# Patient Record
Sex: MALE | Race: WHITE | NOT HISPANIC OR LATINO | Employment: FULL TIME | URBAN - NONMETROPOLITAN AREA
[De-identification: names, ages, dates, MRNs, and addresses within clinical notes are randomized per-mention and may not be internally consistent; named-entity substitution may affect disease eponyms.]

---

## 2017-02-21 ENCOUNTER — HOSPITAL ENCOUNTER (OUTPATIENT)
Dept: LAB | Facility: MEDICAL CENTER | Age: 57
End: 2017-02-21
Attending: FAMILY MEDICINE
Payer: COMMERCIAL

## 2017-02-21 LAB
ALBUMIN SERPL BCP-MCNC: 4.2 G/DL (ref 3.2–4.9)
ALBUMIN/GLOB SERPL: 1.2 G/DL
ALP SERPL-CCNC: 44 U/L (ref 30–99)
ALT SERPL-CCNC: 20 U/L (ref 2–50)
ANION GAP SERPL CALC-SCNC: 5 MMOL/L (ref 0–11.9)
AST SERPL-CCNC: 19 U/L (ref 12–45)
BILIRUB SERPL-MCNC: 0.8 MG/DL (ref 0.1–1.5)
BUN SERPL-MCNC: 18 MG/DL (ref 8–22)
CALCIUM SERPL-MCNC: 9.7 MG/DL (ref 8.5–10.5)
CHLORIDE SERPL-SCNC: 100 MMOL/L (ref 96–112)
CHOLEST SERPL-MCNC: 169 MG/DL (ref 100–199)
CO2 SERPL-SCNC: 29 MMOL/L (ref 20–33)
CREAT SERPL-MCNC: 1.23 MG/DL (ref 0.5–1.4)
ERYTHROCYTE [DISTWIDTH] IN BLOOD BY AUTOMATED COUNT: 44.5 FL (ref 35.9–50)
GLOBULIN SER CALC-MCNC: 3.4 G/DL (ref 1.9–3.5)
GLUCOSE SERPL-MCNC: 102 MG/DL (ref 65–99)
HCT VFR BLD AUTO: 54.3 % (ref 42–52)
HDLC SERPL-MCNC: 41 MG/DL
HGB BLD-MCNC: 17.6 G/DL (ref 14–18)
LDLC SERPL CALC-MCNC: 111 MG/DL
MCH RBC QN AUTO: 27.6 PG (ref 27–33)
MCHC RBC AUTO-ENTMCNC: 32.4 G/DL (ref 33.7–35.3)
MCV RBC AUTO: 85.1 FL (ref 81.4–97.8)
PLATELET # BLD AUTO: 201 K/UL (ref 164–446)
PMV BLD AUTO: 12.2 FL (ref 9–12.9)
POTASSIUM SERPL-SCNC: 3.8 MMOL/L (ref 3.6–5.5)
PROT SERPL-MCNC: 7.6 G/DL (ref 6–8.2)
PSA SERPL DL<=0.01 NG/ML-MCNC: 1.3 NG/ML (ref 0–4)
RBC # BLD AUTO: 6.38 M/UL (ref 4.7–6.1)
SODIUM SERPL-SCNC: 134 MMOL/L (ref 135–145)
TESTOST SERPL-MCNC: 825 NG/DL (ref 175–781)
TRIGL SERPL-MCNC: 87 MG/DL (ref 0–149)
WBC # BLD AUTO: 8.1 K/UL (ref 4.8–10.8)

## 2017-02-21 PROCEDURE — 84153 ASSAY OF PSA TOTAL: CPT

## 2017-02-21 PROCEDURE — 83036 HEMOGLOBIN GLYCOSYLATED A1C: CPT

## 2017-02-21 PROCEDURE — 36415 COLL VENOUS BLD VENIPUNCTURE: CPT

## 2017-02-21 PROCEDURE — 80053 COMPREHEN METABOLIC PANEL: CPT

## 2017-02-21 PROCEDURE — 84403 ASSAY OF TOTAL TESTOSTERONE: CPT

## 2017-02-21 PROCEDURE — 85027 COMPLETE CBC AUTOMATED: CPT

## 2017-02-21 PROCEDURE — 80061 LIPID PANEL: CPT

## 2017-02-22 LAB
EST. AVERAGE GLUCOSE BLD GHB EST-MCNC: 120 MG/DL
HBA1C MFR BLD: 5.8 % (ref 0–5.6)

## 2017-09-18 ENCOUNTER — APPOINTMENT (OUTPATIENT)
Dept: RADIOLOGY | Facility: IMAGING CENTER | Age: 57
End: 2017-09-18
Attending: CHIROPRACTOR
Payer: COMMERCIAL

## 2017-09-18 ENCOUNTER — NON-PROVIDER VISIT (OUTPATIENT)
Dept: URGENT CARE | Facility: PHYSICIAN GROUP | Age: 57
End: 2017-09-18
Payer: COMMERCIAL

## 2017-09-18 DIAGNOSIS — M54.5 LOW BACK PAIN, UNSPECIFIED BACK PAIN LATERALITY, UNSPECIFIED CHRONICITY, WITH SCIATICA PRESENCE UNSPECIFIED: ICD-10-CM

## 2017-09-18 PROCEDURE — 72100 X-RAY EXAM L-S SPINE 2/3 VWS: CPT | Mod: TC | Performed by: PHYSICIAN ASSISTANT

## 2017-09-18 PROCEDURE — 72070 X-RAY EXAM THORAC SPINE 2VWS: CPT | Mod: TC | Performed by: PHYSICIAN ASSISTANT

## 2017-10-04 ENCOUNTER — OFFICE VISIT (OUTPATIENT)
Dept: URGENT CARE | Facility: PHYSICIAN GROUP | Age: 57
End: 2017-10-04
Payer: COMMERCIAL

## 2017-10-04 VITALS
RESPIRATION RATE: 18 BRPM | HEART RATE: 84 BPM | HEIGHT: 76 IN | OXYGEN SATURATION: 95 % | BODY MASS INDEX: 34.46 KG/M2 | TEMPERATURE: 97.2 F | SYSTOLIC BLOOD PRESSURE: 122 MMHG | WEIGHT: 283 LBS | DIASTOLIC BLOOD PRESSURE: 84 MMHG

## 2017-10-04 DIAGNOSIS — E66.9 OBESITY (BMI 35.0-39.9 WITHOUT COMORBIDITY): ICD-10-CM

## 2017-10-04 PROCEDURE — 99203 OFFICE O/P NEW LOW 30 MIN: CPT | Performed by: FAMILY MEDICINE

## 2017-10-04 RX ORDER — LISINOPRIL AND HYDROCHLOROTHIAZIDE 20; 12.5 MG/1; MG/1
1 TABLET ORAL DAILY
Status: ON HOLD | COMMUNITY
Start: 2017-07-09 | End: 2018-11-12

## 2017-10-04 RX ORDER — TESTOSTERONE CYPIONATE 200 MG/ML
200 INJECTION, SOLUTION INTRAMUSCULAR
COMMUNITY
Start: 2017-08-11 | End: 2018-12-26

## 2017-10-04 ASSESSMENT — ENCOUNTER SYMPTOMS
MYALGIAS: 0
NAUSEA: 0
WHEEZING: 0
DIARRHEA: 0
FEVER: 0
VOMITING: 0
ABDOMINAL PAIN: 0
SPUTUM PRODUCTION: 1
CHILLS: 0
SORE THROAT: 1
COUGH: 1

## 2017-10-04 NOTE — PROGRESS NOTES
"Subjective:      Sumeet Buenrostro is a 57 y.o. male who presents with Pharyngitis            Subjective:     Sumeet Buenrostro is a 57 y.o. male who presents for evaluation of a sore throat. Associated symptoms include sinus and nasal congestion, sore throat, nasal blockage, post nasal drip, chills, productive cough, cough described as dry and productive and clear and yellow nasal discharge. Onset of symptoms was 1 days ago, gradually worsening since that time.  He is drinking plenty of fluids.. He has had recent close exposure to someone with proven streptococcal pharyngitis.            Review of Systems   Constitutional: Positive for malaise/fatigue. Negative for chills and fever.   HENT: Positive for congestion and sore throat. Negative for ear discharge.    Respiratory: Positive for cough and sputum production. Negative for wheezing.    Gastrointestinal: Negative for abdominal pain, diarrhea, nausea and vomiting.   Genitourinary: Negative for dysuria.   Musculoskeletal: Negative for myalgias.   Skin: Negative for itching and rash.          PMH:  has no past medical history on file.  MEDS:   Current Outpatient Prescriptions:   •  lisinopril-hydrochlorothiazide (PRINZIDE, ZESTORETIC) 20-12.5 MG per tablet, , Disp: , Rfl:   •  testosterone cypionate (DEPO-TESTOSTERONE) 200 MG/ML Solution injection, , Disp: , Rfl:   •  TESTIM 1 % TD GEL, Apply 5 g to skin as directed every day., Disp: , Rfl:   ALLERGIES: No Known Allergies  SURGHX:   Past Surgical History:   Procedure Laterality Date   • OTHER ORTHOPEDIC SURGERY  '75    norbert knee     SOCHX:  reports that he has never smoked. He has never used smokeless tobacco. He reports that he does not drink alcohol or use drugs.  FH: Family history was reviewed, no pertinent findings to report       Objective:     /84   Pulse 84   Temp 36.2 °C (97.2 °F)   Resp 18   Ht 1.93 m (6' 4\")   Wt (!) 128.4 kg (283 lb)   SpO2 95%   BMI 34.45 kg/m²      Physical Exam "   Constitutional: He is oriented to person, place, and time. He appears well-developed.   HENT:   Head: Normocephalic.   Right Ear: External ear normal.   Left Ear: External ear normal.   Eyes: EOM are normal. Pupils are equal, round, and reactive to light. Right eye exhibits no discharge. Left eye exhibits no discharge.   Cardiovascular: Normal rate and regular rhythm.    Pulmonary/Chest: Effort normal and breath sounds normal.   Abdominal: Soft. Bowel sounds are normal.   Lymphadenopathy:     He has no cervical adenopathy.   Neurological: He is alert and oriented to person, place, and time.   Skin: Skin is warm and dry.               Assessment/Plan:     Laboratory   RAPIDSTREP: negative      Assessment:    Viral pharyngitis.     Plan:    Use of OTC analgesics recommended as well as salt water gargles, Use of decongestant recommended, Pt advised of the risk of peritonsillar abscess formation, Pt advised that he will be infectious for 24hrs after starting ABX    Obesity  - recommended healthy diet with portion control , aerobic exercise 5x week  - recommended nutritionist referral however pt deffered at the time  - obesity counseling done today   - pt stated snoring and non refreshing sleep. Pt was recommended to discuss sleep related breathing disorder with the PCP since MARGIE is an independent risk factor for stroke and heart disease.

## 2017-10-04 NOTE — LETTER
October 4, 2017         Patient: Sumeet Buenrostro   YOB: 1960   Date of Visit: 10/4/2017           To Whom it May Concern:    Sumeet Buenrostro was seen in my clinic on 10/4/2017. He may return to work on 10/6/17..    If you have any questions or concerns, please don't hesitate to call.        Sincerely,           Annmarie Beckett M.D.  Electronically Signed

## 2017-10-04 NOTE — PATIENT INSTRUCTIONS
Pharyngitis  Pharyngitis is redness, pain, and swelling (inflammation) of your pharynx.   CAUSES   Pharyngitis is usually caused by infection. Most of the time, these infections are from viruses (viral) and are part of a cold. However, sometimes pharyngitis is caused by bacteria (bacterial). Pharyngitis can also be caused by allergies. Viral pharyngitis may be spread from person to person by coughing, sneezing, and personal items or utensils (cups, forks, spoons, toothbrushes). Bacterial pharyngitis may be spread from person to person by more intimate contact, such as kissing.   SIGNS AND SYMPTOMS   Symptoms of pharyngitis include:    · Sore throat.    · Tiredness (fatigue).    · Low-grade fever.    · Headache.  · Joint pain and muscle aches.  · Skin rashes.  · Swollen lymph nodes.  · Plaque-like film on throat or tonsils (often seen with bacterial pharyngitis).  DIAGNOSIS   Your health care provider will ask you questions about your illness and your symptoms. Your medical history, along with a physical exam, is often all that is needed to diagnose pharyngitis. Sometimes, a rapid strep test is done. Other lab tests may also be done, depending on the suspected cause.   TREATMENT   Viral pharyngitis will usually get better in 3-4 days without the use of medicine. Bacterial pharyngitis is treated with medicines that kill germs (antibiotics).   HOME CARE INSTRUCTIONS   · Drink enough water and fluids to keep your urine clear or pale yellow.    · Only take over-the-counter or prescription medicines as directed by your health care provider:    ¨ If you are prescribed antibiotics, make sure you finish them even if you start to feel better.    ¨ Do not take aspirin.    · Get lots of rest.    · Gargle with 8 oz of salt water (½ tsp of salt per 1 qt of water) as often as every 1-2 hours to soothe your throat.    · Throat lozenges (if you are not at risk for choking) or sprays may be used to soothe your throat.  SEEK MEDICAL  CARE IF:   · You have large, tender lumps in your neck.  · You have a rash.  · You cough up green, yellow-brown, or bloody spit.  SEEK IMMEDIATE MEDICAL CARE IF:   · Your neck becomes stiff.  · You drool or are unable to swallow liquids.  · You vomit or are unable to keep medicines or liquids down.  · You have severe pain that does not go away with the use of recommended medicines.  · You have trouble breathing (not caused by a stuffy nose).  MAKE SURE YOU:   · Understand these instructions.  · Will watch your condition.  · Will get help right away if you are not doing well or get worse.     This information is not intended to replace advice given to you by your health care provider. Make sure you discuss any questions you have with your health care provider.     Document Released: 12/18/2006 Document Revised: 10/08/2014 Document Reviewed: 08/25/2014  [employer] Interactive Patient Education ©2016 [employer]  Obesity  Obesity is defined as having too much total body fat and a body mass index (BMI) of 30 or more. BMI is an estimate of body fat and is calculated from your height and weight. BMI is typically calculated by your health care provider during regular wellness visits. Obesity happens when you consume more calories than you can burn by exercising or performing daily physical tasks. Prolonged obesity can cause major illnesses or emergencies, such as:  · Stroke.  · Heart disease.  · Diabetes.  · Cancer.  · Arthritis.  · High blood pressure (hypertension).  · High cholesterol.  · Sleep apnea.  · Erectile dysfunction.  · Infertility problems.  CAUSES   · Regularly eating unhealthy foods.  · Physical inactivity.  · Certain disorders, such as an underactive thyroid (hypothyroidism), Cushing's syndrome, and polycystic ovarian syndrome.  · Certain medicines, such as steroids, some depression medicines, and antipsychotics.  · Genetics.  · Lack of sleep.  DIAGNOSIS  A health care provider can diagnose obesity after  calculating your BMI. Obesity will be diagnosed if your BMI is 30 or higher.  There are other methods of measuring obesity levels. Some other methods include measuring your skinfold thickness, your waist circumference, and comparing your hip circumference to your waist circumference.  TREATMENT   A healthy treatment program includes some or all of the following:  · Long-term dietary changes.  · Exercise and physical activity.  · Behavioral and lifestyle changes.  · Medicine only under the supervision of your health care provider. Medicines may help, but only if they are used with diet and exercise programs.  If your BMI is 40 or higher, your health care provider may recommend specialized surgery or programs to help with weight loss.  An unhealthy treatment program includes:  · Fasting.  · Fad diets.  · Supplements and drugs.  These choices do not succeed in long-term weight control.  HOME CARE INSTRUCTIONS  · Exercise and perform physical activity as directed by your health care provider. To increase physical activity, try the following:  ¨ Use stairs instead of elevators.  ¨ Park farther away from store entrances.  ¨ Garden, bike, or walk instead of watching television or using the computer.  · Eat healthy, low-calorie foods and drinks on a regular basis. Eat more fruits and vegetables. Use low-calorie cookbooks or take healthy cooking classes.  · Limit fast food, sweets, and processed snack foods.  · Eat smaller portions.  · Keep a daily journal of everything you eat. There are many free websites to help you with this. It may be helpful to measure your foods so you can determine if you are eating the correct portion sizes.  · Avoid drinking alcohol. Drink more water and drinks without calories.  · Take vitamins and supplements only as recommended by your health care provider.  · Weight-loss support groups, registered dietitians, counselors, and stress reduction education can also be very helpful.  SEEK IMMEDIATE  MEDICAL CARE IF:  · You have chest pain or tightness.  · You have trouble breathing or feel short of breath.  · You have weakness or leg numbness.  · You feel confused or have trouble talking.  · You have sudden changes in your vision.     This information is not intended to replace advice given to you by your health care provider. Make sure you discuss any questions you have with your health care provider.     Document Released: 01/25/2006 Document Revised: 01/08/2016 Document Reviewed: 01/23/2013  [employer] Interactive Patient Education ©2016 [employer]

## 2018-03-16 ENCOUNTER — HOSPITAL ENCOUNTER (OUTPATIENT)
Dept: LAB | Facility: MEDICAL CENTER | Age: 58
End: 2018-03-16
Attending: FAMILY MEDICINE
Payer: COMMERCIAL

## 2018-03-16 LAB
ALBUMIN SERPL BCP-MCNC: 4.6 G/DL (ref 3.2–4.9)
ALBUMIN/GLOB SERPL: 1.4 G/DL
ALP SERPL-CCNC: 45 U/L (ref 30–99)
ALT SERPL-CCNC: 14 U/L (ref 2–50)
ANION GAP SERPL CALC-SCNC: 4 MMOL/L (ref 0–11.9)
AST SERPL-CCNC: 16 U/L (ref 12–45)
BASOPHILS # BLD AUTO: 0.6 % (ref 0–1.8)
BASOPHILS # BLD: 0.04 K/UL (ref 0–0.12)
BILIRUB SERPL-MCNC: 0.7 MG/DL (ref 0.1–1.5)
BUN SERPL-MCNC: 19 MG/DL (ref 8–22)
CALCIUM SERPL-MCNC: 10.3 MG/DL (ref 8.5–10.5)
CHLORIDE SERPL-SCNC: 101 MMOL/L (ref 96–112)
CHOLEST SERPL-MCNC: 181 MG/DL (ref 100–199)
CO2 SERPL-SCNC: 31 MMOL/L (ref 20–33)
CREAT SERPL-MCNC: 1.34 MG/DL (ref 0.5–1.4)
CREAT UR-MCNC: 179 MG/DL
EOSINOPHIL # BLD AUTO: 0.04 K/UL (ref 0–0.51)
EOSINOPHIL NFR BLD: 0.6 % (ref 0–6.9)
ERYTHROCYTE [DISTWIDTH] IN BLOOD BY AUTOMATED COUNT: 43.5 FL (ref 35.9–50)
EST. AVERAGE GLUCOSE BLD GHB EST-MCNC: 137 MG/DL
GLOBULIN SER CALC-MCNC: 3.3 G/DL (ref 1.9–3.5)
GLUCOSE SERPL-MCNC: 92 MG/DL (ref 65–99)
HBA1C MFR BLD: 6.4 % (ref 0–5.6)
HCT VFR BLD AUTO: 55 % (ref 42–52)
HDLC SERPL-MCNC: 44 MG/DL
HGB BLD-MCNC: 18.3 G/DL (ref 14–18)
IMM GRANULOCYTES # BLD AUTO: 0.02 K/UL (ref 0–0.11)
IMM GRANULOCYTES NFR BLD AUTO: 0.3 % (ref 0–0.9)
LDLC SERPL CALC-MCNC: 120 MG/DL
LYMPHOCYTES # BLD AUTO: 1.77 K/UL (ref 1–4.8)
LYMPHOCYTES NFR BLD: 25.3 % (ref 22–41)
MCH RBC QN AUTO: 28.4 PG (ref 27–33)
MCHC RBC AUTO-ENTMCNC: 33.3 G/DL (ref 33.7–35.3)
MCV RBC AUTO: 85.4 FL (ref 81.4–97.8)
MICROALBUMIN UR-MCNC: 1.5 MG/DL
MICROALBUMIN/CREAT UR: 8 MG/G (ref 0–30)
MONOCYTES # BLD AUTO: 0.62 K/UL (ref 0–0.85)
MONOCYTES NFR BLD AUTO: 8.9 % (ref 0–13.4)
NEUTROPHILS # BLD AUTO: 4.5 K/UL (ref 1.82–7.42)
NEUTROPHILS NFR BLD: 64.3 % (ref 44–72)
NRBC # BLD AUTO: 0 K/UL
NRBC BLD-RTO: 0 /100 WBC
PLATELET # BLD AUTO: 211 K/UL (ref 164–446)
PMV BLD AUTO: 12.4 FL (ref 9–12.9)
POTASSIUM SERPL-SCNC: 4.5 MMOL/L (ref 3.6–5.5)
PROT SERPL-MCNC: 7.9 G/DL (ref 6–8.2)
PSA SERPL-MCNC: 1.68 NG/ML (ref 0–4)
RBC # BLD AUTO: 6.44 M/UL (ref 4.7–6.1)
SODIUM SERPL-SCNC: 136 MMOL/L (ref 135–145)
TESTOST SERPL-MCNC: 267 NG/DL (ref 175–781)
TRIGL SERPL-MCNC: 87 MG/DL (ref 0–149)
WBC # BLD AUTO: 7 K/UL (ref 4.8–10.8)

## 2018-03-16 PROCEDURE — 82570 ASSAY OF URINE CREATININE: CPT

## 2018-03-16 PROCEDURE — 83036 HEMOGLOBIN GLYCOSYLATED A1C: CPT

## 2018-03-16 PROCEDURE — 84403 ASSAY OF TOTAL TESTOSTERONE: CPT

## 2018-03-16 PROCEDURE — 36415 COLL VENOUS BLD VENIPUNCTURE: CPT

## 2018-03-16 PROCEDURE — 82043 UR ALBUMIN QUANTITATIVE: CPT

## 2018-03-16 PROCEDURE — 80053 COMPREHEN METABOLIC PANEL: CPT

## 2018-03-16 PROCEDURE — 85025 COMPLETE CBC W/AUTO DIFF WBC: CPT

## 2018-03-16 PROCEDURE — 80061 LIPID PANEL: CPT

## 2018-03-16 PROCEDURE — 84153 ASSAY OF PSA TOTAL: CPT

## 2018-10-12 DIAGNOSIS — Z01.812 PRE-OPERATIVE LABORATORY EXAMINATION: ICD-10-CM

## 2018-10-12 DIAGNOSIS — Z01.810 PRE-OPERATIVE CARDIOVASCULAR EXAMINATION: ICD-10-CM

## 2018-10-12 LAB
ANION GAP SERPL CALC-SCNC: 6 MMOL/L (ref 0–11.9)
APTT PPP: 28.2 SEC (ref 24.7–36)
BUN SERPL-MCNC: 23 MG/DL (ref 8–22)
CALCIUM SERPL-MCNC: 9.3 MG/DL (ref 8.5–10.5)
CHLORIDE SERPL-SCNC: 99 MMOL/L (ref 96–112)
CO2 SERPL-SCNC: 31 MMOL/L (ref 20–33)
CREAT SERPL-MCNC: 1.3 MG/DL (ref 0.5–1.4)
ERYTHROCYTE [DISTWIDTH] IN BLOOD BY AUTOMATED COUNT: 45.9 FL (ref 35.9–50)
EST. AVERAGE GLUCOSE BLD GHB EST-MCNC: 146 MG/DL
GLUCOSE SERPL-MCNC: 95 MG/DL (ref 65–99)
HBA1C MFR BLD: 6.7 % (ref 0–5.6)
HCT VFR BLD AUTO: 54.3 % (ref 42–52)
HGB BLD-MCNC: 17.3 G/DL (ref 14–18)
INR PPP: 1.02 (ref 0.87–1.13)
MCH RBC QN AUTO: 27.3 PG (ref 27–33)
MCHC RBC AUTO-ENTMCNC: 31.9 G/DL (ref 33.7–35.3)
MCV RBC AUTO: 85.6 FL (ref 81.4–97.8)
PLATELET # BLD AUTO: 212 K/UL (ref 164–446)
PMV BLD AUTO: 12.6 FL (ref 9–12.9)
POTASSIUM SERPL-SCNC: 3.9 MMOL/L (ref 3.6–5.5)
PROTHROMBIN TIME: 13.5 SEC (ref 12–14.6)
RBC # BLD AUTO: 6.34 M/UL (ref 4.7–6.1)
SODIUM SERPL-SCNC: 136 MMOL/L (ref 135–145)
WBC # BLD AUTO: 8.4 K/UL (ref 4.8–10.8)

## 2018-10-12 PROCEDURE — 85610 PROTHROMBIN TIME: CPT

## 2018-10-12 PROCEDURE — 36415 COLL VENOUS BLD VENIPUNCTURE: CPT

## 2018-10-12 PROCEDURE — 93005 ELECTROCARDIOGRAM TRACING: CPT

## 2018-10-12 PROCEDURE — 87640 STAPH A DNA AMP PROBE: CPT

## 2018-10-12 PROCEDURE — 83036 HEMOGLOBIN GLYCOSYLATED A1C: CPT

## 2018-10-12 PROCEDURE — 85730 THROMBOPLASTIN TIME PARTIAL: CPT

## 2018-10-12 PROCEDURE — 85027 COMPLETE CBC AUTOMATED: CPT

## 2018-10-12 PROCEDURE — 80048 BASIC METABOLIC PNL TOTAL CA: CPT

## 2018-10-12 PROCEDURE — 93010 ELECTROCARDIOGRAM REPORT: CPT | Performed by: INTERNAL MEDICINE

## 2018-10-12 PROCEDURE — 87641 MR-STAPH DNA AMP PROBE: CPT

## 2018-10-12 RX ORDER — MULTIVIT-MIN/IRON/FOLIC ACID/K 18-600-40
1 CAPSULE ORAL DAILY
COMMUNITY

## 2018-10-12 RX ORDER — ASPIRIN 81 MG/1
81 TABLET, CHEWABLE ORAL DAILY
Status: ON HOLD | COMMUNITY
End: 2018-10-26

## 2018-10-12 NOTE — OR NURSING
"Preparing for your Procedure information\" sheet given to patient with verbal and written instructions. Patient instructed to continue prescribed medications through the day before surgery, instructed to take the following medications the day of surgery per anesthesia protocol  None. Pt instructed to call PMD re: stopped baby ASA. Pt agrees to do so. MB  "

## 2018-10-12 NOTE — OR NURSING
"TOTAL JOINT REPLACEMENT SURGERY   PreAdmit Appointment  Pre-Operative Education Note       1) Did you take a Total Joint Replacement Pre-Operative Education class?  Where?  Answer: [place] [facility]    2) If you did not take a class, did you receive pre-op education in the form of a book or through an online class?    Answer: n/a    3) Have you had the same joint replacement procedure within the last 3 years?   Answer:no    For patients who answered \"No\" to the above questions:    4) Was patient given information on [facility]'s Pre-Op Education class through the Pre-Op Education Class flyer or the Alternative Pre-op Education flyer?   Answer:     5) Was a [facility] Total Joint Replacement Patient Guide binder handed out?   Answer:    6) Did the patient refuse preoperative education?  Answer:  "

## 2018-10-12 NOTE — OR NURSING
DISCHARGE PLANNING NOTE - TOTAL JOINT    Procedure: Procedure(s):  HIP ARTHROPLASTY TOTAL W/INTRAOPERATIVE BLOCK FOR POSTOPERATIVE PAIN RELIEF  Procedure Date: 9/24/2018  Insurance:    Equipment currently available at home? None yet  Steps into the home? 2  Steps within the home? 0  Toilet height? standard  Type of shower? tub  Who will be with you during your recovery? Wife   Is Outpatient Physical Therapy set up after surgery? Yes per  Office   Did you take the Total Joint Class and [facility] [place] [facility]    Plan: Pt to go to [facility] to find toilet riser and tub stool.

## 2018-10-13 LAB
EKG IMPRESSION: NORMAL
SCCMEC + MECA PNL NOSE NAA+PROBE: NEGATIVE
SCCMEC + MECA PNL NOSE NAA+PROBE: POSITIVE

## 2018-10-25 ENCOUNTER — HOSPITAL ENCOUNTER (INPATIENT)
Facility: MEDICAL CENTER | Age: 58
LOS: 1 days | DRG: 470 | End: 2018-10-26
Attending: ORTHOPAEDIC SURGERY | Admitting: ORTHOPAEDIC SURGERY
Payer: COMMERCIAL

## 2018-10-25 ENCOUNTER — APPOINTMENT (OUTPATIENT)
Dept: RADIOLOGY | Facility: MEDICAL CENTER | Age: 58
DRG: 470 | End: 2018-10-25
Attending: PHYSICIAN ASSISTANT
Payer: COMMERCIAL

## 2018-10-25 DIAGNOSIS — M17.11 PRIMARY OSTEOARTHRITIS OF RIGHT KNEE: ICD-10-CM

## 2018-10-25 PROCEDURE — A9270 NON-COVERED ITEM OR SERVICE: HCPCS | Performed by: PHYSICIAN ASSISTANT

## 2018-10-25 PROCEDURE — 3E0T3BZ INTRODUCTION OF ANESTHETIC AGENT INTO PERIPHERAL NERVES AND PLEXI, PERCUTANEOUS APPROACH: ICD-10-PCS | Performed by: ANESTHESIOLOGY

## 2018-10-25 PROCEDURE — 3E0U3BZ INTRODUCTION OF ANESTHETIC AGENT INTO JOINTS, PERCUTANEOUS APPROACH: ICD-10-PCS | Performed by: ORTHOPAEDIC SURGERY

## 2018-10-25 PROCEDURE — L8699 PROSTHETIC IMPLANT NOS: HCPCS | Performed by: ORTHOPAEDIC SURGERY

## 2018-10-25 PROCEDURE — 502579 HCHG PACK, TOTAL KNEE: Performed by: ORTHOPAEDIC SURGERY

## 2018-10-25 PROCEDURE — 700112 HCHG RX REV CODE 229: Performed by: PHYSICIAN ASSISTANT

## 2018-10-25 PROCEDURE — 160041 HCHG SURGERY MINUTES - EA ADDL 1 MIN LEVEL 4: Performed by: ORTHOPAEDIC SURGERY

## 2018-10-25 PROCEDURE — 500865 HCHG NEEDLE, SPINAL 20G/22G: Performed by: ORTHOPAEDIC SURGERY

## 2018-10-25 PROCEDURE — G8980 MOBILITY D/C STATUS: HCPCS | Mod: CJ

## 2018-10-25 PROCEDURE — 97162 PT EVAL MOD COMPLEX 30 MIN: CPT

## 2018-10-25 PROCEDURE — 160035 HCHG PACU - 1ST 60 MINS PHASE I: Performed by: ORTHOPAEDIC SURGERY

## 2018-10-25 PROCEDURE — 160048 HCHG OR STATISTICAL LEVEL 1-5: Performed by: ORTHOPAEDIC SURGERY

## 2018-10-25 PROCEDURE — 0SRC069 REPLACEMENT OF RIGHT KNEE JOINT WITH OXIDIZED ZIRCONIUM ON POLYETHYLENE SYNTHETIC SUBSTITUTE, CEMENTED, OPEN APPROACH: ICD-10-PCS | Performed by: ORTHOPAEDIC SURGERY

## 2018-10-25 PROCEDURE — 160002 HCHG RECOVERY MINUTES (STAT): Performed by: ORTHOPAEDIC SURGERY

## 2018-10-25 PROCEDURE — 73560 X-RAY EXAM OF KNEE 1 OR 2: CPT | Mod: RT

## 2018-10-25 PROCEDURE — 700111 HCHG RX REV CODE 636 W/ 250 OVERRIDE (IP): Performed by: PHYSICIAN ASSISTANT

## 2018-10-25 PROCEDURE — 160029 HCHG SURGERY MINUTES - 1ST 30 MINS LEVEL 4: Performed by: ORTHOPAEDIC SURGERY

## 2018-10-25 PROCEDURE — G8989 SELF CARE D/C STATUS: HCPCS | Mod: CJ

## 2018-10-25 PROCEDURE — G8979 MOBILITY GOAL STATUS: HCPCS | Mod: CJ

## 2018-10-25 PROCEDURE — 501838 HCHG SUTURE GENERAL: Performed by: ORTHOPAEDIC SURGERY

## 2018-10-25 PROCEDURE — G8988 SELF CARE GOAL STATUS: HCPCS | Mod: CJ

## 2018-10-25 PROCEDURE — 3E0U33Z INTRODUCTION OF ANTI-INFLAMMATORY INTO JOINTS, PERCUTANEOUS APPROACH: ICD-10-PCS | Performed by: ORTHOPAEDIC SURGERY

## 2018-10-25 PROCEDURE — G8978 MOBILITY CURRENT STATUS: HCPCS | Mod: CJ

## 2018-10-25 PROCEDURE — 502000 HCHG MISC OR IMPLANTS RC 0278: Performed by: ORTHOPAEDIC SURGERY

## 2018-10-25 PROCEDURE — 700102 HCHG RX REV CODE 250 W/ 637 OVERRIDE(OP): Performed by: PHYSICIAN ASSISTANT

## 2018-10-25 PROCEDURE — G8987 SELF CARE CURRENT STATUS: HCPCS | Mod: CJ

## 2018-10-25 PROCEDURE — 700105 HCHG RX REV CODE 258: Performed by: PHYSICIAN ASSISTANT

## 2018-10-25 PROCEDURE — 700111 HCHG RX REV CODE 636 W/ 250 OVERRIDE (IP)

## 2018-10-25 PROCEDURE — 94760 N-INVAS EAR/PLS OXIMETRY 1: CPT

## 2018-10-25 PROCEDURE — 160022 HCHG BLOCK: Performed by: ORTHOPAEDIC SURGERY

## 2018-10-25 PROCEDURE — 97165 OT EVAL LOW COMPLEX 30 MIN: CPT

## 2018-10-25 PROCEDURE — 160009 HCHG ANES TIME/MIN: Performed by: ORTHOPAEDIC SURGERY

## 2018-10-25 PROCEDURE — 700101 HCHG RX REV CODE 250

## 2018-10-25 PROCEDURE — 770001 HCHG ROOM/CARE - MED/SURG/GYN PRIV*

## 2018-10-25 DEVICE — BONE CEMENT SIMPLEX ANTIBIO - (10/PK): Type: IMPLANTABLE DEVICE | Site: KNEE | Status: FUNCTIONAL

## 2018-10-25 DEVICE — IMPLANTABLE DEVICE: Type: IMPLANTABLE DEVICE | Site: KNEE | Status: FUNCTIONAL

## 2018-10-25 DEVICE — IMPLANT LGN PS HIGH FLEX XLPE SZ 5-6 9MM (1EA): Type: IMPLANTABLE DEVICE | Site: KNEE | Status: FUNCTIONAL

## 2018-10-25 DEVICE — IMPLANT LEGION PS OXIN FEM SZ7 RT: Type: IMPLANTABLE DEVICE | Site: KNEE | Status: FUNCTIONAL

## 2018-10-25 DEVICE — IMPLANT GNS II CMT TIB SIZE 6 RIGHT: Type: IMPLANTABLE DEVICE | Site: KNEE | Status: FUNCTIONAL

## 2018-10-25 RX ORDER — OXYCODONE HYDROCHLORIDE 5 MG/1
5 TABLET ORAL
Status: DISCONTINUED | OUTPATIENT
Start: 2018-10-25 | End: 2018-10-26 | Stop reason: HOSPADM

## 2018-10-25 RX ORDER — OXYCODONE HYDROCHLORIDE 10 MG/1
10 TABLET ORAL
Status: DISCONTINUED | OUTPATIENT
Start: 2018-10-25 | End: 2018-10-26 | Stop reason: HOSPADM

## 2018-10-25 RX ORDER — ACETAMINOPHEN 500 MG
1000 TABLET ORAL EVERY 6 HOURS
Status: DISCONTINUED | OUTPATIENT
Start: 2018-10-25 | End: 2018-10-26 | Stop reason: HOSPADM

## 2018-10-25 RX ORDER — DIPHENHYDRAMINE HYDROCHLORIDE 50 MG/ML
25 INJECTION INTRAMUSCULAR; INTRAVENOUS EVERY 6 HOURS PRN
Status: DISCONTINUED | OUTPATIENT
Start: 2018-10-25 | End: 2018-10-26 | Stop reason: HOSPADM

## 2018-10-25 RX ORDER — SODIUM CHLORIDE, SODIUM LACTATE, POTASSIUM CHLORIDE, CALCIUM CHLORIDE 600; 310; 30; 20 MG/100ML; MG/100ML; MG/100ML; MG/100ML
1000 INJECTION, SOLUTION INTRAVENOUS
Status: COMPLETED | OUTPATIENT
Start: 2018-10-25 | End: 2018-10-25

## 2018-10-25 RX ORDER — BUPIVACAINE HYDROCHLORIDE 5 MG/ML
INJECTION, SOLUTION EPIDURAL; INTRACAUDAL
Status: DISCONTINUED | OUTPATIENT
Start: 2018-10-25 | End: 2018-10-25 | Stop reason: HOSPADM

## 2018-10-25 RX ORDER — OXYCODONE HYDROCHLORIDE 5 MG/1
5 TABLET ORAL
Status: DISCONTINUED | OUTPATIENT
Start: 2018-10-25 | End: 2018-10-25 | Stop reason: HOSPADM

## 2018-10-25 RX ORDER — BISACODYL 10 MG
10 SUPPOSITORY, RECTAL RECTAL
Status: DISCONTINUED | OUTPATIENT
Start: 2018-10-25 | End: 2018-10-26 | Stop reason: HOSPADM

## 2018-10-25 RX ORDER — AMOXICILLIN 250 MG
1 CAPSULE ORAL
Status: DISCONTINUED | OUTPATIENT
Start: 2018-10-25 | End: 2018-10-26 | Stop reason: HOSPADM

## 2018-10-25 RX ORDER — LABETALOL HYDROCHLORIDE 5 MG/ML
5 INJECTION, SOLUTION INTRAVENOUS
Status: DISCONTINUED | OUTPATIENT
Start: 2018-10-25 | End: 2018-10-25 | Stop reason: HOSPADM

## 2018-10-25 RX ORDER — POLYETHYLENE GLYCOL 3350 17 G/17G
1 POWDER, FOR SOLUTION ORAL 2 TIMES DAILY PRN
Status: DISCONTINUED | OUTPATIENT
Start: 2018-10-25 | End: 2018-10-26 | Stop reason: HOSPADM

## 2018-10-25 RX ORDER — SODIUM CHLORIDE, SODIUM LACTATE, POTASSIUM CHLORIDE, CALCIUM CHLORIDE 600; 310; 30; 20 MG/100ML; MG/100ML; MG/100ML; MG/100ML
INJECTION, SOLUTION INTRAVENOUS CONTINUOUS
Status: DISCONTINUED | OUTPATIENT
Start: 2018-10-25 | End: 2018-10-25

## 2018-10-25 RX ORDER — IPRATROPIUM BROMIDE AND ALBUTEROL SULFATE 2.5; .5 MG/3ML; MG/3ML
3 SOLUTION RESPIRATORY (INHALATION)
Status: DISCONTINUED | OUTPATIENT
Start: 2018-10-25 | End: 2018-10-25 | Stop reason: HOSPADM

## 2018-10-25 RX ORDER — AMOXICILLIN 250 MG
1 CAPSULE ORAL NIGHTLY
Status: DISCONTINUED | OUTPATIENT
Start: 2018-10-25 | End: 2018-10-26 | Stop reason: HOSPADM

## 2018-10-25 RX ORDER — ROPIVACAINE HYDROCHLORIDE 5 MG/ML
INJECTION, SOLUTION EPIDURAL; INFILTRATION; PERINEURAL
Status: DISCONTINUED | OUTPATIENT
Start: 2018-10-25 | End: 2018-10-25 | Stop reason: HOSPADM

## 2018-10-25 RX ORDER — METHYLPREDNISOLONE ACETATE 40 MG/ML
INJECTION, SUSPENSION INTRA-ARTICULAR; INTRALESIONAL; INTRAMUSCULAR; SOFT TISSUE
Status: DISCONTINUED | OUTPATIENT
Start: 2018-10-25 | End: 2018-10-25 | Stop reason: HOSPADM

## 2018-10-25 RX ORDER — ONDANSETRON 2 MG/ML
4 INJECTION INTRAMUSCULAR; INTRAVENOUS
Status: DISCONTINUED | OUTPATIENT
Start: 2018-10-25 | End: 2018-10-25 | Stop reason: HOSPADM

## 2018-10-25 RX ORDER — SODIUM CHLORIDE, SODIUM LACTATE, POTASSIUM CHLORIDE, CALCIUM CHLORIDE 600; 310; 30; 20 MG/100ML; MG/100ML; MG/100ML; MG/100ML
INJECTION, SOLUTION INTRAVENOUS CONTINUOUS
Status: ACTIVE | OUTPATIENT
Start: 2018-10-25 | End: 2018-10-25

## 2018-10-25 RX ORDER — DOCUSATE SODIUM 100 MG/1
100 CAPSULE, LIQUID FILLED ORAL 2 TIMES DAILY
Status: DISCONTINUED | OUTPATIENT
Start: 2018-10-25 | End: 2018-10-26 | Stop reason: HOSPADM

## 2018-10-25 RX ORDER — ONDANSETRON 2 MG/ML
4 INJECTION INTRAMUSCULAR; INTRAVENOUS EVERY 4 HOURS PRN
Status: DISCONTINUED | OUTPATIENT
Start: 2018-10-25 | End: 2018-10-26 | Stop reason: HOSPADM

## 2018-10-25 RX ORDER — EPINEPHRINE 1 MG/ML
INJECTION INTRAMUSCULAR; INTRAVENOUS; SUBCUTANEOUS
Status: DISCONTINUED | OUTPATIENT
Start: 2018-10-25 | End: 2018-10-25 | Stop reason: HOSPADM

## 2018-10-25 RX ORDER — SCOLOPAMINE TRANSDERMAL SYSTEM 1 MG/1
1 PATCH, EXTENDED RELEASE TRANSDERMAL
Status: DISCONTINUED | OUTPATIENT
Start: 2018-10-25 | End: 2018-10-26 | Stop reason: HOSPADM

## 2018-10-25 RX ORDER — HYDRALAZINE HYDROCHLORIDE 20 MG/ML
5 INJECTION INTRAMUSCULAR; INTRAVENOUS
Status: DISCONTINUED | OUTPATIENT
Start: 2018-10-25 | End: 2018-10-25 | Stop reason: HOSPADM

## 2018-10-25 RX ORDER — MIDAZOLAM HYDROCHLORIDE 1 MG/ML
1 INJECTION INTRAMUSCULAR; INTRAVENOUS
Status: DISCONTINUED | OUTPATIENT
Start: 2018-10-25 | End: 2018-10-25 | Stop reason: HOSPADM

## 2018-10-25 RX ORDER — MEPERIDINE HYDROCHLORIDE 25 MG/ML
12.5 INJECTION INTRAMUSCULAR; INTRAVENOUS; SUBCUTANEOUS
Status: DISCONTINUED | OUTPATIENT
Start: 2018-10-25 | End: 2018-10-25 | Stop reason: HOSPADM

## 2018-10-25 RX ORDER — HYDROMORPHONE HYDROCHLORIDE 2 MG/ML
0.5 INJECTION, SOLUTION INTRAMUSCULAR; INTRAVENOUS; SUBCUTANEOUS
Status: DISCONTINUED | OUTPATIENT
Start: 2018-10-25 | End: 2018-10-26 | Stop reason: HOSPADM

## 2018-10-25 RX ORDER — OXYCODONE HYDROCHLORIDE 10 MG/1
10 TABLET ORAL
Status: DISCONTINUED | OUTPATIENT
Start: 2018-10-25 | End: 2018-10-25 | Stop reason: HOSPADM

## 2018-10-25 RX ORDER — DIPHENHYDRAMINE HYDROCHLORIDE 50 MG/ML
12.5 INJECTION INTRAMUSCULAR; INTRAVENOUS
Status: DISCONTINUED | OUTPATIENT
Start: 2018-10-25 | End: 2018-10-25 | Stop reason: HOSPADM

## 2018-10-25 RX ORDER — ENEMA 19; 7 G/133ML; G/133ML
1 ENEMA RECTAL
Status: DISCONTINUED | OUTPATIENT
Start: 2018-10-25 | End: 2018-10-26 | Stop reason: HOSPADM

## 2018-10-25 RX ORDER — HALOPERIDOL 5 MG/ML
1 INJECTION INTRAMUSCULAR
Status: DISCONTINUED | OUTPATIENT
Start: 2018-10-25 | End: 2018-10-25 | Stop reason: HOSPADM

## 2018-10-25 RX ORDER — DEXAMETHASONE SODIUM PHOSPHATE 4 MG/ML
4 INJECTION, SOLUTION INTRA-ARTICULAR; INTRALESIONAL; INTRAMUSCULAR; INTRAVENOUS; SOFT TISSUE
Status: DISCONTINUED | OUTPATIENT
Start: 2018-10-25 | End: 2018-10-26 | Stop reason: HOSPADM

## 2018-10-25 RX ORDER — HALOPERIDOL 5 MG/ML
1 INJECTION INTRAMUSCULAR EVERY 6 HOURS PRN
Status: DISCONTINUED | OUTPATIENT
Start: 2018-10-25 | End: 2018-10-26 | Stop reason: HOSPADM

## 2018-10-25 RX ADMIN — SODIUM CHLORIDE, POTASSIUM CHLORIDE, SODIUM LACTATE AND CALCIUM CHLORIDE: 600; 310; 30; 20 INJECTION, SOLUTION INTRAVENOUS at 12:32

## 2018-10-25 RX ADMIN — SODIUM CHLORIDE, SODIUM LACTATE, POTASSIUM CHLORIDE, CALCIUM CHLORIDE 1000 ML: 600; 310; 30; 20 INJECTION, SOLUTION INTRAVENOUS at 05:51

## 2018-10-25 RX ADMIN — DOCUSATE SODIUM 100 MG: 100 CAPSULE, LIQUID FILLED ORAL at 12:32

## 2018-10-25 RX ADMIN — SENNOSIDES AND DOCUSATE SODIUM 1 TABLET: 8.6; 5 TABLET ORAL at 20:16

## 2018-10-25 RX ADMIN — SODIUM CHLORIDE 2 G: 9 INJECTION, SOLUTION INTRAVENOUS at 15:07

## 2018-10-25 RX ADMIN — ACETAMINOPHEN 1000 MG: 500 TABLET, FILM COATED ORAL at 17:25

## 2018-10-25 RX ADMIN — ACETAMINOPHEN 1000 MG: 500 TABLET, FILM COATED ORAL at 12:32

## 2018-10-25 RX ADMIN — DOCUSATE SODIUM 100 MG: 100 CAPSULE, LIQUID FILLED ORAL at 17:25

## 2018-10-25 ASSESSMENT — PATIENT HEALTH QUESTIONNAIRE - PHQ9
SUM OF ALL RESPONSES TO PHQ9 QUESTIONS 1 AND 2: 0
2. FEELING DOWN, DEPRESSED, IRRITABLE, OR HOPELESS: NOT AT ALL
1. LITTLE INTEREST OR PLEASURE IN DOING THINGS: NOT AT ALL

## 2018-10-25 ASSESSMENT — COGNITIVE AND FUNCTIONAL STATUS - GENERAL
PERSONAL GROOMING: A LITTLE
MOBILITY SCORE: 17
SUGGESTED CMS G CODE MODIFIER DAILY ACTIVITY: CK
DRESSING REGULAR LOWER BODY CLOTHING: A LITTLE
TOILETING: A LITTLE
TOILETING: A LITTLE
HELP NEEDED FOR BATHING: A LITTLE
STANDING UP FROM CHAIR USING ARMS: A LITTLE
DAILY ACTIVITIY SCORE: 17
SUGGESTED CMS G CODE MODIFIER DAILY ACTIVITY: CJ
DRESSING REGULAR UPPER BODY CLOTHING: A LITTLE
TURNING FROM BACK TO SIDE WHILE IN FLAT BAD: A LITTLE
HELP NEEDED FOR BATHING: A LITTLE
WALKING IN HOSPITAL ROOM: A LITTLE
EATING MEALS: A LOT
MOVING FROM LYING ON BACK TO SITTING ON SIDE OF FLAT BED: A LITTLE
CLIMB 3 TO 5 STEPS WITH RAILING: A LOT
SUGGESTED CMS G CODE MODIFIER MOBILITY: CK
MOVING TO AND FROM BED TO CHAIR: A LITTLE
DAILY ACTIVITIY SCORE: 22

## 2018-10-25 ASSESSMENT — GAIT ASSESSMENTS
ASSISTIVE DEVICE: FRONT WHEEL WALKER
DEVIATION: STEP TO
GAIT LEVEL OF ASSIST: STAND BY ASSIST
DISTANCE (FEET): 150

## 2018-10-25 ASSESSMENT — COPD QUESTIONNAIRES
DO YOU EVER COUGH UP ANY MUCUS OR PHLEGM?: NO/ONLY WITH OCCASIONAL COLDS OR INFECTIONS
DURING THE PAST 4 WEEKS HOW MUCH DID YOU FEEL SHORT OF BREATH: NONE/LITTLE OF THE TIME
COPD SCREENING SCORE: 2
HAVE YOU SMOKED AT LEAST 100 CIGARETTES IN YOUR ENTIRE LIFE: NO/DON'T KNOW

## 2018-10-25 ASSESSMENT — LIFESTYLE VARIABLES
ALCOHOL_USE: NO
EVER_SMOKED: NEVER

## 2018-10-25 ASSESSMENT — PAIN SCALES - GENERAL
PAINLEVEL_OUTOF10: 0
PAINLEVEL_OUTOF10: 2
PAINLEVEL_OUTOF10: 0
PAINLEVEL_OUTOF10: 2
PAINLEVEL_OUTOF10: 2
PAINLEVEL_OUTOF10: ASSUMED PAIN PRESENT

## 2018-10-25 ASSESSMENT — ACTIVITIES OF DAILY LIVING (ADL): TOILETING: INDEPENDENT

## 2018-10-25 NOTE — PROGRESS NOTES
2 RN skin assessment complete, skin not WDL. See wound flowsheet. Surgical incision R knee, dressing CDI

## 2018-10-25 NOTE — PROGRESS NOTES
Received report from PACU RN, assumed pt care. Pt transferred from PACU to [facility] room 220. Pt A&Ox4, denies pain in leg. Dressing to knee CDI, +CMS, cap refill less than 3 seconds, pt able to move leg w/o difficulty. Polar ice to knee. Pt d/t void post-op by 1545. Pt taught to inform nurse if experiencing pain above comfort goal, SOB, chest pain, ambulating out of bed, or for any further assistance. Fall precautions in place, call light within reach. Will continue with care.

## 2018-10-25 NOTE — THERAPY
"Occupational Therapy Evaluation completed.   Functional Status:  Pt is 57 yo male s/p R TKA. Pt educated on post-surgical dressing technique and adaptive equipment to increase independence with dressing, pt reported wife will be available to assist as necessary. Pt also has safety DME installed in-home (shower chair, elevated toilet). Pt demonstrated functional mobility, UB&LB dressing, sit>stand w/FWW. Pt has no additional questions or concerns regarding safe ADL completion after d/c to home.  Plan of Care: Patient with no further skilled OT needs in the acute care setting at this time  Discharge Recommendations:  Equipment: No Equipment Needed. Post-acute therapy Discharge to home with outpatient f/u per physician's orders.    See \"Rehab Therapy-Acute\" Patient Summary Report for complete documentation.    "

## 2018-10-25 NOTE — THERAPY
"Physical Therapy Evaluation completed.   Bed Mobility:  Supine to Sit: Modified Independent  Transfers: Sit to Stand: Stand by Assist  Gait: Level Of Assist: Stand by Assist with Front-Wheel Walker  X 150 feet     Plan of Care: Patient with no further skilled PT needs in the acute care setting at this time  Discharge Recommendations: Equipment: No Equipment Needed. Post-acute therapy Discharge to home with outpatient or home health for additional skilled therapy services.  58 year old male S/P R TKR Pt lives at home with wife and is normally active,Pt was safe with bed mob,transfers,ambulation and stairs.He understands HEP and has no equipment needs   See \"Rehab Therapy-Acute\" Patient Summary Report for complete documentation.     "

## 2018-10-25 NOTE — OR NURSING
0855: To PACU post right total knee arthroplasty w/ block. Pt is extubated, breathing is spontaneous and unlabored. Strong pulse noted.  0905: Report to LB Stinson RN.

## 2018-10-25 NOTE — OR NURSING
Respirations easy.  Right leg elevated with pillow under heel.  ACE wrap clean,dry,intact under ADILIA,  SCDs also in place.  Palpable pedal pulses, feet are warm with brisk cap refill, toes mobile, nailbeds pink.Post-op X-ray done.  Report to JOSE ROBETRO Aleman.

## 2018-10-26 VITALS
TEMPERATURE: 98.5 F | BODY MASS INDEX: 33.94 KG/M2 | WEIGHT: 273 LBS | SYSTOLIC BLOOD PRESSURE: 115 MMHG | RESPIRATION RATE: 20 BRPM | OXYGEN SATURATION: 95 % | HEART RATE: 65 BPM | HEIGHT: 75 IN | DIASTOLIC BLOOD PRESSURE: 56 MMHG

## 2018-10-26 LAB
HCT VFR BLD AUTO: 43.9 % (ref 42–52)
HGB BLD-MCNC: 14.7 G/DL (ref 14–18)

## 2018-10-26 PROCEDURE — 700102 HCHG RX REV CODE 250 W/ 637 OVERRIDE(OP): Performed by: PHYSICIAN ASSISTANT

## 2018-10-26 PROCEDURE — A9270 NON-COVERED ITEM OR SERVICE: HCPCS | Performed by: PHYSICIAN ASSISTANT

## 2018-10-26 PROCEDURE — 36415 COLL VENOUS BLD VENIPUNCTURE: CPT

## 2018-10-26 PROCEDURE — 700105 HCHG RX REV CODE 258: Performed by: PHYSICIAN ASSISTANT

## 2018-10-26 PROCEDURE — 700111 HCHG RX REV CODE 636 W/ 250 OVERRIDE (IP): Performed by: PHYSICIAN ASSISTANT

## 2018-10-26 PROCEDURE — 85018 HEMOGLOBIN: CPT

## 2018-10-26 PROCEDURE — 85014 HEMATOCRIT: CPT

## 2018-10-26 PROCEDURE — 700112 HCHG RX REV CODE 229: Performed by: PHYSICIAN ASSISTANT

## 2018-10-26 RX ORDER — OXYCODONE HYDROCHLORIDE 5 MG/1
5 TABLET ORAL EVERY 4 HOURS PRN
Qty: 60 TAB | Refills: 0 | Status: SHIPPED | OUTPATIENT
Start: 2018-10-26 | End: 2018-11-05

## 2018-10-26 RX ORDER — ONDANSETRON 4 MG/1
4 TABLET, FILM COATED ORAL EVERY 6 HOURS PRN
Qty: 20 TAB | Refills: 0 | Status: SHIPPED | OUTPATIENT
Start: 2018-10-26 | End: 2018-10-31

## 2018-10-26 RX ORDER — ASPIRIN 325 MG
325 TABLET, DELAYED RELEASE (ENTERIC COATED) ORAL 2 TIMES DAILY
Qty: 60 TAB | Refills: 0 | Status: ON HOLD | OUTPATIENT
Start: 2018-10-26 | End: 2018-11-02

## 2018-10-26 RX ORDER — PSEUDOEPHEDRINE HCL 30 MG
100 TABLET ORAL 2 TIMES DAILY
Qty: 60 CAP | Refills: 0 | Status: ON HOLD | OUTPATIENT
Start: 2018-10-26 | End: 2018-11-10

## 2018-10-26 RX ADMIN — DOCUSATE SODIUM 100 MG: 100 CAPSULE, LIQUID FILLED ORAL at 06:06

## 2018-10-26 RX ADMIN — ASPIRIN 325 MG: 325 TABLET, DELAYED RELEASE ORAL at 09:20

## 2018-10-26 RX ADMIN — ACETAMINOPHEN 1000 MG: 500 TABLET, FILM COATED ORAL at 06:06

## 2018-10-26 RX ADMIN — SODIUM CHLORIDE 2 G: 9 INJECTION, SOLUTION INTRAVENOUS at 00:36

## 2018-10-26 RX ADMIN — ACETAMINOPHEN 1000 MG: 500 TABLET, FILM COATED ORAL at 00:35

## 2018-10-26 ASSESSMENT — PAIN SCALES - GENERAL
PAINLEVEL_OUTOF10: 0

## 2018-10-26 NOTE — CARE PLAN
Problem: Safety  Goal: Will remain free from injury  Outcome: PROGRESSING AS EXPECTED  Call light and personal belongings within reach, bed in low locked position, treaded slipper socks in place, room free of clutter, bed alarm activated. Pt asked to call for assistance prior to getting up and verbalizes/demonstrates understanding of all fall precautions. Hourly rounding in place to ensure pt safety and needs are met.         Problem: Venous Thromboembolism (VTW)/Deep Vein Thrombosis (DVT) Prevention:  Goal: Patient will participate in Venous Thrombosis (VTE)/Deep Vein Thrombosis (DVT)Prevention Measures  Outcome: PROGRESSING AS EXPECTED  SCDs and ADILIA hose in place. Pt has been ambulatory in hallway and has been up to bathroom and back multiple times in the night.    Problem: Pain Management  Goal: Pain level will decrease to patient's comfort goal  Outcome: PROGRESSING AS EXPECTED  Polar ice in place and pt medicated with Toradol and Tylenol. Pt denies any pain and does not want to take any narcotics. Educated pt that the block may wear off quickly and to notify staff immediately if he starts to experience worsening pain.

## 2018-10-26 NOTE — PROGRESS NOTES
"Subjective  Patient is now postoperative day #1 status post right TKA.  Patient did well overnight - no acute events or issues.  Pain is currently well controlled.  Patient denies any current or recent subjective numbness, tingling, weakness, fevers, chills, nausea, vomiting, chest pain, or shortness of breath.        Physical Exam  General: Patient is resting comfortably in bed.  No acute distress.    Lower Extremity: Surgical dressing is clean, dry, and intact.  Surgical incision is clean, dry, and intact with sutures in place and no evidence of erythema, drainage, or bleeding.  Patient clearly fires tibialis anterior, EHL, and gastrocnemius/soleus.  Sensation is intact to light touch throughout superficial peroneal, deep peroneal, and tibial nerve distributions.  Strong and palpable 2+ dorsalis pedis and posterior tibial pulses with capillary refill less than 2 seconds.  No lower leg tenderness or discomfort.    Blood pressure 115/56, pulse 65, temperature 36.9 °C (98.5 °F), resp. rate 20, height 1.905 m (6' 3\"), weight 123.8 kg (273 lb), SpO2 95 %.    Recent Labs      10/26/18   0438   HEMOGLOBIN  14.7   HEMATOCRIT  43.9                   Intake/Output Summary (Last 24 hours) at 10/26/18 0836  Last data filed at 10/26/18 0700   Gross per 24 hour   Intake             3860 ml   Output             2775 ml   Net             1085 ml       Assessment  - Postoperative day #one status post right TKA - doing well    Plan  Antibiotics: none  Hemoglobin: 14.7  Cultures: None pending  DVT Prophylaxis: DVT: Mechanical (SCDs, compressive stockings) and pharmacologic (ASA 325mg BID)  Diet: Advance as tolerated  Activity/PT: WBAT  Drains: none  Disposition: home.     "

## 2018-10-26 NOTE — DISCHARGE INSTRUCTIONS
Discharge instructions per pre-printed handout.   Weight bearing as tolerated.   Ok to shower post op day 3, no tub soaks.   Take aspirin 325 mg twice daily for DVT prophylaxis  Follow up with Dr. Christopher at already scheduled appointment.  Call [phone] with questions.    Discharge Instructions    Discharged to home by car with relative. Discharged via wheelchair, hospital escort: Yes.  Special equipment needed: Not Applicable    Be sure to schedule a follow-up appointment with your primary care doctor or any specialists as instructed.     Discharge Plan:   Influenza Vaccine Indication: Patient Refuses    I understand that a diet low in cholesterol, fat, and sodium is recommended for good health. Unless I have been given specific instructions below for another diet, I accept this instruction as my diet prescription.   Other diet: Regular    Special Instructions: Discharge instructions for the Orthopedic Patient    Follow up with Primary Care Physician within 2 weeks of discharge to home, regarding:  Review of medications and diagnostic testing.  Surveillance for medical complications.  Workup and treatment of osteoporosis, if appropriate.     -Is this a Joint Replacement patient? Yes Total Joint Knee Replacement Discharge Instructions    Pain  - The goal is to slowly wean off the prescription pain medicine.  - Ice can be used for pain control.  20 minutes at a time is recommended, and never directly against your skin or incision.  - Most patients are off the pain pills by 3 weeks; others may require a low level of pain medications for many months.  If your pain continues to be severe, follow up with your physician.  Infection    Knee joint infections; occur in fewer than 2% of patients. The most common causes of infection following total knee replacement surgery are from bacteria that enter the bloodstream during dental procedures, urinary tract infections, or skin infections. These bacteria can lodge around  your knee replacement and cause an infection.  - Keep the incision as clean and dry as possible.  - Always wash your hands before touching your incision.  - Skin infections tend to develop around 7-10 days after surgery; most can be treated with oral antibiotics.  - Dental Care should be delayed for 3 months after surgery, your surgeon recommends taking a dose of antibiotics 1 hour prior to any dental procedure. After 2 years, most surgeons recommend antibiotics only before an extensive procedure.  Ask your surgeon what he recommends.  - Signs and symptoms of infection can include:  low grade fever, redness, pain, swelling and drainage from your incision.  Notify your surgeon immediately if you develop any of these symptoms.  Other instructions  - Bowel habits - constipation is extremely common and is caused by a combination of anesthesia, lack of mobility and pain medicine.  Use stool softeners or laxatives if necessary. It is important not to ignore this problem, as bowel obstructions can be a serious complication after joint replacement surgery.  - Mood/Energy Level - Many patients experience a lack of energy and endurance for up to 2-3 months after surgery.  Some may also feel down and can even become depressed.  This is likely due to the postoperative anemia, change in activity level, lack of sleep, pain medicine and just the emotional reaction to the surgery itself that is a big disruption in a person’s life.  This usually passes.  If symptoms persist, follow up with your primary physician.  - Returning to work - Your surgeon will give you more specific instructions. Depending on the type of activities you perform, it may be 6 to 8 weeks before you return to work.   Generally, if you work a sedentary job requiring little standing or walking, most patients may return within 2-6 weeks.  Manual labor jobs involving walking, lifting and standing may take longer. Your surgeon’s office can provide a release to  part-time or light duty work early on in your recovery and progress you to full duty as able.    - Driving - If your left knee was replaced and you have an automatic transmission, you may be able to begin driving in a week or so, provided you are no longer taking narcotic pain medication. If your right knee was replaced, avoid driving for 6 to 8 weeks. Remember that your reflexes may not be as sharp as before your surgery. Ask your surgeon for specific instructions.   - Avoiding falls - A fall during the first few weeks after surgery can damage your new knee and may result in a need for further surgery.   throw rugs and tack down loose carpeting.  Be aware of floor hazards such as pets, small objects or uneven surfaces.    - [employer] Metal Detectors - The sensitivity of metal detectors varies and it is likely that your prosthesis will cause an alarm.  Inform the  of your artificial joint.  Diet  - Resume your normal diet as tolerated.  - It is important to achieve a healthy nutritional status by eating a well balanced diet on a regular basis.  - Your physician may recommend that you take iron and vitamin supplements.   - Continue to drink plenty of fluids.  Shower/Bathing  - You may shower as soon as you get home from the hospital unless otherwise instructed.  - Keep your incision out of water.  To keep the incision dry when showering, cover it with a plastic bag or plastic wrap.  - Pat incision dry if it gets wet.  Don’t rub.  - Do not submerge in a bath until staples are out and the incision is completely healed. (Approximately 6-8 weeks)  Dressing Change:  Procedure (if recommended by your physician)  - Wash hands.  - Open all new dressing change materials.  - Remove old dressing and discard.  - Inspect incision for redness, increase in clear drainage, yellow/green drainage, odor and surrounding skin hot to touch.  -  ABD (large gauze) pad or “island dressing” by one corner and lay  over the incision.  Be careful not to touch the inside of the dressing that will lay over the incision.  - Secure in place as instructed (Ace wrap or tape).    Swelling/Bruising    - Swelling can last from 3-6 months.  - Elevate your leg higher than your heart while reclining.   The first week you are home you should elevate your leg an equal amount of time, as you are active.    - Anti-inflammatory pills can be taken once you have stopped the blood thinners.  - The swelling is usually worse after you go home since you are upright for longer periods of time.  - Bruising is common and can involve the entire leg including the thigh, calf and even foot. Bruising often does not appear until after you arrive home and it can be quite dramatic- purple, black, and green.  The bruising you can see is not usually concerning and will subside without any treatment.      Blood Clot Prevention  Blood clots in the legs and the less common, but frightening, clots that travel to the lungs are a real focus of our preventative. Most patients are at standard risk for them, but those patients who are at higher risk include people who have had previous clots, a family history of clotting, smoking, diabetes, obesity, advanced age, use of estrogen and a sedentary lifestyle.    - Signs of blood clots in legs - Swelling in thigh, calf or ankle that does not go down with elevation.  Pain, heat and tenderness in calf, back of calf or groin area.  NOTE: blood clots can occur in either leg.  - You have been receiving anticoagulant therapy (blood thinners) in the hospital and you may be instructed to continue at home depending on your risk factors.  - Your risk for developing a clot continues for up to 2-3 months after surgery.  You should avoid prolonged sitting and dehydration during that time (long air trips and car trips).  If you do take a trip during this time, please get up and move around every 1- 1.5 hours.  - If you are prescribed  blood-thinning medication for home, follow instructions as directed. (Handouts provided if applicable).      Activity  Once home, you should continue to stay active. The key is to remember not to overdo it! While you can expect some good days and some bad days, you should notice a gradual improvement and a gradual increase in your endurance over the next 6 to 12 months. Exercise is a critical component of home care, particularly during the first few weeks after surgery.     - Normal activities of daily living You should be able to resume most within 3 to 6 weeks following surgery. Some pain with activity and at night is common for several weeks after surgery  -  Physical Therapy Exercises - Continue to do the exercises prescribed for at least two months after surgery. Riding a stationary bicycle can help maintain muscle tone and keep your knee flexible. Try to achieve the maximum degree of bending and extension possible. (handout provided by Therapist).  - Sexual Activity -. Your surgeon can tell you when it safe to resume sexual activity.    - Sleeping Positions - You can safely sleep on your back, on either side, or on your stomach.   - Other Activities - Walk as much as you like, but remember that walking is no substitute for the exercises your doctor and physical therapist will prescribe. Lower impact activities are preferred.  If you have specific questions, consult your Surgeon.    When to Call the Doctor   Call the physician if:   - Fever over 100.5? F  - Increased pain, drainage, redness, odor or heat around the incision area  - Shaking chills  - Increased knee pain with activity and rest  - Increased pain in calf, tenderness or redness above or below the knee  - Increased swelling of calf, ankle, foot  - Sudden increased shortness of breath, sudden onset of chest pain, localized chest pain with coughing  - Incision opening  Or, if there are any questions or concerns about medications or care.       -Is this  patient being discharged with medication to prevent blood clots?  Yes, Aspirin Aspirin, ASA oral tablets  What is this medicine?  ASPIRIN (AS pir in) is a pain reliever. It is used to treat mild pain and fever. This medicine is also used as directed by a doctor to prevent and to treat heart attacks, to prevent strokes, and to treat arthritis or inflammation.  This medicine may be used for other purposes; ask your health care provider or pharmacist if you have questions.  COMMON BRAND NAME(S): Aspir-Low, Aspir-Bertha, Aspirtab, Manuel Advanced Aspirin, Manuel Aspirin, Manuel Aspirin Extra Strength, Manuel Aspirin Plus, Manuel Extra Strength, Manuel Extra Strength Plus, Manuel Genuine Aspirin, Manuel Womens Aspirin, Bufferin, Bufferin Extra Strength, Bufferin Low Dose  What should I tell my health care provider before I take this medicine?  They need to know if you have any of these conditions:  -anemia  -asthma  -bleeding problems  -child with chickenpox, the flu, or other viral infection  -diabetes  -gout  -if you frequently drink alcohol containing drinks  -kidney disease  -liver disease  -low level of vitamin K  -lupus  -smoke tobacco  -stomach ulcers or other problems  -an unusual or allergic reaction to aspirin, tartrazine dye, other medicines, dyes, or preservatives  -pregnant or trying to get pregnant  -breast-feeding  How should I use this medicine?  Take this medicine by mouth with a glass of water. Follow the directions on the package or prescription label. You can take this medicine with or without food. If it upsets your stomach, take it with food. Do not take your medicine more often than directed.  Talk to your pediatrician regarding the use of this medicine in children. While this drug may be prescribed for children as young as 12 years of age for selected conditions, precautions do apply. Children and teenagers should not use this medicine to treat chicken pox or flu symptoms unless directed by a  doctor.  Patients over 65 years old may have a stronger reaction and need a smaller dose.  Overdosage: If you think you have taken too much of this medicine contact a poison control center or emergency room at once.  NOTE: This medicine is only for you. Do not share this medicine with others.  What if I miss a dose?  If you are taking this medicine on a regular schedule and miss a dose, take it as soon as you can. If it is almost time for your next dose, take only that dose. Do not take double or extra doses.  What may interact with this medicine?  Do not take this medicine with any of the following medications:  -cidofovir  -ketorolac  -probenecid  This medicine may also interact with the following medications:  -alcohol  -alendronate  -bismuth subsalicylate  -flavocoxid  -herbal supplements like feverfew, garlic, chantelle, ginkgo biloba, horse chestnut  -medicines for diabetes or glaucoma like acetazolamide, methazolamide  -medicines for gout  -medicines that treat or prevent blood clots like enoxaparin, heparin, ticlopidine, warfarin  -other aspirin and aspirin-like medicines  -NSAIDs, medicines for pain and inflammation, like ibuprofen or naproxen  -pemetrexed  -sulfinpyrazone  -varicella live vaccine  This list may not describe all possible interactions. Give your health care provider a list of all the medicines, herbs, non-prescription drugs, or dietary supplements you use. Also tell them if you smoke, drink alcohol, or use illegal drugs. Some items may interact with your medicine.  What should I watch for while using this medicine?  If you are treating yourself for pain, tell your doctor or health care professional if the pain lasts more than 10 days, if it gets worse, or if there is a new or different kind of pain. Tell your doctor if you see redness or swelling. Also, check with your doctor if you have a fever that lasts for more than 3 days. Only take this medicine to prevent heart attacks or blood clotting  if prescribed by your doctor or health care professional.  Do not take aspirin or aspirin-like medicines with this medicine. Too much aspirin can be dangerous. Always read the labels carefully.  This medicine can irritate your stomach or cause bleeding problems. Do not smoke cigarettes or drink alcohol while taking this medicine. Do not lie down for 30 minutes after taking this medicine to prevent irritation to your throat.  If you are scheduled for any medical or dental procedure, tell your healthcare provider that you are taking this medicine. You may need to stop taking this medicine before the procedure.  This medicine may be used to treat migraines. If you take migraine medicines for 10 or more days a month, your migraines may get worse. Keep a diary of headache days and medicine use. Contact your healthcare professional if your migraine attacks occur more frequently.  What side effects may I notice from receiving this medicine?  Side effects that you should report to your doctor or health care professional as soon as possible:  -allergic reactions like skin rash, itching or hives, swelling of the face, lips, or tongue  -breathing problems  -changes in hearing, ringing in the ears  -confusion  -general ill feeling or flu-like symptoms  -pain on swallowing  -redness, blistering, peeling or loosening of the skin, including inside the mouth or nose  -signs and symptoms of bleeding such as bloody or black, tarry stools; red or dark-brown urine; spitting up blood or brown material that looks like coffee grounds; red spots on the skin; unusual bruising or bleeding from the eye, gums, or nose  -trouble passing urine or change in the amount of urine  -unusually weak or tired  -yellowing of the eyes or skin  Side effects that usually do not require medical attention (report to your doctor or health care professional if they continue or are bothersome):  -diarrhea or constipation  -headache  -nausea, vomiting  -stomach  gas, heartburn  This list may not describe all possible side effects. Call your doctor for medical advice about side effects. You may report side effects to FDA at 5-879-QNF-7745.  Where should I keep my medicine?  Keep out of the reach of children.  Store at room temperature between 15 and 30 degrees C (59 and 86 degrees F). Protect from heat and moisture. Do not use this medicine if it has a strong vinegar smell. Throw away any unused medicine after the expiration date.  NOTE: This sheet is a summary. It may not cover all possible information. If you have questions about this medicine, talk to your doctor, pharmacist, or health care provider.  © 2018 [employer] (2014-08-19 11:30:31)      · Is patient discharged on Warfarin / Coumadin?   No     Depression / Suicide Risk    As you are discharged from this [facility] facility, it is important to learn how to keep safe from harming yourself.    Recognize the warning signs:  · Abrupt changes in personality, positive or negative- including increase in energy   · Giving away possessions  · Change in eating patterns- significant weight changes-  positive or negative  · Change in sleeping patterns- unable to sleep or sleeping all the time   · Unwillingness or inability to communicate  · Depression  · Unusual sadness, discouragement and loneliness  · Talk of wanting to die  · Neglect of personal appearance   · Rebelliousness- reckless behavior  · Withdrawal from people/activities they love  · Confusion- inability to concentrate     If you or a loved one observes any of these behaviors or has concerns about self-harm, here's what you can do:  · Talk about it- your feelings and reasons for harming yourself  · Remove any means that you might use to hurt yourself (examples: pills, rope, extension cords, firearm)  · Get professional help from the community (Mental Health, Substance Abuse, psychological counseling)  · Do not be alone:Call your Safe Contact- someone  whom you trust who will be there for you.  · Call your local CRISIS HOTLINE 527-1826 or 853-677-4787  · Call your local Children's Mobile Crisis Response Team [place] (752) 075-7288 or www.ezeep  · Call the toll free National Suicide Prevention Hotlines   · National Suicide Prevention Lifeline 397-232-RTTL (1845)  · [employer] 800-SUICIDE (489-9556)

## 2018-10-26 NOTE — PROGRESS NOTES
Report received at change of shift, care of pt assumed. Pt A&Ox4, sitting up in bed with wife at bedside. Pt denies any pain at this time. Surgical dressing is clean, dry, and in tact. Pt has 2+ distal pulses and denies any numbness or tingling. Polar ice machine is in place. Plan of care discussed and pt instructed to call for any needs or concerns, and prior to getting up out of bed. Safety precautions in place, will continue to monitor.

## 2018-11-01 ENCOUNTER — HOSPITAL ENCOUNTER (INPATIENT)
Facility: MEDICAL CENTER | Age: 58
LOS: 2 days | DRG: 175 | End: 2018-11-03
Attending: EMERGENCY MEDICINE | Admitting: HOSPITALIST
Payer: COMMERCIAL

## 2018-11-01 ENCOUNTER — APPOINTMENT (OUTPATIENT)
Dept: CARDIOLOGY | Facility: MEDICAL CENTER | Age: 58
DRG: 175 | End: 2018-11-01
Attending: HOSPITALIST
Payer: COMMERCIAL

## 2018-11-01 ENCOUNTER — APPOINTMENT (OUTPATIENT)
Dept: RADIOLOGY | Facility: MEDICAL CENTER | Age: 58
DRG: 175 | End: 2018-11-01
Attending: EMERGENCY MEDICINE
Payer: COMMERCIAL

## 2018-11-01 DIAGNOSIS — I26.99 OTHER ACUTE PULMONARY EMBOLISM WITHOUT ACUTE COR PULMONALE (HCC): ICD-10-CM

## 2018-11-01 PROBLEM — R79.89 AZOTEMIA: Status: ACTIVE | Noted: 2018-11-01

## 2018-11-01 PROBLEM — Z96.659 TOTAL KNEE REPLACEMENT STATUS: Status: ACTIVE | Noted: 2018-11-01

## 2018-11-01 PROBLEM — I10 HTN (HYPERTENSION): Status: ACTIVE | Noted: 2018-11-01

## 2018-11-01 PROBLEM — D72.829 LEUCOCYTOSIS: Status: ACTIVE | Noted: 2018-11-01

## 2018-11-01 PROBLEM — R55 SYNCOPE: Status: ACTIVE | Noted: 2018-11-01

## 2018-11-01 LAB
ALBUMIN SERPL BCP-MCNC: 4 G/DL (ref 3.2–4.9)
ALBUMIN/GLOB SERPL: 1.1 G/DL
ALP SERPL-CCNC: 50 U/L (ref 30–99)
ALT SERPL-CCNC: 20 U/L (ref 2–50)
ANION GAP SERPL CALC-SCNC: 10 MMOL/L (ref 0–11.9)
APPEARANCE UR: CLEAR
AST SERPL-CCNC: 22 U/L (ref 12–45)
BACTERIA #/AREA URNS HPF: NEGATIVE /HPF
BASOPHILS # BLD AUTO: 0.2 % (ref 0–1.8)
BASOPHILS # BLD: 0.03 K/UL (ref 0–0.12)
BILIRUB SERPL-MCNC: 0.9 MG/DL (ref 0.1–1.5)
BILIRUB UR QL STRIP.AUTO: NEGATIVE
BNP SERPL-MCNC: 2 PG/ML (ref 0–100)
BUN SERPL-MCNC: 29 MG/DL (ref 8–22)
CALCIUM SERPL-MCNC: 9.8 MG/DL (ref 8.5–10.5)
CHLORIDE SERPL-SCNC: 96 MMOL/L (ref 96–112)
CO2 SERPL-SCNC: 27 MMOL/L (ref 20–33)
COLOR UR: YELLOW
CREAT SERPL-MCNC: 1.33 MG/DL (ref 0.5–1.4)
EKG IMPRESSION: NORMAL
EOSINOPHIL # BLD AUTO: 0.01 K/UL (ref 0–0.51)
EOSINOPHIL NFR BLD: 0.1 % (ref 0–6.9)
EPI CELLS #/AREA URNS HPF: NEGATIVE /HPF
ERYTHROCYTE [DISTWIDTH] IN BLOOD BY AUTOMATED COUNT: 44.2 FL (ref 35.9–50)
GLOBULIN SER CALC-MCNC: 3.6 G/DL (ref 1.9–3.5)
GLUCOSE SERPL-MCNC: 115 MG/DL (ref 65–99)
GLUCOSE UR STRIP.AUTO-MCNC: NEGATIVE MG/DL
HCT VFR BLD AUTO: 46.5 % (ref 42–52)
HGB BLD-MCNC: 15.4 G/DL (ref 14–18)
HYALINE CASTS #/AREA URNS LPF: ABNORMAL /LPF
IMM GRANULOCYTES # BLD AUTO: 0.11 K/UL (ref 0–0.11)
IMM GRANULOCYTES NFR BLD AUTO: 0.8 % (ref 0–0.9)
KETONES UR STRIP.AUTO-MCNC: 15 MG/DL
LEUKOCYTE ESTERASE UR QL STRIP.AUTO: NEGATIVE
LYMPHOCYTES # BLD AUTO: 0.88 K/UL (ref 1–4.8)
LYMPHOCYTES NFR BLD: 6.3 % (ref 22–41)
MCH RBC QN AUTO: 28.2 PG (ref 27–33)
MCHC RBC AUTO-ENTMCNC: 33.1 G/DL (ref 33.7–35.3)
MCV RBC AUTO: 85 FL (ref 81.4–97.8)
MICRO URNS: ABNORMAL
MONOCYTES # BLD AUTO: 0.91 K/UL (ref 0–0.85)
MONOCYTES NFR BLD AUTO: 6.5 % (ref 0–13.4)
MUCOUS THREADS #/AREA URNS HPF: ABNORMAL /HPF
NEUTROPHILS # BLD AUTO: 11.96 K/UL (ref 1.82–7.42)
NEUTROPHILS NFR BLD: 86.1 % (ref 44–72)
NITRITE UR QL STRIP.AUTO: NEGATIVE
NRBC # BLD AUTO: 0 K/UL
NRBC BLD-RTO: 0 /100 WBC
PH UR STRIP.AUTO: 5.5 [PH]
PLATELET # BLD AUTO: 275 K/UL (ref 164–446)
PMV BLD AUTO: 10.9 FL (ref 9–12.9)
POTASSIUM SERPL-SCNC: 5.6 MMOL/L (ref 3.6–5.5)
PROT SERPL-MCNC: 7.6 G/DL (ref 6–8.2)
PROT UR QL STRIP: 30 MG/DL
RBC # BLD AUTO: 5.47 M/UL (ref 4.7–6.1)
RBC UR QL AUTO: NEGATIVE
SODIUM SERPL-SCNC: 133 MMOL/L (ref 135–145)
SP GR UR STRIP.AUTO: 1.02
TROPONIN I SERPL-MCNC: <0.01 NG/ML (ref 0–0.04)
UROBILINOGEN UR STRIP.AUTO-MCNC: 1 MG/DL
WBC # BLD AUTO: 13.9 K/UL (ref 4.8–10.8)
WBC #/AREA URNS HPF: ABNORMAL /HPF

## 2018-11-01 PROCEDURE — 36415 COLL VENOUS BLD VENIPUNCTURE: CPT

## 2018-11-01 PROCEDURE — 770020 HCHG ROOM/CARE - TELE (206)

## 2018-11-01 PROCEDURE — 84484 ASSAY OF TROPONIN QUANT: CPT

## 2018-11-01 PROCEDURE — 700111 HCHG RX REV CODE 636 W/ 250 OVERRIDE (IP): Performed by: EMERGENCY MEDICINE

## 2018-11-01 PROCEDURE — 81001 URINALYSIS AUTO W/SCOPE: CPT

## 2018-11-01 PROCEDURE — 99285 EMERGENCY DEPT VISIT HI MDM: CPT

## 2018-11-01 PROCEDURE — 700112 HCHG RX REV CODE 229: Performed by: HOSPITALIST

## 2018-11-01 PROCEDURE — 99223 1ST HOSP IP/OBS HIGH 75: CPT | Mod: AI | Performed by: HOSPITALIST

## 2018-11-01 PROCEDURE — 93306 TTE W/DOPPLER COMPLETE: CPT

## 2018-11-01 PROCEDURE — 700105 HCHG RX REV CODE 258: Performed by: EMERGENCY MEDICINE

## 2018-11-01 PROCEDURE — 700105 HCHG RX REV CODE 258: Performed by: HOSPITALIST

## 2018-11-01 PROCEDURE — 85025 COMPLETE CBC W/AUTO DIFF WBC: CPT

## 2018-11-01 PROCEDURE — A9270 NON-COVERED ITEM OR SERVICE: HCPCS | Performed by: HOSPITALIST

## 2018-11-01 PROCEDURE — 700117 HCHG RX CONTRAST REV CODE 255: Performed by: EMERGENCY MEDICINE

## 2018-11-01 PROCEDURE — 71275 CT ANGIOGRAPHY CHEST: CPT

## 2018-11-01 PROCEDURE — 80053 COMPREHEN METABOLIC PANEL: CPT

## 2018-11-01 PROCEDURE — 93005 ELECTROCARDIOGRAM TRACING: CPT | Performed by: EMERGENCY MEDICINE

## 2018-11-01 PROCEDURE — 96372 THER/PROPH/DIAG INJ SC/IM: CPT

## 2018-11-01 PROCEDURE — 93005 ELECTROCARDIOGRAM TRACING: CPT

## 2018-11-01 PROCEDURE — 83880 ASSAY OF NATRIURETIC PEPTIDE: CPT

## 2018-11-01 PROCEDURE — 93308 TTE F-UP OR LMTD: CPT | Mod: 26 | Performed by: INTERNAL MEDICINE

## 2018-11-01 RX ORDER — POLYETHYLENE GLYCOL 3350 17 G/17G
1 POWDER, FOR SOLUTION ORAL
Status: DISCONTINUED | OUTPATIENT
Start: 2018-11-01 | End: 2018-11-03 | Stop reason: HOSPADM

## 2018-11-01 RX ORDER — PROMETHAZINE HYDROCHLORIDE 25 MG/1
12.5-25 SUPPOSITORY RECTAL EVERY 4 HOURS PRN
Status: DISCONTINUED | OUTPATIENT
Start: 2018-11-01 | End: 2018-11-03 | Stop reason: HOSPADM

## 2018-11-01 RX ORDER — SODIUM CHLORIDE 9 MG/ML
1000 INJECTION, SOLUTION INTRAVENOUS ONCE
Status: COMPLETED | OUTPATIENT
Start: 2018-11-01 | End: 2018-11-01

## 2018-11-01 RX ORDER — DOCUSATE SODIUM 100 MG/1
100 CAPSULE, LIQUID FILLED ORAL 2 TIMES DAILY
Status: DISCONTINUED | OUTPATIENT
Start: 2018-11-01 | End: 2018-11-03 | Stop reason: HOSPADM

## 2018-11-01 RX ORDER — BISACODYL 10 MG
10 SUPPOSITORY, RECTAL RECTAL
Status: DISCONTINUED | OUTPATIENT
Start: 2018-11-01 | End: 2018-11-03 | Stop reason: HOSPADM

## 2018-11-01 RX ORDER — AMOXICILLIN 250 MG
2 CAPSULE ORAL 2 TIMES DAILY
Status: DISCONTINUED | OUTPATIENT
Start: 2018-11-01 | End: 2018-11-03 | Stop reason: HOSPADM

## 2018-11-01 RX ORDER — ONDANSETRON 4 MG/1
4 TABLET, ORALLY DISINTEGRATING ORAL EVERY 4 HOURS PRN
Status: DISCONTINUED | OUTPATIENT
Start: 2018-11-01 | End: 2018-11-03 | Stop reason: HOSPADM

## 2018-11-01 RX ORDER — PROMETHAZINE HYDROCHLORIDE 25 MG/1
12.5-25 TABLET ORAL EVERY 4 HOURS PRN
Status: DISCONTINUED | OUTPATIENT
Start: 2018-11-01 | End: 2018-11-03 | Stop reason: HOSPADM

## 2018-11-01 RX ORDER — OXYCODONE HYDROCHLORIDE 5 MG/1
5 TABLET ORAL EVERY 4 HOURS PRN
Status: DISCONTINUED | OUTPATIENT
Start: 2018-11-01 | End: 2018-11-03 | Stop reason: HOSPADM

## 2018-11-01 RX ORDER — ONDANSETRON 2 MG/ML
4 INJECTION INTRAMUSCULAR; INTRAVENOUS EVERY 4 HOURS PRN
Status: DISCONTINUED | OUTPATIENT
Start: 2018-11-01 | End: 2018-11-03 | Stop reason: HOSPADM

## 2018-11-01 RX ORDER — SODIUM CHLORIDE 9 MG/ML
1000 INJECTION, SOLUTION INTRAVENOUS CONTINUOUS
Status: DISCONTINUED | OUTPATIENT
Start: 2018-11-01 | End: 2018-11-03 | Stop reason: HOSPADM

## 2018-11-01 RX ORDER — ACETAMINOPHEN 325 MG/1
650 TABLET ORAL EVERY 6 HOURS PRN
Status: DISCONTINUED | OUTPATIENT
Start: 2018-11-01 | End: 2018-11-03 | Stop reason: HOSPADM

## 2018-11-01 RX ADMIN — SODIUM CHLORIDE 1000 ML: 9 INJECTION, SOLUTION INTRAVENOUS at 20:41

## 2018-11-01 RX ADMIN — DOCUSATE SODIUM 100 MG: 100 CAPSULE, LIQUID FILLED ORAL at 17:15

## 2018-11-01 RX ADMIN — SODIUM CHLORIDE 1000 ML: 9 INJECTION, SOLUTION INTRAVENOUS at 11:30

## 2018-11-01 RX ADMIN — SODIUM CHLORIDE 1000 ML: 9 INJECTION, SOLUTION INTRAVENOUS at 17:16

## 2018-11-01 RX ADMIN — ENOXAPARIN SODIUM 120 MG: 150 INJECTION SUBCUTANEOUS at 14:35

## 2018-11-01 RX ADMIN — IOHEXOL 75 ML: 350 INJECTION, SOLUTION INTRAVENOUS at 13:37

## 2018-11-01 ASSESSMENT — COGNITIVE AND FUNCTIONAL STATUS - GENERAL
SUGGESTED CMS G CODE MODIFIER MOBILITY: CK
SUGGESTED CMS G CODE MODIFIER DAILY ACTIVITY: CI
DRESSING REGULAR LOWER BODY CLOTHING: A LITTLE
MOVING FROM LYING ON BACK TO SITTING ON SIDE OF FLAT BED: A LOT
DAILY ACTIVITIY SCORE: 23
WALKING IN HOSPITAL ROOM: A LITTLE
MOVING TO AND FROM BED TO CHAIR: A LOT
CLIMB 3 TO 5 STEPS WITH RAILING: A LOT
MOBILITY SCORE: 17

## 2018-11-01 ASSESSMENT — ENCOUNTER SYMPTOMS
SHORTNESS OF BREATH: 0
VOMITING: 0
NAUSEA: 1
HEADACHES: 0

## 2018-11-01 ASSESSMENT — PATIENT HEALTH QUESTIONNAIRE - PHQ9
2. FEELING DOWN, DEPRESSED, IRRITABLE, OR HOPELESS: NOT AT ALL
SUM OF ALL RESPONSES TO PHQ9 QUESTIONS 1 AND 2: 0
1. LITTLE INTEREST OR PLEASURE IN DOING THINGS: NOT AT ALL

## 2018-11-01 ASSESSMENT — PAIN SCALES - GENERAL
PAINLEVEL_OUTOF10: 0
PAINLEVEL_OUTOF10: 3
PAINLEVEL_OUTOF10: 6
PAINLEVEL_OUTOF10: 0

## 2018-11-01 NOTE — ED TRIAGE NOTES
Pt to triage with   Chief Complaint   Patient presents with   • Syncope     two times today, pt family reports possibly convulsing, lips turned blue, and than he passed when pt woke up pt was confused when it happened the first time pt looked at family and was saying what - whats wrong.   • N/V     x2 days, poor appetite,   • Diplopia     started today, no HA reported   • Other     right knee replacement last thursday.     Pt A/O x4 in triage.  Neuro intact, no N/T or weakness.  No c/o chest pain.  Pt noted pain in right knee, took asa and aleve this morning.  Pt Informed regarding triage process and verbalized understanding to inform triage tech or RN for any changes in condition. Placed in lobby.

## 2018-11-01 NOTE — ED NOTES
Med Rec completed per patient  Allergies reviewed  No ORAL antibiotics in last 30 days    Patient stated he had 3 blue OTC pills this morning and doesn't know what it was (anti-inflammatory)

## 2018-11-01 NOTE — ED PROVIDER NOTES
ED Provider Note    Scribed for Brayden Euceda M.D. by Ranulfo Petersen. 11/1/2018, 10:48 AM.    Primary care provider: Candie Cano M.D.  Means of arrival: Walk In  History obtained from: Patient  History limited by: None     CHIEF COMPLAINT  Syncope     HPI  Sumeet Buenrostro is a 58 y.o. male with a PMHx of hypertension and hyperlipidemia who presents to the Emergency Department for syncope.   The patient experienced two syncopal episodes within 5 minute period this morning at 7:30 AM. The pateint stood up after having a bowel movement and fell forwards. The patient's wife was able to catch the patient, but states shortly after she stood the patient up he experienced a second syncopal episode falling backwards. The patient did not experience any trauma secondary to his syncope.   The patient describes feeling lightheaded and nauseas prior to his syncopal episodes this morning. He does not remember passing out or the events of his syncopal episode. The patient denies any history of syncope prior to today.   The patient complains of intermittent nausea over the last three days. He reports a decreased appetite in the last three days secondary to his nausea.     The patient underwent total right knee arthroplasty last week on 10/25/18, and complains of constant bilateral testicular tenderness since his surgery.   The patient denies any headache, leg swelling, shortness of breath, chest pain,       REVIEW OF SYSTEMS  Review of Systems   Respiratory: Negative for shortness of breath.    Cardiovascular: Negative for chest pain and leg swelling.   Gastrointestinal: Positive for nausea. Negative for vomiting.   Genitourinary:        Positive for bilateral testicular pain    Neurological: Negative for headaches.        Positive for syncope    All other systems reviewed and are negative.    PAST MEDICAL HISTORY   has a past medical history of High cholesterol and Hypertension.    SURGICAL HISTORY   has a past surgical  "history that includes other orthopedic surgery ('75); knee arthroplasty total (Right, 10/25/2018); and joint injection diagnostic (Left, 10/25/2018).    SOCIAL HISTORY  Social History   Substance Use Topics   • Smoking status: Never Smoker   • Smokeless tobacco: Current User     Types: Snuff, Chew   • Alcohol use No      History   Drug Use No     FAMILY HISTORY  History reviewed. No pertinent family history.    CURRENT MEDICATIONS  Home Medications     Reviewed by Brett Inman (Pharmacy Tech) on 11/01/18 at 1223  Med List Status: Complete   Medication Last Dose Status   aspirin  MG Tablet Delayed Response 11/1/2018 Active   Cholecalciferol (VITAMIN D) 2000 units Cap 10/31/2018 Active   docusate sodium 100 MG Cap 3 DAYS Active   lisinopril-hydrochlorothiazide (PRINZIDE, ZESTORETIC) 20-12.5 MG per tablet 11/1/2018 Active   oxyCODONE immediate-release (ROXICODONE) 5 MG Tab 4 DAYS Active   testosterone cypionate (DEPO-TESTOSTERONE) 200 MG/ML Solution injection 10/30/2018 Active              ALLERGIES  No Known Allergies    PHYSICAL EXAM  VITAL SIGNS: /71   Pulse 87   Temp 36.6 °C (97.8 °F)   Resp 17   Ht 1.905 m (6' 3\")   Wt (!) 125.2 kg (276 lb)   SpO2 96%   BMI 34.50 kg/m²     Constitutional:  No acute distress  HENT: Slightly dry mucous membranes  Eyes: No conjunctivitis or icterus  Neck: trachea is midline, no palpable thyroid  Lymphatic: No cervical lymphadenopathy  Cardiovascular: Regular rate and rhythm, no murmurs  Thorax & Lungs: Normal breath sounds, no rhonchi  Abdomen: Hypoactive bowel sounds. Soft, Non-tender  Skin:. no rash  Back: Non-tender, no CVA tenderness  Extremities:  Right leg has 1+ pretibial edema  Vascular: symmetric radial pulse  Neurologic: Normal gross motor    LABS  Labs Reviewed   CBC WITH DIFFERENTIAL - Abnormal; Notable for the following:        Result Value    WBC 13.9 (*)     MCHC 33.1 (*)     Neutrophils-Polys 86.10 (*)     Lymphocytes 6.30 (*)     " Neutrophils (Absolute) 11.96 (*)     Lymphs (Absolute) 0.88 (*)     Monos (Absolute) 0.91 (*)     All other components within normal limits   URINALYSIS,CULTURE IF INDICATED - Abnormal; Notable for the following:     Ketones 15 (*)     Protein 30 (*)     All other components within normal limits   COMP METABOLIC PANEL - Abnormal; Notable for the following:     Sodium 133 (*)     Potassium 5.6 (*)     Glucose 115 (*)     Bun 29 (*)     Globulin 3.6 (*)     All other components within normal limits   ESTIMATED GFR - Abnormal; Notable for the following:     GFR If Non  55 (*)     All other components within normal limits   URINE MICROSCOPIC (W/UA) - Abnormal; Notable for the following:     WBC 0-2 (*)     Hyaline Cast 3-5 (*)     All other components within normal limits   BTYPE NATRIURETIC PEPTIDE   TROPONIN     All labs reviewed by me.    EKG Interpretation  Interpreted by me  Normal Sinus Rhythm. Rate 71  Normal QTc  Normal QRS  Normal Axis  Normal EKG with nonspecific T wave changes.     RADIOLOGY  CT-CTA CHEST PULMONARY ARTERY W/ RECONS   Final Result      1.  Multiple bilateral segmental and subsegmental pulmonary emboli as well as some right lobar emboli seen. No evidence of right heart strain.      2.  Fatty change of the liver.      This was discussed with SHONDA LYNCH at 1:48 PM on 11/1/2018.      US-EXTREMITY VENOUS LOWER BILAT    (Results Pending)   EC-ECHOCARDIOGRAM COMPLETE W/O CONT    (Results Pending)     The radiologist's interpretation of all radiological studies have been reviewed by me.    COURSE & MEDICAL DECISION MAKING  Pertinent Labs & Imaging studies reviewed. (See chart for details)    10:48 AM - Patient seen and examined at bedside.. Ordered CTA Chest, CMP, GFR, troponin, CBC, BNP, urinalysis, EKG to evaluate his symptoms. The differential diagnoses include but are not limited to: rule out PE, pneumonia.    The patient was resuscitated with IV fluids for renal protection  secondary to CT with contrast.     1:51 PM Spoke with radiologist regarding imaging results showing: Bilateral PE's, no right heart strain. Patient will be treated with Lovenox 120 mg IV.     2:33 PM Spoke with Dr. Herrera, Hospitalist, about the patient's condition.   Renal function is protected, there was a good response to resuscitation with IV fluids.         Disposition:  Patient will be admitted to Telemetry under the care of Dr. Herrera (Hospitalist) in guarded condition.     FINAL IMPRESSION  1. Other acute pulmonary embolism without acute cor pulmonale (HCC)          Ranulfo LAFLEUR (Scribe), am scribing for, and in the presence of, Brayden Euceda M.D..    Electronically signed by: Ranulfo Petersen (Scribe), 11/1/2018    Brayden LAFLEUR M.D. personally performed the services described in this documentation, as scribed by Ranulfo Petersen in my presence, and it is both accurate and complete.    The note accurately reflects work and decisions made by me.  Brayden Euceda  11/1/2018  4:46 PM

## 2018-11-01 NOTE — PROGRESS NOTES
Patient transported from ED to Tele 7 on a gurney while being monitored on a zoll. Patient connected to telemetry monitor upon arrival. Patient transferred over to hospital bed. Bed locked and in lowest position. Call light within reach. Vital signs taken. No additional needs at this time.

## 2018-11-02 ENCOUNTER — APPOINTMENT (OUTPATIENT)
Dept: RADIOLOGY | Facility: MEDICAL CENTER | Age: 58
DRG: 175 | End: 2018-11-02
Attending: HOSPITALIST
Payer: COMMERCIAL

## 2018-11-02 PROBLEM — J96.01 ACUTE RESPIRATORY FAILURE WITH HYPOXIA (HCC): Status: ACTIVE | Noted: 2018-11-02

## 2018-11-02 LAB
ANION GAP SERPL CALC-SCNC: 10 MMOL/L (ref 0–11.9)
APTT PPP: 30.7 SEC (ref 24.7–36)
BUN SERPL-MCNC: 25 MG/DL (ref 8–22)
CALCIUM SERPL-MCNC: 9.3 MG/DL (ref 8.5–10.5)
CHLORIDE SERPL-SCNC: 99 MMOL/L (ref 96–112)
CO2 SERPL-SCNC: 24 MMOL/L (ref 20–33)
CREAT SERPL-MCNC: 1.04 MG/DL (ref 0.5–1.4)
ERYTHROCYTE [DISTWIDTH] IN BLOOD BY AUTOMATED COUNT: 43.8 FL (ref 35.9–50)
GLUCOSE SERPL-MCNC: 105 MG/DL (ref 65–99)
HCT VFR BLD AUTO: 44.9 % (ref 42–52)
HGB BLD-MCNC: 14.4 G/DL (ref 14–18)
INR PPP: 1.04 (ref 0.87–1.13)
LV EJECT FRACT  99904: 60
LV EJECT FRACT MOD 2C 99903: 44.2
LV EJECT FRACT MOD 4C 99902: 60.49
LV EJECT FRACT MOD BP 99901: 54.03
MCH RBC QN AUTO: 27.2 PG (ref 27–33)
MCHC RBC AUTO-ENTMCNC: 32.1 G/DL (ref 33.7–35.3)
MCV RBC AUTO: 84.9 FL (ref 81.4–97.8)
PLATELET # BLD AUTO: 288 K/UL (ref 164–446)
PMV BLD AUTO: 10.8 FL (ref 9–12.9)
POTASSIUM SERPL-SCNC: 4.1 MMOL/L (ref 3.6–5.5)
PROTHROMBIN TIME: 13.7 SEC (ref 12–14.6)
RBC # BLD AUTO: 5.29 M/UL (ref 4.7–6.1)
SODIUM SERPL-SCNC: 133 MMOL/L (ref 135–145)
WBC # BLD AUTO: 11 K/UL (ref 4.8–10.8)

## 2018-11-02 PROCEDURE — A9270 NON-COVERED ITEM OR SERVICE: HCPCS | Performed by: FAMILY MEDICINE

## 2018-11-02 PROCEDURE — 97162 PT EVAL MOD COMPLEX 30 MIN: CPT

## 2018-11-02 PROCEDURE — 700111 HCHG RX REV CODE 636 W/ 250 OVERRIDE (IP): Performed by: HOSPITALIST

## 2018-11-02 PROCEDURE — A9270 NON-COVERED ITEM OR SERVICE: HCPCS | Performed by: HOSPITALIST

## 2018-11-02 PROCEDURE — 85027 COMPLETE CBC AUTOMATED: CPT

## 2018-11-02 PROCEDURE — 85610 PROTHROMBIN TIME: CPT

## 2018-11-02 PROCEDURE — 99232 SBSQ HOSP IP/OBS MODERATE 35: CPT | Performed by: INTERNAL MEDICINE

## 2018-11-02 PROCEDURE — 36415 COLL VENOUS BLD VENIPUNCTURE: CPT

## 2018-11-02 PROCEDURE — G8979 MOBILITY GOAL STATUS: HCPCS | Mod: CI

## 2018-11-02 PROCEDURE — 700102 HCHG RX REV CODE 250 W/ 637 OVERRIDE(OP): Performed by: HOSPITALIST

## 2018-11-02 PROCEDURE — A9270 NON-COVERED ITEM OR SERVICE: HCPCS | Performed by: INTERNAL MEDICINE

## 2018-11-02 PROCEDURE — 85730 THROMBOPLASTIN TIME PARTIAL: CPT

## 2018-11-02 PROCEDURE — 700112 HCHG RX REV CODE 229: Performed by: HOSPITALIST

## 2018-11-02 PROCEDURE — 700105 HCHG RX REV CODE 258: Performed by: HOSPITALIST

## 2018-11-02 PROCEDURE — 700102 HCHG RX REV CODE 250 W/ 637 OVERRIDE(OP): Performed by: FAMILY MEDICINE

## 2018-11-02 PROCEDURE — 770020 HCHG ROOM/CARE - TELE (206)

## 2018-11-02 PROCEDURE — G8978 MOBILITY CURRENT STATUS: HCPCS | Mod: CK

## 2018-11-02 PROCEDURE — 80048 BASIC METABOLIC PNL TOTAL CA: CPT

## 2018-11-02 PROCEDURE — 700102 HCHG RX REV CODE 250 W/ 637 OVERRIDE(OP): Performed by: INTERNAL MEDICINE

## 2018-11-02 RX ORDER — LISINOPRIL 20 MG/1
20 TABLET ORAL
Status: DISCONTINUED | OUTPATIENT
Start: 2018-11-02 | End: 2018-11-03 | Stop reason: HOSPADM

## 2018-11-02 RX ORDER — OXYCODONE HYDROCHLORIDE 10 MG/1
10 TABLET ORAL ONCE
Status: COMPLETED | OUTPATIENT
Start: 2018-11-02 | End: 2018-11-02

## 2018-11-02 RX ORDER — OXYCODONE HYDROCHLORIDE 5 MG/1
5 TABLET ORAL ONCE
Status: DISCONTINUED | OUTPATIENT
Start: 2018-11-02 | End: 2018-11-02

## 2018-11-02 RX ORDER — OXYCODONE HYDROCHLORIDE 5 MG/1
5 TABLET ORAL ONCE
Status: COMPLETED | OUTPATIENT
Start: 2018-11-02 | End: 2018-11-02

## 2018-11-02 RX ADMIN — OXYCODONE HYDROCHLORIDE 5 MG: 5 TABLET ORAL at 23:59

## 2018-11-02 RX ADMIN — OXYCODONE HYDROCHLORIDE 5 MG: 5 TABLET ORAL at 19:43

## 2018-11-02 RX ADMIN — RIVAROXABAN 15 MG: 15 TABLET, FILM COATED ORAL at 17:23

## 2018-11-02 RX ADMIN — SODIUM CHLORIDE 1000 ML: 9 INJECTION, SOLUTION INTRAVENOUS at 05:57

## 2018-11-02 RX ADMIN — ENOXAPARIN SODIUM 120 MG: 150 INJECTION SUBCUTANEOUS at 05:36

## 2018-11-02 RX ADMIN — SODIUM CHLORIDE 1000 ML: 9 INJECTION, SOLUTION INTRAVENOUS at 19:43

## 2018-11-02 RX ADMIN — LISINOPRIL 20 MG: 20 TABLET ORAL at 12:24

## 2018-11-02 RX ADMIN — OXYCODONE HYDROCHLORIDE 10 MG: 10 TABLET ORAL at 17:23

## 2018-11-02 RX ADMIN — OXYCODONE HYDROCHLORIDE 5 MG: 5 TABLET ORAL at 23:16

## 2018-11-02 RX ADMIN — DOCUSATE SODIUM 100 MG: 100 CAPSULE, LIQUID FILLED ORAL at 17:23

## 2018-11-02 RX ADMIN — OXYCODONE HYDROCHLORIDE 5 MG: 5 TABLET ORAL at 15:06

## 2018-11-02 RX ADMIN — VITAMIN D, TAB 1000IU (100/BT) 2000 UNITS: 25 TAB at 05:36

## 2018-11-02 ASSESSMENT — PAIN SCALES - GENERAL
PAINLEVEL_OUTOF10: 10
PAINLEVEL_OUTOF10: 5
PAINLEVEL_OUTOF10: 8
PAINLEVEL_OUTOF10: 0
PAINLEVEL_OUTOF10: 0
PAINLEVEL_OUTOF10: 3
PAINLEVEL_OUTOF10: 7

## 2018-11-02 ASSESSMENT — ENCOUNTER SYMPTOMS
PSYCHIATRIC NEGATIVE: 1
CARDIOVASCULAR NEGATIVE: 1
MUSCULOSKELETAL NEGATIVE: 1
NEUROLOGICAL NEGATIVE: 1
CONSTITUTIONAL NEGATIVE: 1
RESPIRATORY NEGATIVE: 1
EYES NEGATIVE: 1
GASTROINTESTINAL NEGATIVE: 1

## 2018-11-02 ASSESSMENT — COGNITIVE AND FUNCTIONAL STATUS - GENERAL
MOBILITY SCORE: 17
SUGGESTED CMS G CODE MODIFIER MOBILITY: CK
MOVING TO AND FROM BED TO CHAIR: UNABLE
MOVING FROM LYING ON BACK TO SITTING ON SIDE OF FLAT BED: UNABLE
CLIMB 3 TO 5 STEPS WITH RAILING: A LITTLE

## 2018-11-02 ASSESSMENT — GAIT ASSESSMENTS
GAIT LEVEL OF ASSIST: STAND BY ASSIST
DEVIATION: STEP TO
ASSISTIVE DEVICE: FRONT WHEEL WALKER
DISTANCE (FEET): 200

## 2018-11-02 NOTE — DIETARY
"Nutrition services: Day 1 of admit.  Sumeet Buenrostro is a 58 y.o. male with admitting DX of pulmonary emboli.  Pt noted with poor PO intake and wt loss on nutrition admit screen.  Pt appears nourished.  RD visited pt at bedside to discuss appetite, PO intake, and wt history.  Pt stated to RD that he didn't feel well and was tired.  RD asked if he would prefer her to come back later - pt nodded.  RD to follow-up as appropriate.    Assessment:  Height: 190.5 cm (6' 3\")  Weight: (!) 125.2 kg (276 lb)  Body mass index is 34.5 kg/m².  - Obese Class I.  Diet/Intake: Regular.  Per chart, pt consumed 50-75% x 1 meal documented.    Evaluation:   1. Pt noted with syncope, ARF with hypoxia, HTN, and azotemia.  2. Per H&P, pt presented with decreased appetite and nausea for the past 2 days.  3. Per chart review, pt weighed 128.4 kg on 10/4/2017 office visit and wt upon current admit is 125.2 kg via stand up scale.  This is a 2.5% wt loss over the past year, which is not severe however notable.    4. Labs: Sodium: 133, Glucose: 105, BUN: 25.  Other labs and meds reviewed at this time.  5. Last BM: PTA.    Recommendations/Plan:  1. Encourage intake of meals.  2. Document intake of all meals as % taken in ADL's to provide interdisciplinary communication across all shifts.   3. Monitor weight.  4. Nutrition rep will continue to see patient for ongoing meal and snack preferences.     RD following.        "

## 2018-11-02 NOTE — PROGRESS NOTES
Pt sitting up in bed throughout morning. Family at bedside for most of morning. No signs of distress or complaints of pain throughout morning. Call light and belongings within reach. Bed locked and in lowest position. Will continue to monitor.

## 2018-11-02 NOTE — ASSESSMENT & PLAN NOTE
- bilateral as noted on CTA chest   - LE dopplers pending; ECHO noted  - was started on Lovenox and switching to Xarelto  - cont O2 supplementation and wean as tolerated

## 2018-11-02 NOTE — PROGRESS NOTES
Bedside report received with RN. Assumed care of patient. Pt sitting up in bed, no signs of distress and no complaints of pain. Call light and belongings within reach. Bed locked and in lowest position.

## 2018-11-02 NOTE — THERAPY
"Physical Therapy Evaluation completed.   Bed Mobility:  Supine to Sit: Minimal Assist (flat HOB; )  Transfers: Sit to Stand: Stand by Assist (with FWW, does not bear weight through R LE)  Gait: Level Of Assist: Stand by Assist with Front-Wheel Walker     200ft   Plan of Care: Will benefit from Physical Therapy 3 times per week  Discharge Recommendations: Equipment: No Equipment Needed.     Pt presents with impaired activity tolerance and limited knee range s/p right TKA. Pt is most limited by range, not achieving full knee extension, ~ 10 deg lacking of neutral, flexion to 50 deg with painful end feel; pt reporting he thought he was not allowed to perform knee flexion and has not been performing exercises; reinstructed, will follow while in house anticipate pt will be able to return home when medically appropriate to do so, needs to follow up ASAP with outpt PT to progress ROM to reduce antalgic gait.     See \"Rehab Therapy-Acute\" Patient Summary Report for complete documentation.     "

## 2018-11-02 NOTE — PROGRESS NOTES
"Subjective  Patient underwent right TKA on 10/25.  Patient admitted for postoperative bilateral pulmonary embolism.  Patient currently denies any change in strength or sensation throughout right lower extremity.      Physical Exam  General: Patient is resting comfortably in bed.  No acute distress.    Lower Extremity: Sterile silver dressing is clean, dry, and intact.  Patient clearly fires tibialis anterior, EHL, and gastrocnemius/soleus.  Sensation is intact to light touch throughout superficial peroneal, deep peroneal, and tibial nerve distributions.  Strong and palpable 2+ dorsalis pedis and posterior tibial pulses with capillary refill less than 2 seconds.  No lower leg tenderness or discomfort.    Blood pressure 132/83, pulse 69, temperature 36.7 °C (98 °F), resp. rate 18, height 1.905 m (6' 3\"), weight (!) 125.2 kg (276 lb), SpO2 96 %.    Recent Labs      11/01/18   1140  11/02/18   0343   WBC  13.9*  11.0*   RBC  5.47  5.29   HEMOGLOBIN  15.4  14.4   HEMATOCRIT  46.5  44.9   MCV  85.0  84.9   MCH  28.2  27.2   MCHC  33.1*  32.1*   RDW  44.2  43.8   PLATELETCT  275  288   MPV  10.9  10.8     Recent Labs      11/01/18   1140  11/02/18   0343   SODIUM  133*  133*   POTASSIUM  5.6*  4.1   CHLORIDE  96  99   CO2  27  24   GLUCOSE  115*  105*   BUN  29*  25*   CREATININE  1.33  1.04   CALCIUM  9.8  9.3     Recent Labs      11/01/18   1140   TROPONINI  <0.01   BNPBTYPENAT  2     Recent Labs      11/02/18   0343   APTT  30.7   INR  1.04       Intake/Output Summary (Last 24 hours) at 11/02/18 0645  Last data filed at 11/02/18 0557   Gross per 24 hour   Intake              960 ml   Output              725 ml   Net              235 ml       Assessment  - Status post right TKA 10/25/2018  - Postoperative bilateral pulmonary emoblism    Plan  - Pharmacologic full dose Lovenox as per Internal Medicine team recommendations  - Appreciate Internal Medicine assistance  - Hemoglobin: 14.4, hemodynamically stable  - " Activity/PT: WBAT RLE, ROM as tolerated - PT to evaluate patient during hospital stay  - Disposition: Patient doing well at this time.  Continue PT in hospital - consult placed.  Outpatient clinic follow-up with Dr. Christopher next week.

## 2018-11-02 NOTE — H&P
DATE OF ADMISSION:  11/01/2018    IDENTIFICATION:  A 58-year-old male.    PRIMARY CARE PHYSICIAN:  Dr. Cano.    CHIEF COMPLAINT:  Syncope.    HISTORY OF PRESENT ILLNESS:  This is a 58-year-old male who had a total knee   arthroplasty on the right on 10/25 by Dr. Christopher.  The patient was sent   home and had some difficulty with ambulations postoperatively secondary to   pain and has fear of falling over his dogs at home, so the patient was fairly   sedentary.  The patient presents now with decreased appetite, nausea, not   feeling well for the last day or 2.  This morning at 07:30, he was trying to   get to the toilet and syncopized.  He did not fall.  His wife was able to   assist him to the ground.  After he was trying to stand up, again he almost   syncopized.  Again, he was sweaty, had dizziness and nausea, and was brought   to the emergency room and was found to have a pulmonary embolism bilaterally.    The patient was started on anticoagulation.  He was discharged on b.i.d. full   dose aspirin after his knee operation.  The patient denies any additional   difficulties.  He had no clotting before.  He suffers from hypertension,   dyslipidemia, and is working as a metal .  The patient will be   admitted for further evaluation and treatment.    ALLERGIES:  None.    PRESCRIPTION MEDICATIONS:  1.  Aspirin 325 mg p.o. b.i.d.  2.  Vitamin D 2000 units daily.  3.  Docusate sodium 100 mg b.i.d.  4.  Prinzide 20/12.5 p.o. daily.  5.  Oxycodone 5 mg every 4 hours p.r.n.  6.  Depo-Testosterone every 10 days injected.    PAST MEDICAL HISTORY:  1.  Hypertension.  2.  Dyslipidemia.  3.  Dietary obesity.    PAST SURGICAL HISTORY:  1.  Status post right total knee arthroplasty on 10/25.  2.  Bilateral prior knee arthroscopy, otherwise negative.    SOCIAL HISTORY:  The patient is a nonsmoker and nondrinker.  He denies drug   use.  He is .  He works as a metal .    FAMILY HISTORY:   Reported negative for clotting conditions, heart disease, or   strokes.    REVIEW OF SYSTEMS:  CONSTITUTIONAL:  He denies any fevers or chills.  HEENT:  No headache, no visual changes.  CARDIOVASCULAR:  No chest pain, palpitation, PND or orthopnea.  LUNGS:  Without cough or sputum production.  Denies pain with deep   inspiration.  GASTROINTESTINAL:  He has some mild nausea.  No diarrhea, no constipation.  GENITOURINARY:  No dysuria or hematuria.  MUSCULOSKELETAL:  Without back pain.  He does have right-sided joint pain from   his knee operation, otherwise negative.  NEUROLOGIC:  Without focal weakness, numbness, or tingling.  SKIN:  Without complaints.  GENITOURINARY:  He does have some mild discomfort apparently in his testicles.    PHYSICAL EXAMINATION:  VITAL SIGNS:  The patient is found to have a temperature of 36.6, pulse 75,   respirations 16, and blood pressure of 112/75.  The patient is saturating 97%   on room air.  GENERAL:  This is a pleasant  male, in no acute distress, no acute   respiratory distress.  Well developed, well nourished.  HEENT:  Normocephalic and atraumatic.  EOMI.  PERRLA. Mucous membranes are   moist.  Nasopharynx is otherwise clear.  NECK:  Stiff range of motion.  No lymphadenopathy or thyromegaly.  CHEST:  Normal bony structures.  LUNGS:  Clear to auscultation anteriorly.  HEART:  Regular rate.  S1 and S2 are normal.  No S3, S4.  ABDOMEN:  Protuberant.  There is no tenderness, no distention.  GENITOURINARY:  Normal external male genitalia.  RECTAL:  Exam is deferred.  MUSCULOSKELETAL:  No clubbing or cyanosis.  There is right lower extremity   edema, dependent that needs in a dressing.  There is no drainage.  There is no   redness.  There is some mild calf tenderness noted.  NEUROLOGIC:  The patient is alert and oriented x4.  Cranial nerves II-XII are   grossly intact.    LABORATORY DATA AND IMAGING:  White cell count is 13.9, neutrophils 86,   otherwise normal CBC.  Chemistry  is abnormal for a sodium of 133, potassium of   5.6, glucose of 150, and BUN of 29.  His troponin is negative.  His BNP is   low.  Urinalysis is negative.  CT of the chest shows bilateral pulmonary   emboli, segmental and subsegmental.  There is no evidence of right heart   strain, fatty change of the liver is noted.  EKG shows a sinus rhythm, rate of   71 without acute ST-T changes.    ASSESSMENT AND PLAN:  1.  Acute pulmonary emboli bilaterally postoperatively 7 days after a knee   surgery.  The patient, at this time, will be placed on Lovenox full dose.  He   will have a lower extremity deep venous thrombosis study as well as an   echocardiogram and then decision to be made at which oral anticoagulation the   patient can be discharged.  The patient is currently surprisingly asymptomatic   except for his syncope.  He will be slightly hydrated.  Correct his   electrolytes and further evaluate it in the morning with followup laboratory   data.  2.  Syncope secondary to his pulmonary embolism, likely with reduced blood   return and reduce preload.  The patient again will be slightly hydrated.  He   will be monitored and started on full anticoagulation.  3.  Total knee arthroplasty, Dr. Christopher was notified of the patient's   admission.  4.  Question of lower extremity deep venous thrombosis ultrasound is pending.  5.  Mild leukocytosis, likely reactive.  6.  Mild hyponatremia and elevated potassium will be followed after his   rehydration.  7.  Dyslipidemia.  Continue his medication regimen.  8.  History of hypertension.  We will hold his blood pressure medication at   this time and reevaluate after the patient is overall improved.    OVERALL PLAN:  The patient is admitted with bilateral pulmonary emboli leading   to a syncopal event thus following a right total knee arthroplasty.  The   patient will be placed on full dose anticoagulation, will be monitored   clinically on telemetry.  Follow up echocardiogram and  lower extremity DVT   study is currently pending.  The patient will require 2+ overnight stay,   certainly has significant risk with his current extensive pulmonary emboli is   noted.  For full further details, please refer to the computer system and   paper chart.    Time spent on this admission is 55 minutes.       ____________________________________     MD SHAAN STONE / RUY    DD:  11/01/2018 20:23:45  DT:  11/01/2018 23:26:50    D#:  [id number]  Job#:  [id number]

## 2018-11-02 NOTE — PROGRESS NOTES
2 RN skin check completed:    No open wounds. Skin intact. Incision from right knee surgery from 1 week prior per patient report. Incision is covered by dressing that is CDI.

## 2018-11-02 NOTE — PROGRESS NOTES
Upon chart review, pt noted to have potassium level of 5.6 on admission. No mention of it in current notes. Page out to Dr. Herrera to make aware.    2057- Call back from Dr. Herrera, thinks potassium is dehydration related, continue IVF and encourage PO. Re check in morning.

## 2018-11-02 NOTE — ASSESSMENT & PLAN NOTE
- improved with gentle IVFs and holding BP meds  - restarted lisinopril and continuing IVFs; monitoring Cr

## 2018-11-02 NOTE — PROGRESS NOTES
Hospital Medicine Daily Progress Note    Date of Service  11/2/2018    Chief Complaint  58 y.o. male admitted 11/1/2018 with acute respiratory failure with hypoxia and syncope    Hospital Course    59yo M with PMHx of HTN and recent R TKA was discharged on full dose ASA BID was admitted for acute respiratory failure with hypoxia and syncope due to bilateral pulmonary emboli noted on CTA-chest on admission.  Patient was started on O2 supplementation and full dose Lovenox.  ECHO was obtained and unremarkable.  LE dopplers are currently pending.  Patient was switched to Xarelto.  Renal function was slightly compromised on admission but improved with gentle IVFs.  Patient was weaned off O2 to room air.        Interval Problem Update  Pt denies any SOB or chest pain.    Consultants/Specialty  Orthopedics    Code Status  FULL    Disposition  Home when medically stable    Review of Systems  Review of Systems   Constitutional: Negative.    HENT: Negative.    Eyes: Negative.    Respiratory: Negative.    Cardiovascular: Negative.    Gastrointestinal: Negative.    Musculoskeletal: Negative.    Skin: Negative.    Neurological: Negative.    Psychiatric/Behavioral: Negative.         Physical Exam  Temp:  [36.7 °C (98 °F)-37.2 °C (99 °F)] 37.1 °C (98.8 °F)  Pulse:  [69-79] 69  Resp:  [16-18] 16  BP: (102-132)/(67-89) 130/89    Physical Exam   Constitutional: He is oriented to person, place, and time. He appears well-nourished. No distress.   HENT:   Head: Normocephalic and atraumatic.   Mouth/Throat: No oropharyngeal exudate.   Eyes: Pupils are equal, round, and reactive to light. Conjunctivae and EOM are normal. No scleral icterus.   Neck: Normal range of motion. Neck supple.   Cardiovascular: Exam reveals no gallop and no friction rub.    No murmur heard.  Pulmonary/Chest: Effort normal and breath sounds normal. No respiratory distress. He exhibits no tenderness.   Abdominal: Soft. Bowel sounds are normal. He exhibits no  distension. There is no tenderness.   Musculoskeletal: He exhibits no edema, tenderness or deformity.   Limited ROM in right knee, bandaged   Neurological: He is alert and oriented to person, place, and time. He has normal reflexes. No cranial nerve deficit.   Skin: Skin is warm and dry.   Psychiatric: He has a normal mood and affect. His behavior is normal.   Nursing note and vitals reviewed.      Fluids    Intake/Output Summary (Last 24 hours) at 11/02/18 1032  Last data filed at 11/02/18 0800   Gross per 24 hour   Intake              960 ml   Output             1100 ml   Net             -140 ml       Laboratory  Recent Labs      11/01/18   1140  11/02/18   0343   WBC  13.9*  11.0*   RBC  5.47  5.29   HEMOGLOBIN  15.4  14.4   HEMATOCRIT  46.5  44.9   MCV  85.0  84.9   MCH  28.2  27.2   MCHC  33.1*  32.1*   RDW  44.2  43.8   PLATELETCT  275  288   MPV  10.9  10.8     Recent Labs      11/01/18   1140  11/02/18   0343   SODIUM  133*  133*   POTASSIUM  5.6*  4.1   CHLORIDE  96  99   CO2  27  24   GLUCOSE  115*  105*   BUN  29*  25*   CREATININE  1.33  1.04   CALCIUM  9.8  9.3     Recent Labs      11/02/18   0343   APTT  30.7   INR  1.04     Recent Labs      11/01/18   1140   BNPBTYPENAT  2           Imaging  EC-ECHOCARDIOGRAM COMPLETE W/O CONT   Final Result      CT-CTA CHEST PULMONARY ARTERY W/ RECONS   Final Result      1.  Multiple bilateral segmental and subsegmental pulmonary emboli as well as some right lobar emboli seen. No evidence of right heart strain.      2.  Fatty change of the liver.      This was discussed with SHONDA LYNCH at 1:48 PM on 11/1/2018.      US-EXTREMITY VENOUS LOWER BILAT    (Results Pending)        Assessment/Plan  * Pulmonary embolism (HCC)- (present on admission)   Assessment & Plan    - bilateral as noted on CTA chest   - LE dopplers pending; ECHO noted  - was started on Lovenox and switching to Xarelto  - cont O2 supplementation and wean as tolerated        Syncope- (present on  admission)   Assessment & Plan    - most likely due to acute PEs  - cont monitoring on TELE  - cont AC as noted above        Acute respiratory failure with hypoxia (HCC)- (present on admission)   Assessment & Plan    - due to bilateral PEs  - cont O2 supplementation and wean as tolerated  - cont AC as noted above        Total knee replacement status- (present on admission)   Assessment & Plan    - Ortho recs appreciated  - cont mgmt per recs        Azotemia- (present on admission)   Assessment & Plan    - improved with gentle IVFs and holding BP meds  - restarted lisinopril and continuing IVFs; monitoring Cr        Leucocytosis- (present on admission)   Assessment & Plan    - most likely reactive  - improving; monitoring        HTN (hypertension)- (present on admission)   Assessment & Plan    - stable; will restart lisinopril and cont holding HCTZ  - cont monitoring             VTE prophylaxis: Eliquis

## 2018-11-02 NOTE — DISCHARGE SUMMARY
Discharge Summary    CHIEF COMPLAINT ON ADMISSION  Chief Complaint   Patient presents with   • Syncope     two times today, pt family reports possibly convulsing, lips turned blue, and than he passed when pt woke up pt was confused when it happened the first time pt looked at family and was saying what - whats wrong.   • N/V     x2 days, poor appetite,   • Diplopia     started today, no HA reported   • Other     right knee replacement last thursday.       Reason for Admission  Syncope     Admission Date  11/1/2018    CODE STATUS  Full Code    HPI & HOSPITAL COURSE  57yo M with PMHx of HTN and recent R TKA was discharged on full dose ASA BID was admitted for acute respiratory failure with hypoxia and syncope due to bilateral pulmonary emboli noted on CTA-chest on admission.  Patient was started on O2 supplementation and full dose Lovenox.  ECHO was obtained and unremarkable.  LE dopplers revealed RLE DVT in calf.  Patient was switched to Xarelto.  Renal function was slightly compromised on admission but improved with gentle IVFs.  At this time, patient is medically stable to be discharged on Xarelto.       Therefore, he is discharged in fair and stable condition to home with close outpatient follow-up.    The patient met 2-midnight criteria for an inpatient stay at the time of discharge.    Discharge Date  11/3/2018    FOLLOW UP ITEMS POST DISCHARGE  F/U with PCP in 2 weeks    DISCHARGE DIAGNOSES  Principal Problem:    Pulmonary embolism (HCC) POA: Yes  Active Problems:    Syncope POA: Yes    Total knee replacement status POA: Yes    Acute respiratory failure with hypoxia (HCC) POA: Yes    HTN (hypertension) POA: Yes    Leucocytosis POA: Yes    Azotemia POA: Yes  Resolved Problems:    * No resolved hospital problems. *      FOLLOW UP  No future appointments.  No follow-up provider specified.    MEDICATIONS ON DISCHARGE     Medication List      START taking these medications      Instructions   * rivaroxaban 15 MG  Tabs tablet  Commonly known as:  XARELTO   Take 1 Tab by mouth 2 Times a Day for 21 days.  Dose:  15 mg     * rivaroxaban 20 MG Tabs tablet  Start taking on:  11/23/2018  Commonly known as:  XARELTO   Take 1 Tab by mouth with dinner for 30 days.  Dose:  20 mg        * This list has 2 medication(s) that are the same as other medications prescribed for you. Read the directions carefully, and ask your doctor or other care provider to review them with you.            CONTINUE taking these medications      Instructions   docusate sodium 100 MG Caps   Take 100 mg by mouth 2 Times a Day.  Dose:  100 mg     lisinopril-hydrochlorothiazide 20-12.5 MG per tablet  Commonly known as:  PRINZIDE, ZESTORETIC   Take 1 Tab by mouth every day.  Dose:  1 Tab     oxyCODONE immediate-release 5 MG Tabs  Commonly known as:  ROXICODONE   Take 1 Tab by mouth every four hours as needed (pain) for up to 10 days.  Dose:  5 mg     testosterone cypionate 200 MG/ML Soln injection  Commonly known as:  DEPO-TESTOSTERONE   200 mg by Intramuscular route every 10 days.  Dose:  200 mg     Vitamin D 2000 units Caps   Take 1 Cap by mouth every day.  Dose:  1 Cap        STOP taking these medications    Aspirin 325 MG Tbec            Allergies  No Known Allergies    DIET  Orders Placed This Encounter   Procedures   • Diet Order Regular     Standing Status:   Standing     Number of Occurrences:   1     Order Specific Question:   Diet:     Answer:   Regular [1]       ACTIVITY  As tolerated.  Weight bearing as tolerated    CONSULTATIONS  Orthopedics    PROCEDURES  NONE    LABORATORY  Lab Results   Component Value Date    SODIUM 133 (L) 11/02/2018    POTASSIUM 4.1 11/02/2018    CHLORIDE 99 11/02/2018    CO2 24 11/02/2018    GLUCOSE 105 (H) 11/02/2018    BUN 25 (H) 11/02/2018    CREATININE 1.04 11/02/2018        Lab Results   Component Value Date    WBC 11.0 (H) 11/02/2018    HEMOGLOBIN 14.4 11/02/2018    HEMATOCRIT 44.9 11/02/2018    PLATELETCT 288 11/02/2018         Total time of the discharge process exceeds 41 minutes.

## 2018-11-02 NOTE — CARE PLAN
Problem: Safety  Goal: Will remain free from falls    Intervention: Implement fall precautions   11/02/18 1219   OTHER   Environmental Precautions Treaded Slipper Socks on Patient;Personal Belongings, Wastebasket, Call Bell etc. in Easy Reach;Transferred to Stronger Side;Communication Sign for Patients & Families;Bed in Low Position;Report Given to Other Health Care Providers Regarding Fall Risk;Mobility Assessed & Appropriate Sign Placed         Problem: Pain Management  Goal: Pain level will decrease to patient's comfort goal    Intervention: Follow pain managment plan developed in collaboration with patient and Interdisciplinary Team  RN will assess patient's pain level and implement pain strategies according to MAR. Rn will also educate patient regarding both nonpharmacologic and pharmacologic pain strategies.

## 2018-11-03 ENCOUNTER — APPOINTMENT (OUTPATIENT)
Dept: RADIOLOGY | Facility: MEDICAL CENTER | Age: 58
DRG: 175 | End: 2018-11-03
Attending: HOSPITALIST
Payer: COMMERCIAL

## 2018-11-03 VITALS
BODY MASS INDEX: 32.89 KG/M2 | SYSTOLIC BLOOD PRESSURE: 134 MMHG | TEMPERATURE: 98.9 F | OXYGEN SATURATION: 96 % | RESPIRATION RATE: 18 BRPM | HEART RATE: 75 BPM | DIASTOLIC BLOOD PRESSURE: 93 MMHG | WEIGHT: 264.55 LBS | HEIGHT: 75 IN

## 2018-11-03 PROCEDURE — 99239 HOSP IP/OBS DSCHRG MGMT >30: CPT | Performed by: INTERNAL MEDICINE

## 2018-11-03 PROCEDURE — 93970 EXTREMITY STUDY: CPT

## 2018-11-03 PROCEDURE — A9270 NON-COVERED ITEM OR SERVICE: HCPCS | Performed by: INTERNAL MEDICINE

## 2018-11-03 PROCEDURE — 700112 HCHG RX REV CODE 229: Performed by: HOSPITALIST

## 2018-11-03 PROCEDURE — A9270 NON-COVERED ITEM OR SERVICE: HCPCS | Performed by: HOSPITALIST

## 2018-11-03 PROCEDURE — 700102 HCHG RX REV CODE 250 W/ 637 OVERRIDE(OP): Performed by: INTERNAL MEDICINE

## 2018-11-03 PROCEDURE — 700102 HCHG RX REV CODE 250 W/ 637 OVERRIDE(OP): Performed by: HOSPITALIST

## 2018-11-03 RX ADMIN — VITAMIN D, TAB 1000IU (100/BT) 2000 UNITS: 25 TAB at 05:54

## 2018-11-03 RX ADMIN — RIVAROXABAN 15 MG: 15 TABLET, FILM COATED ORAL at 07:57

## 2018-11-03 RX ADMIN — OXYCODONE HYDROCHLORIDE 5 MG: 5 TABLET ORAL at 05:54

## 2018-11-03 RX ADMIN — DOCUSATE SODIUM 100 MG: 100 CAPSULE, LIQUID FILLED ORAL at 05:54

## 2018-11-03 RX ADMIN — ACETAMINOPHEN 650 MG: 325 TABLET, FILM COATED ORAL at 08:03

## 2018-11-03 RX ADMIN — LISINOPRIL 20 MG: 20 TABLET ORAL at 05:54

## 2018-11-03 RX ADMIN — OXYCODONE HYDROCHLORIDE 5 MG: 5 TABLET ORAL at 10:03

## 2018-11-03 ASSESSMENT — PAIN SCALES - GENERAL
PAINLEVEL_OUTOF10: 4
PAINLEVEL_OUTOF10: 3
PAINLEVEL_OUTOF10: 0
PAINLEVEL_OUTOF10: 3

## 2018-11-03 NOTE — DISCHARGE PLANNING
Followed pt this morning for discharge prescription needs. Per notes and pt, pt unable to pay $100 copay for Xarelto. A trial/discount card was given to pt's wife yesterday, however the prescription had not been filled and picked up. Contacted pt's pharmacy this morning who confirmed that they had the prescription available with the copay but could not verify if the discount card could be used. Unable to activate discount card today as it is only available M-F. Care coordination assisted with this issue and was able to obtain a 21-day supply of Xarelto for pt to take home through the healthcare pharmacy. Medication and discount card given to pt. Education provided on the 21-day supply, dosage, and the importance of following up with PCP to obtain prescription for continued therapy. Questions answered as needed. Pt and wife verbalized understanding. Bedside RN updated. Pt to discharge home today.

## 2018-11-03 NOTE — PROGRESS NOTES
Bedside shift report received. Assumed care of patient at this time. Patient sitting up in bed resting comfortably. Call light and personal items within reach. No further requests at this time.

## 2018-11-03 NOTE — DISCHARGE PLANNING
Obtained xarelto from [facility] pharmacy. Meds given to bedside RN to give to pt.

## 2018-11-03 NOTE — PROGRESS NOTES
Dr. Marie called at clarify discharge orders pending US lower extremities. MD gave clarification regarding awaiting procedure until am and discharging patient. RN called US to ensure patient is scheduled first thing in am. Family ensured patient wont D/C until results are in.

## 2018-11-03 NOTE — DISCHARGE PLANNING
Xarelto discount programs are not available on weekends (M-F only.) Unable to determine if pt qualifies for discount/free trial today. Dr. Marie has escripted RX to [facility] pharm. [facility] [employer] will pay for 21 day supply of xarelto at $7.89. Will need to f/u with pcp to get RX for continued Xarelto and apply for discount programs.

## 2018-11-03 NOTE — RESPIRATORY CARE
COPD EDUCATION by COPD CLINICAL EDUCATOR  11/3/2018 at 6:13 AM by Zaida Sandoval     Patient reviewed by COPD education team. Patient does not qualify for COPD program.

## 2018-11-03 NOTE — DISCHARGE PLANNING
Call from floor nurse stating pt's co-pay is $100 and he can't afford it. Xarelto 30 day free trial card tubed to T7 with instructions for pt to contact pharmacy to see if Rx needs prior auth. Also, instructed follow up visit with PCP.

## 2018-11-03 NOTE — DISCHARGE INSTRUCTIONS
Pulmonary Embolism  A pulmonary embolism (PE) is a sudden blockage or decrease of blood flow in one lung or both lungs. Most blockages come from a blood clot that travels from the legs or the pelvis to the lungs. PE is a dangerous and potentially life-threatening condition if it is not treated right away.  What are the causes?  A pulmonary embolism occurs most commonly when a blood clot travels from one of your veins to your lungs. Rarely, PE is caused by air, fat, amniotic fluid, or part of a tumor traveling through your veins to your lungs.  What increases the risk?  A PE is more likely to develop in:  · People who smoke.  · People who are older, especially over 60 years of age.  · People who are overweight (obese).  · People who sit or lie still for a long time, such as during long-distance travel (over 4 hours), bed rest, hospitalization, or during recovery from certain medical conditions like a stroke.  · People who do not engage in much physical activity (sedentary lifestyle).  · People who have chronic breathing disorders.  · People who have a personal or family history of blood clots or blood clotting disease.  · People who have peripheral vascular disease (PVD), diabetes, or some types of cancer.  · People who have heart disease, especially if the person had a recent heart attack or has congestive heart failure.  · People who have neurological diseases that affect the legs (leg paresis).  · People who have had a traumatic injury, such as breaking a hip or leg.  · People who have recently had major or lengthy surgery, especially on the hip, knee, or abdomen.  · People who have had a central line placed inside a large vein.  · People who take medicines that contain the hormone estrogen. These include birth control pills and hormone replacement therapy.  · Pregnancy or during childbirth or the postpartum period.  What are the signs or symptoms?  The symptoms of a PE usually start suddenly and  include:  · Shortness of breath while active or at rest.  · Coughing or coughing up blood or blood-tinged mucus.  · Chest pain that is often worse with deep breaths.  · Rapid or irregular heartbeat.  · Feeling light-headed or dizzy.  · Fainting.  · Feeling anxious.  · Sweating.  There may also be pain and swelling in a leg if that is where the blood clot started.  These symptoms may represent a serious problem that is an emergency. Do not wait to see if the symptoms will go away. Get medical help right away. Call your local emergency services (911 in the [place] Do not drive yourself to the hospital.   How is this diagnosed?  Your health care provider will take a medical history and perform a physical exam. You may also have other tests, including:  · Blood tests to assess the clotting properties of your blood, assess oxygen levels in your blood, and find blood clots.  · Imaging tests, such as CT, ultrasound, MRI, X-ray, and other tests to see if you have clots anywhere in your body.  · An electrocardiogram (ECG) to look for heart strain from blood clots in the lungs.  How is this treated?  The main goals of PE treatment are:  · To stop a blood clot from growing larger.  · To stop new blood clots from forming.  The type of treatment that you receive depends on many factors, such as the cause of your PE, your risk for bleeding or developing more clots, and other medical conditions that you have. Sometimes, a combination of treatments is necessary.  This condition may be treated with:  · Medicines, including newer oral blood thinners (anticoagulants), warfarin, low molecular weight heparins, thrombolytics, or heparins.  · Wearing compression stockings or using different types of devices.  · Surgery (rare) to remove the blood clot or to place a filter in your abdomen to stop the blood clot from traveling to your lungs.  Treatments for a PE are often divided into immediate treatment, long-term treatment (up to 3 months  after PE), and extended treatment (more than 3 months after PE). Your treatment may continue for several months. This is called maintenance therapy, and it is used to prevent the forming of new blood clots. You can work with your health care provider to choose the treatment program that is best for you.  What are anticoagulants?   Anticoagulants are medicines that treat PEs. They can stop current blood clots from growing and stop new clots from forming. They cannot dissolve existing clots. Your body dissolves clots by itself over time. Anticoagulants are given by mouth, by injection, or through an IV tube.  What are thrombolytics?   Thrombolytics are clot-dissolving medicines that are used to dissolve a PE. They carry a high risk of bleeding, so they tend to be used only in severe cases or if you have very low blood pressure.  Follow these instructions at home:  If you are taking a newer oral anticoagulant:  · Take the medicine every single day at the same time each day.  · Understand what foods and drugs interact with this medicine.  · Understand that there are no regular blood tests required when using this medicine.  · Understand the side effects of this medicine, including excessive bruising or bleeding. Ask your health care provider or pharmacist about other possible side effects.  If you are taking warfarin:  · Understand how to take warfarin and know which foods can affect how warfarin works in your body.  · Understand that it is dangerous to take too much or too little warfarin. Too much warfarin increases the risk of bleeding. Too little warfarin continues to allow the risk for blood clots.  · Follow your PT and INR blood testing schedule. The PT and INR results allow your health care provider to adjust your dose of warfarin. It is very important that you have your PT and INR tested as often as told by your health care provider.  · Avoid major changes in your diet, or tell your health care provider before  you change your diet. Arrange a visit with a registered dietitian to answer your questions. Many foods, especially foods that are high in vitamin K, can interfere with warfarin and affect the PT and INR results. Eat a consistent amount of foods that are high in vitamin K, such as:  ¨ Spinach, kale, broccoli, cabbage, [place] greens, turnip greens, [place] sprouts, peas, cauliflower, seaweed, and parsley.  ¨ Beef liver and pork liver.  ¨ Green tea.  ¨ Soybean oil.  · Tell your health care provider about any and all medicines, vitamins, and supplements that you take, including aspirin and other over-the-counter anti-inflammatory medicines. Be especially cautious with aspirin and anti-inflammatory medicines. Do not take those before you ask your health care provider if it is safe to do so. This is important because many medicines can interfere with warfarin and affect the PT and INR results.  · Do not start or stop taking any over-the-counter or prescription medicine unless your health care provider or pharmacist tells you to do so.  If you take warfarin, you will also need to do these things:  · Hold pressure over cuts for longer than usual.  · Tell your dentist and other health care providers that you are taking warfarin before you have any procedures in which bleeding may occur.  · Avoid alcohol or drink very small amounts. Tell your health care provider if you change your alcohol intake.  · Do not use tobacco products, including cigarettes, chewing tobacco, and e-cigarettes. If you need help quitting, ask your health care provider.  · Avoid contact sports.  General instructions  · Take over-the-counter and prescription medicines only as told by your health care provider. Anticoagulant medicines can have side effects, including easy bruising and difficulty stopping bleeding. If you are prescribed an anticoagulant, you will also need to do these things:  ¨ Hold pressure over cuts for longer than usual.  ¨ Tell your  dentist and other health care providers that you are taking anticoagulants before you have any procedures in which bleeding may occur.  ¨ Avoid contact sports.  · Wear a medical alert bracelet or carry a medical alert card that says you have had a PE.  · Ask your health care provider how soon you can go back to your normal activities. Stay active to prevent new blood clots from forming.  · Make sure to exercise while traveling or when you have been sitting or standing for a long period of time. It is very important to exercise. Exercise your legs by walking or by tightening and relaxing your leg muscles often. Take frequent walks.  · Wear compression stockings as told by your health care provider to help prevent more blood clots from forming.  · Do not use tobacco products, including cigarettes, chewing tobacco, and e-cigarettes. If you need help quitting, ask your health care provider.  · Keep all follow-up appointments with your health care provider. This is important.  How is this prevented?  Take these actions to decrease your risk of developing another PE:  · Exercise regularly. For at least 30 minutes every day, engage in:  ¨ Activity that involves moving your arms and legs.  ¨ Activity that encourages good blood flow through your body by increasing your heart rate.  · Exercise your arms and legs every hour during long-distance travel (over 4 hours). Drink plenty of water and avoid drinking alcohol while traveling.  · Avoid sitting or lying in bed for long periods of time without moving your legs.  · Maintain a weight that is appropriate for your height. Ask your health care provider what weight is healthy for you.  · If you are a woman who is over 35 years of age, avoid unnecessary use of medicines that contain estrogen. These include birth control pills.  · Do not smoke, especially if you take estrogen medicines. If you need help quitting, ask your health care provider.  · If you are at very high risk for  PE, wear compression stockings.  · If you recently had a PE, have regularly scheduled ultrasound testing on your legs to check for new blood clots.  If you are hospitalized, prevention measures may include:  · Early walking after surgery, as soon as your health care provider says that it is safe.  · Receiving anticoagulants to prevent blood clots. If you cannot take anticoagulants, other options may be available, such as wearing compression stockings or using different types of devices.  Get help right away if:  · You have new or increased pain, swelling, or redness in an arm or leg.  · You have numbness or tingling in an arm or leg.  · You have shortness of breath while active or at rest.  · You have chest pain.  · You have a rapid or irregular heartbeat.  · You feel light-headed or dizzy.  · You cough up blood.  · You notice blood in your vomit, bowel movement, or urine.  · You have a fever.  These symptoms may represent a serious problem that is an emergency. Do not wait to see if the symptoms will go away. Get medical help right away. Call your local emergency services (911 in the [place] Do not drive yourself to the hospital.   This information is not intended to replace advice given to you by your health care provider. Make sure you discuss any questions you have with your health care provider.  Document Released: 12/15/2001 Document Revised: 05/25/2017 Document Reviewed: 04/13/2016  [employer] Patient Education © 2017 [employer]  Discharge Instructions    Discharged to home by car with relative. Discharged via wheelchair, hospital escort: Yes.  Special equipment needed: Not Applicable    Be sure to schedule a follow-up appointment with your primary care doctor or any specialists as instructed.     Discharge Plan:   Diet Plan: Discussed  Activity Level: Discussed  Confirmed Symptoms Management: Discussed  Medication Reconciliation Updated: Yes  Influenza Vaccine Indication: Patient Refuses    I  understand that a diet low in cholesterol, fat, and sodium is recommended for good health. Unless I have been given specific instructions below for another diet, I accept this instruction as my diet prescription.   Other diet: Regular    Special Instructions: None    · Is patient discharged on Warfarin / Coumadin?   No     Depression / Suicide Risk    As you are discharged from this [facility] facility, it is important to learn how to keep safe from harming yourself.    Recognize the warning signs:  · Abrupt changes in personality, positive or negative- including increase in energy   · Giving away possessions  · Change in eating patterns- significant weight changes-  positive or negative  · Change in sleeping patterns- unable to sleep or sleeping all the time   · Unwillingness or inability to communicate  · Depression  · Unusual sadness, discouragement and loneliness  · Talk of wanting to die  · Neglect of personal appearance   · Rebelliousness- reckless behavior  · Withdrawal from people/activities they love  · Confusion- inability to concentrate     If you or a loved one observes any of these behaviors or has concerns about self-harm, here's what you can do:  · Talk about it- your feelings and reasons for harming yourself  · Remove any means that you might use to hurt yourself (examples: pills, rope, extension cords, firearm)  · Get professional help from the community (Mental Health, Substance Abuse, psychological counseling)  · Do not be alone:Call your Safe Contact- someone whom you trust who will be there for you.  · Call your local CRISIS HOTLINE 478-4296 or 425-303-1657  · Call your local Children's Mobile Crisis Response Team [place] (185) 412-7805 or www.[place]  · Call the toll free National Suicide Prevention Hotlines   · National Suicide Prevention Lifeline 489-357-BERY (7352)  · National Hope Line Network 800-SUICIDE (558-5737)

## 2018-11-03 NOTE — DISCHARGE PLANNING
Anticipated Discharge Disposition: Home    Action: Called [facility] pharmacy to verify Xarelto coverage. Per pharmacy, rx went through insurance for $100 co pay. Notified [employer] who will notify pt and make sure pt is agreeable to cost.     Barriers to Discharge: None    Plan: D/c home

## 2018-11-03 NOTE — PROGRESS NOTES
Patient and wife provided with discharge instructions as well as instructions for follow up care. Patient encouraged to seek help with any return of symptoms. Patient instructed to obtain follow up appointment with PCP for xarelto management.

## 2018-11-03 NOTE — PROGRESS NOTES
"Patient states his knee is \"locked up\" and in pain and if he tries to move or reposition himself \"he is going to scream\". Called night hospitalist who orders additional one time dose of oxy IR 5mg now.   "

## 2018-11-09 ENCOUNTER — APPOINTMENT (OUTPATIENT)
Dept: RADIOLOGY | Facility: MEDICAL CENTER | Age: 58
DRG: 312 | End: 2018-11-09
Attending: EMERGENCY MEDICINE
Payer: COMMERCIAL

## 2018-11-09 ENCOUNTER — HOSPITAL ENCOUNTER (INPATIENT)
Facility: MEDICAL CENTER | Age: 58
LOS: 1 days | DRG: 312 | End: 2018-11-12
Attending: EMERGENCY MEDICINE | Admitting: INTERNAL MEDICINE
Payer: COMMERCIAL

## 2018-11-09 DIAGNOSIS — R55 SYNCOPE, UNSPECIFIED SYNCOPE TYPE: ICD-10-CM

## 2018-11-09 DIAGNOSIS — R55 CONVULSIVE SYNCOPE: ICD-10-CM

## 2018-11-09 DIAGNOSIS — N17.9 AKI (ACUTE KIDNEY INJURY) (HCC): ICD-10-CM

## 2018-11-09 DIAGNOSIS — R55 SYNCOPE AND COLLAPSE: ICD-10-CM

## 2018-11-09 DIAGNOSIS — R09.02 HYPOXIA: ICD-10-CM

## 2018-11-09 LAB
ALBUMIN SERPL BCP-MCNC: 3.9 G/DL (ref 3.2–4.9)
ALBUMIN/GLOB SERPL: 1.1 G/DL
ALP SERPL-CCNC: 71 U/L (ref 30–99)
ALT SERPL-CCNC: 27 U/L (ref 2–50)
ANION GAP SERPL CALC-SCNC: 10 MMOL/L (ref 0–11.9)
AST SERPL-CCNC: 22 U/L (ref 12–45)
BASOPHILS # BLD AUTO: 0.3 % (ref 0–1.8)
BASOPHILS # BLD: 0.05 K/UL (ref 0–0.12)
BILIRUB SERPL-MCNC: 0.7 MG/DL (ref 0.1–1.5)
BUN SERPL-MCNC: 29 MG/DL (ref 8–22)
CALCIUM SERPL-MCNC: 9.8 MG/DL (ref 8.5–10.5)
CHLORIDE SERPL-SCNC: 94 MMOL/L (ref 96–112)
CO2 SERPL-SCNC: 27 MMOL/L (ref 20–33)
CREAT SERPL-MCNC: 1.49 MG/DL (ref 0.5–1.4)
EKG IMPRESSION: NORMAL
EOSINOPHIL # BLD AUTO: 0.02 K/UL (ref 0–0.51)
EOSINOPHIL NFR BLD: 0.1 % (ref 0–6.9)
ERYTHROCYTE [DISTWIDTH] IN BLOOD BY AUTOMATED COUNT: 42.5 FL (ref 35.9–50)
GLOBULIN SER CALC-MCNC: 3.6 G/DL (ref 1.9–3.5)
GLUCOSE SERPL-MCNC: 119 MG/DL (ref 65–99)
HCT VFR BLD AUTO: 45.5 % (ref 42–52)
HGB BLD-MCNC: 15.1 G/DL (ref 14–18)
IMM GRANULOCYTES # BLD AUTO: 0.19 K/UL (ref 0–0.11)
IMM GRANULOCYTES NFR BLD AUTO: 1.2 % (ref 0–0.9)
LYMPHOCYTES # BLD AUTO: 0.86 K/UL (ref 1–4.8)
LYMPHOCYTES NFR BLD: 5.2 % (ref 22–41)
MCH RBC QN AUTO: 28.2 PG (ref 27–33)
MCHC RBC AUTO-ENTMCNC: 33.2 G/DL (ref 33.7–35.3)
MCV RBC AUTO: 84.9 FL (ref 81.4–97.8)
MONOCYTES # BLD AUTO: 0.78 K/UL (ref 0–0.85)
MONOCYTES NFR BLD AUTO: 4.7 % (ref 0–13.4)
NEUTROPHILS # BLD AUTO: 14.53 K/UL (ref 1.82–7.42)
NEUTROPHILS NFR BLD: 88.5 % (ref 44–72)
NRBC # BLD AUTO: 0 K/UL
NRBC BLD-RTO: 0 /100 WBC
PLATELET # BLD AUTO: 524 K/UL (ref 164–446)
PMV BLD AUTO: 10.3 FL (ref 9–12.9)
POTASSIUM SERPL-SCNC: 4.6 MMOL/L (ref 3.6–5.5)
PROT SERPL-MCNC: 7.5 G/DL (ref 6–8.2)
RBC # BLD AUTO: 5.36 M/UL (ref 4.7–6.1)
SODIUM SERPL-SCNC: 131 MMOL/L (ref 135–145)
TROPONIN I SERPL-MCNC: <0.01 NG/ML (ref 0–0.04)
WBC # BLD AUTO: 16.4 K/UL (ref 4.8–10.8)

## 2018-11-09 PROCEDURE — 83880 ASSAY OF NATRIURETIC PEPTIDE: CPT

## 2018-11-09 PROCEDURE — 80053 COMPREHEN METABOLIC PANEL: CPT

## 2018-11-09 PROCEDURE — 85025 COMPLETE CBC W/AUTO DIFF WBC: CPT

## 2018-11-09 PROCEDURE — 84484 ASSAY OF TROPONIN QUANT: CPT

## 2018-11-09 PROCEDURE — 99285 EMERGENCY DEPT VISIT HI MDM: CPT

## 2018-11-09 PROCEDURE — 700105 HCHG RX REV CODE 258: Performed by: EMERGENCY MEDICINE

## 2018-11-09 PROCEDURE — 93005 ELECTROCARDIOGRAM TRACING: CPT

## 2018-11-09 PROCEDURE — 70450 CT HEAD/BRAIN W/O DYE: CPT

## 2018-11-09 PROCEDURE — 93005 ELECTROCARDIOGRAM TRACING: CPT | Performed by: EMERGENCY MEDICINE

## 2018-11-09 PROCEDURE — 304561 HCHG STAT O2

## 2018-11-09 RX ORDER — SODIUM CHLORIDE 9 MG/ML
1000 INJECTION, SOLUTION INTRAVENOUS ONCE
Status: COMPLETED | OUTPATIENT
Start: 2018-11-09 | End: 2018-11-09

## 2018-11-09 RX ADMIN — SODIUM CHLORIDE 1000 ML: 9 INJECTION, SOLUTION INTRAVENOUS at 22:35

## 2018-11-09 ASSESSMENT — PAIN SCALES - GENERAL
PAINLEVEL_OUTOF10: 0
PAINLEVEL_OUTOF10: 0

## 2018-11-10 ENCOUNTER — APPOINTMENT (OUTPATIENT)
Dept: CARDIOLOGY | Facility: MEDICAL CENTER | Age: 58
DRG: 312 | End: 2018-11-10
Attending: STUDENT IN AN ORGANIZED HEALTH CARE EDUCATION/TRAINING PROGRAM
Payer: COMMERCIAL

## 2018-11-10 ENCOUNTER — PATIENT OUTREACH (OUTPATIENT)
Dept: HEALTH INFORMATION MANAGEMENT | Facility: OTHER | Age: 58
End: 2018-11-10

## 2018-11-10 ENCOUNTER — APPOINTMENT (OUTPATIENT)
Dept: RADIOLOGY | Facility: MEDICAL CENTER | Age: 58
DRG: 312 | End: 2018-11-10
Attending: STUDENT IN AN ORGANIZED HEALTH CARE EDUCATION/TRAINING PROGRAM
Payer: COMMERCIAL

## 2018-11-10 PROBLEM — E11.9 DIABETES (HCC): Status: ACTIVE | Noted: 2018-11-10

## 2018-11-10 LAB
APPEARANCE UR: CLEAR
BILIRUB UR QL STRIP.AUTO: NEGATIVE
BNP SERPL-MCNC: <2 PG/ML (ref 0–100)
COLOR UR: YELLOW
GLUCOSE UR STRIP.AUTO-MCNC: NEGATIVE MG/DL
KETONES UR STRIP.AUTO-MCNC: 15 MG/DL
LEUKOCYTE ESTERASE UR QL STRIP.AUTO: NEGATIVE
LV EJECT FRACT  99904: 65
LV EJECT FRACT MOD 2C 99903: 59.71
LV EJECT FRACT MOD 4C 99902: 68.26
LV EJECT FRACT MOD BP 99901: 64.48
MAGNESIUM SERPL-MCNC: 2 MG/DL (ref 1.5–2.5)
MICRO URNS: ABNORMAL
NITRITE UR QL STRIP.AUTO: NEGATIVE
PH UR STRIP.AUTO: 5 [PH]
PROT UR QL STRIP: NEGATIVE MG/DL
RBC UR QL AUTO: NEGATIVE
SP GR UR STRIP.AUTO: 1.02
TSH SERPL DL<=0.005 MIU/L-ACNC: 0.9 UIU/ML (ref 0.38–5.33)
UROBILINOGEN UR STRIP.AUTO-MCNC: 1 MG/DL

## 2018-11-10 PROCEDURE — 83735 ASSAY OF MAGNESIUM: CPT

## 2018-11-10 PROCEDURE — 700102 HCHG RX REV CODE 250 W/ 637 OVERRIDE(OP): Performed by: PHYSICAL MEDICINE & REHABILITATION

## 2018-11-10 PROCEDURE — 84443 ASSAY THYROID STIM HORMONE: CPT

## 2018-11-10 PROCEDURE — 700102 HCHG RX REV CODE 250 W/ 637 OVERRIDE(OP): Performed by: STUDENT IN AN ORGANIZED HEALTH CARE EDUCATION/TRAINING PROGRAM

## 2018-11-10 PROCEDURE — A9270 NON-COVERED ITEM OR SERVICE: HCPCS | Performed by: STUDENT IN AN ORGANIZED HEALTH CARE EDUCATION/TRAINING PROGRAM

## 2018-11-10 PROCEDURE — 81003 URINALYSIS AUTO W/O SCOPE: CPT

## 2018-11-10 PROCEDURE — 93306 TTE W/DOPPLER COMPLETE: CPT | Mod: 26 | Performed by: INTERNAL MEDICINE

## 2018-11-10 PROCEDURE — G0378 HOSPITAL OBSERVATION PER HR: HCPCS

## 2018-11-10 PROCEDURE — 700105 HCHG RX REV CODE 258: Performed by: PHYSICAL MEDICINE & REHABILITATION

## 2018-11-10 PROCEDURE — 93306 TTE W/DOPPLER COMPLETE: CPT

## 2018-11-10 PROCEDURE — 70551 MRI BRAIN STEM W/O DYE: CPT

## 2018-11-10 PROCEDURE — A9270 NON-COVERED ITEM OR SERVICE: HCPCS | Performed by: PHYSICAL MEDICINE & REHABILITATION

## 2018-11-10 PROCEDURE — 93880 EXTRACRANIAL BILAT STUDY: CPT

## 2018-11-10 PROCEDURE — 36415 COLL VENOUS BLD VENIPUNCTURE: CPT

## 2018-11-10 PROCEDURE — 99220 PR INITIAL OBSERVATION CARE,LEVL III: CPT | Mod: GC | Performed by: INTERNAL MEDICINE

## 2018-11-10 PROCEDURE — 700117 HCHG RX CONTRAST REV CODE 255: Performed by: STUDENT IN AN ORGANIZED HEALTH CARE EDUCATION/TRAINING PROGRAM

## 2018-11-10 RX ORDER — LISINOPRIL AND HYDROCHLOROTHIAZIDE 20; 12.5 MG/1; MG/1
1 TABLET ORAL DAILY
Status: DISCONTINUED | OUTPATIENT
Start: 2018-11-10 | End: 2018-11-10

## 2018-11-10 RX ORDER — BISACODYL 10 MG
10 SUPPOSITORY, RECTAL RECTAL
Status: DISCONTINUED | OUTPATIENT
Start: 2018-11-10 | End: 2018-11-12 | Stop reason: HOSPADM

## 2018-11-10 RX ORDER — AMOXICILLIN 250 MG
2 CAPSULE ORAL 2 TIMES DAILY
Status: DISCONTINUED | OUTPATIENT
Start: 2018-11-10 | End: 2018-11-12 | Stop reason: HOSPADM

## 2018-11-10 RX ORDER — PROMETHAZINE HYDROCHLORIDE 25 MG/1
12.5-25 TABLET ORAL EVERY 4 HOURS PRN
Status: DISCONTINUED | OUTPATIENT
Start: 2018-11-10 | End: 2018-11-12 | Stop reason: HOSPADM

## 2018-11-10 RX ORDER — PSEUDOEPHEDRINE HCL 30 MG
100 TABLET ORAL 2 TIMES DAILY
Status: DISCONTINUED | OUTPATIENT
Start: 2018-11-10 | End: 2018-11-10

## 2018-11-10 RX ORDER — HYDROCHLOROTHIAZIDE 25 MG/1
12.5 TABLET ORAL
Status: DISCONTINUED | OUTPATIENT
Start: 2018-11-10 | End: 2018-11-12

## 2018-11-10 RX ORDER — ONDANSETRON 2 MG/ML
4 INJECTION INTRAMUSCULAR; INTRAVENOUS EVERY 4 HOURS PRN
Status: DISCONTINUED | OUTPATIENT
Start: 2018-11-10 | End: 2018-11-12 | Stop reason: HOSPADM

## 2018-11-10 RX ORDER — POLYETHYLENE GLYCOL 3350 17 G/17G
1 POWDER, FOR SOLUTION ORAL
Status: DISCONTINUED | OUTPATIENT
Start: 2018-11-10 | End: 2018-11-12 | Stop reason: HOSPADM

## 2018-11-10 RX ORDER — DEXTROSE MONOHYDRATE 25 G/50ML
25 INJECTION, SOLUTION INTRAVENOUS
Status: DISCONTINUED | OUTPATIENT
Start: 2018-11-10 | End: 2018-11-10

## 2018-11-10 RX ORDER — SODIUM CHLORIDE 9 MG/ML
INJECTION, SOLUTION INTRAVENOUS CONTINUOUS
Status: DISCONTINUED | OUTPATIENT
Start: 2018-11-10 | End: 2018-11-12 | Stop reason: HOSPADM

## 2018-11-10 RX ORDER — ACETAMINOPHEN 325 MG/1
650 TABLET ORAL 4 TIMES DAILY
Status: DISCONTINUED | OUTPATIENT
Start: 2018-11-10 | End: 2018-11-12 | Stop reason: HOSPADM

## 2018-11-10 RX ORDER — ACETAMINOPHEN 325 MG/1
650 TABLET ORAL EVERY 6 HOURS PRN
Status: DISCONTINUED | OUTPATIENT
Start: 2018-11-10 | End: 2018-11-10

## 2018-11-10 RX ORDER — OXYCODONE HYDROCHLORIDE 5 MG/1
5 TABLET ORAL EVERY 6 HOURS PRN
Status: DISCONTINUED | OUTPATIENT
Start: 2018-11-10 | End: 2018-11-12 | Stop reason: HOSPADM

## 2018-11-10 RX ORDER — GUAIFENESIN/DEXTROMETHORPHAN 100-10MG/5
10 SYRUP ORAL EVERY 6 HOURS PRN
Status: DISCONTINUED | OUTPATIENT
Start: 2018-11-10 | End: 2018-11-12 | Stop reason: HOSPADM

## 2018-11-10 RX ORDER — DOCUSATE SODIUM 100 MG/1
100 CAPSULE, LIQUID FILLED ORAL
COMMUNITY
End: 2020-08-03

## 2018-11-10 RX ORDER — INSULIN GLARGINE 100 [IU]/ML
0.2 INJECTION, SOLUTION SUBCUTANEOUS EVERY EVENING
Status: DISCONTINUED | OUTPATIENT
Start: 2018-11-10 | End: 2018-11-10

## 2018-11-10 RX ORDER — PROMETHAZINE HYDROCHLORIDE 12.5 MG/1
12.5-25 SUPPOSITORY RECTAL EVERY 4 HOURS PRN
Status: DISCONTINUED | OUTPATIENT
Start: 2018-11-10 | End: 2018-11-12 | Stop reason: HOSPADM

## 2018-11-10 RX ORDER — ENALAPRILAT 1.25 MG/ML
1.25 INJECTION INTRAVENOUS EVERY 6 HOURS PRN
Status: DISCONTINUED | OUTPATIENT
Start: 2018-11-10 | End: 2018-11-10

## 2018-11-10 RX ORDER — LISINOPRIL 20 MG/1
20 TABLET ORAL
Status: DISCONTINUED | OUTPATIENT
Start: 2018-11-10 | End: 2018-11-12

## 2018-11-10 RX ORDER — ACETAMINOPHEN 500 MG
500 TABLET ORAL EVERY 6 HOURS PRN
COMMUNITY
End: 2019-03-01

## 2018-11-10 RX ORDER — OXYCODONE HYDROCHLORIDE 5 MG/1
5 TABLET ORAL EVERY 4 HOURS PRN
COMMUNITY
End: 2019-03-01

## 2018-11-10 RX ORDER — ONDANSETRON 4 MG/1
4 TABLET, ORALLY DISINTEGRATING ORAL EVERY 4 HOURS PRN
Status: DISCONTINUED | OUTPATIENT
Start: 2018-11-10 | End: 2018-11-12 | Stop reason: HOSPADM

## 2018-11-10 RX ADMIN — SODIUM CHLORIDE: 9 INJECTION, SOLUTION INTRAVENOUS at 17:59

## 2018-11-10 RX ADMIN — RIVAROXABAN 15 MG: 15 TABLET, FILM COATED ORAL at 03:28

## 2018-11-10 RX ADMIN — ACETAMINOPHEN 650 MG: 325 TABLET, FILM COATED ORAL at 17:50

## 2018-11-10 RX ADMIN — ACETAMINOPHEN 650 MG: 325 TABLET, FILM COATED ORAL at 08:50

## 2018-11-10 RX ADMIN — RIVAROXABAN 15 MG: 15 TABLET, FILM COATED ORAL at 13:15

## 2018-11-10 RX ADMIN — RIVAROXABAN 15 MG: 15 TABLET, FILM COATED ORAL at 17:52

## 2018-11-10 RX ADMIN — SODIUM CHLORIDE: 9 INJECTION, SOLUTION INTRAVENOUS at 03:11

## 2018-11-10 RX ADMIN — SODIUM CHLORIDE: 9 INJECTION, SOLUTION INTRAVENOUS at 08:48

## 2018-11-10 RX ADMIN — STANDARDIZED SENNA CONCENTRATE AND DOCUSATE SODIUM 2 TABLET: 8.6; 5 TABLET, FILM COATED ORAL at 17:50

## 2018-11-10 RX ADMIN — VITAMIN D, TAB 1000IU (100/BT) 1000 UNITS: 25 TAB at 06:11

## 2018-11-10 RX ADMIN — HUMAN ALBUMIN MICROSPHERES AND PERFLUTREN 3 ML: 10; .22 INJECTION, SOLUTION INTRAVENOUS at 17:48

## 2018-11-10 RX ADMIN — ACETAMINOPHEN 650 MG: 325 TABLET, FILM COATED ORAL at 13:15

## 2018-11-10 RX ADMIN — ACETAMINOPHEN 650 MG: 325 TABLET, FILM COATED ORAL at 20:19

## 2018-11-10 ASSESSMENT — ENCOUNTER SYMPTOMS
CONSTIPATION: 0
DIZZINESS: 0
CLAUDICATION: 0
SPUTUM PRODUCTION: 0
POLYDIPSIA: 0
BRUISES/BLEEDS EASILY: 0
DIAPHORESIS: 0
SEIZURES: 0
NECK PAIN: 0
PALPITATIONS: 0
WEAKNESS: 0
NAUSEA: 0
PND: 0
WEIGHT LOSS: 0
COUGH: 0
HEMOPTYSIS: 0
BLURRED VISION: 0
DEPRESSION: 0
CHILLS: 0
FEVER: 0
DIZZINESS: 1
SORE THROAT: 0
DIARRHEA: 0
SENSORY CHANGE: 0
VOMITING: 0
HEADACHES: 0
SPEECH CHANGE: 0
FOCAL WEAKNESS: 0
NERVOUS/ANXIOUS: 0
BLOOD IN STOOL: 0
MYALGIAS: 0
TREMORS: 0
DOUBLE VISION: 0
FLANK PAIN: 0
HEARTBURN: 0
LOSS OF CONSCIOUSNESS: 1
BACK PAIN: 0
TINGLING: 0
ABDOMINAL PAIN: 0
ORTHOPNEA: 0
FALLS: 0

## 2018-11-10 ASSESSMENT — COPD QUESTIONNAIRES
COPD SCREENING SCORE: 3
HAVE YOU SMOKED AT LEAST 100 CIGARETTES IN YOUR ENTIRE LIFE: NO/DON'T KNOW
DURING THE PAST 4 WEEKS HOW MUCH DID YOU FEEL SHORT OF BREATH: SOME OF THE TIME
DO YOU EVER COUGH UP ANY MUCUS OR PHLEGM?: NO/ONLY WITH OCCASIONAL COLDS OR INFECTIONS

## 2018-11-10 ASSESSMENT — PAIN SCALES - GENERAL
PAINLEVEL_OUTOF10: 0

## 2018-11-10 ASSESSMENT — LIFESTYLE VARIABLES
SUBSTANCE_ABUSE: 0
EVER_SMOKED: NEVER
EVER_SMOKED: NEVER

## 2018-11-10 ASSESSMENT — PATIENT HEALTH QUESTIONNAIRE - PHQ9
2. FEELING DOWN, DEPRESSED, IRRITABLE, OR HOPELESS: NOT AT ALL
1. LITTLE INTEREST OR PLEASURE IN DOING THINGS: NOT AT ALL
SUM OF ALL RESPONSES TO PHQ9 QUESTIONS 1 AND 2: 0

## 2018-11-10 NOTE — ASSESSMENT & PLAN NOTE
Pt with pulmonary emboli on Xarelto has had 5 syncopal episodes in the shower while sitting down and raising his arms up since his knee replacement on 10/25.  -No postictal state, however the patient does state some associated slurring of speech after episodes of syncope.  -Vertebrobasilar insufficiency is usually associated with pre-syncope.  -Previous echo showed EF of 60, however patient has new symptoms of shortness of breath associated with syncope-warrants new echocardiogram.  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  -Flexion-extension x-ray of cervical spine normal      Patient to be discharged with follow up with neuro in 4-6 weeks.  Patient will follow up with PCP with AM cortisol.  Discontinue HCTZ outpatient

## 2018-11-10 NOTE — ED NOTES
"Pt presents with syncopal episode,recently had knee replacement surgery and on 10/31 (per pt) was found to have b/l PE's started on xarelto. Has since had multiple episodes of syncope, using O2 at home as needed \"very rarely and 2l\" today had another syncopal episode, no injury noted or reported, visitor states \"he was out and his oxygen went to 67\" pt states he wakes up and \"eventually all the symptoms go away\" denies CP/SOB, endorses dizziness prior to episode, of note happens when pt was showering only \"its usually a cold shower\". PIV from EMS, labs sent a/o, VSS on 3L O2. Dressing to right knee remains intact, no issues with post op healing per patient.   "

## 2018-11-10 NOTE — ASSESSMENT & PLAN NOTE
Discontinued HCTZ as this may be contributing to patient's syncope.   Patient will be started on Lisinopril 10 mg daily.  Follow up PCP for chronic management of blood pressure.

## 2018-11-10 NOTE — ASSESSMENT & PLAN NOTE
Patient has history of T2DM  A1C 6.7% on 10/12  Pt appears to be unaware of dx.   He drinks gallons of unsweetened tea/day. States that recently he has been taking 2 gallons of tea that is mostly water (only two tea bags) over the past few days.  Diabetes education   Diabetic diet  Continue Accue checks   Continue Hypoglycemia protocol  Patient to follow up with PCP

## 2018-11-10 NOTE — ED TRIAGE NOTES
Sumeet Godoy Porter  58 y.o. male  Chief Complaint   Patient presents with   • Syncope     Per EMS pt was showering got lightheaded and dizzy, +LOC, hx bilateral PE's post l. knee replacement. . Upon EMS arrival sp02 78% on pt's home 02. +nausea. -CP. Pt taking xarelto. AXO X4. GCS 15. -neck pain.         Pt is alert and oriented, speaking in full sentences, follows commands and responds appropriately to questions. NAD. Resp are even and unlabored.  EKG in progress.

## 2018-11-10 NOTE — ED NOTES
Family and patient updated r/t bed search and concerns about home medication, will address with MD Massey. Pt denies needs at this time, respirations even and unlabored, vss, on 2L NC

## 2018-11-10 NOTE — DISCHARGE PLANNING
Care Transition Team Assessment    Met with pt at bedside. According to pt he lives with his wife who assists him as needed. Pt said that he uses walker. Has home O2 2L during day.    No needs identified at this time. Pt confirmed his wife will provide transport at discharge.    Information Source  Orientation : Oriented x 4  Information Given By: Patient    Readmission Evaluation  Is this a readmission?: Yes - unplanned readmission  Why do you think you were readmitted?: syncope  Was an appointment arranged for you prior to discharge?: Yes, attended appointment  Were there new prescriptions you were supposed to fill after you were discharged?:  (No new meds)  Did you understand your discharge instructions?: Yes    Interdisciplinary Discharge Planning  Does Admitting Nurse Feel This Could be a Complex Discharge?: No  Primary Care Physician: Candie Cano  Lives with - Patient's Self Care Capacity: Spouse  Patient or legal guardian wants to designate a caregiver (see row info): No  Support Systems: Spouse / Significant Other  Do You Take your Prescribed Medications Regularly: Yes  Able to Return to Previous ADL's: Yes  Mobility Issues: Yes  Prior Services: None  Patient Expects to be Discharged to:: Home  Assistance Needed: Yes  Durable Medical Equipment: Walker Home O2 provided by [facility]    Discharge Preparedness  What are your discharge supports?: Spouse  Prior Functional Level: Ambulatory, Independent with Activities of Daily Living, Independent with Medication Management, Uses Walker  Difficulity with ADLs: Walking  Difficulity with IADLs: Cooking, Laundry, Shopping    Functional Assesment  Prior Functional Level: Ambulatory, Independent with Activities of Daily Living, Independent with Medication Management, Uses Walker    Finances  Prescription Coverage: Yes    Discharge Risks or Barriers  Discharge risks or barriers?: No    Anticipated Discharge Information  Anticipated discharge disposition: Home  Discharge  Address:  [address]  Discharge Contact Phone Number: pt [phone]

## 2018-11-10 NOTE — ED PROVIDER NOTES
ED Provider Note    Scribed for Dex Massey M.D. by Tonya Geronimo. 11/9/2018, 9:59 PM.    Primary care provider: Candie Cano M.D.  Means of arrival: [place]  History obtained from: Patient  History limited by: none    CHIEF COMPLAINT  Chief Complaint   Patient presents with   • Syncope     Per EMS pt was showering got lightheaded and dizzy, +LOC, hx bilateral PE's post l. knee replacement. . Upon EMS arrival sp02 78% on pt's home 02. +nausea. -CP. Pt taking xarelto. AXO X4. GCS 15. -neck pain.       HPI  Sumeet Buenrostro is a 58 y.o. male who presents to the Emergency Department for evaluation of a syncopal event earlier today. He explains that he was showering and got lightheaded prior to the syncopal event. He had supplemental oxygen in place during this event and his oxygen saturation dropped to 67% prior to him beginning to convulse. He explains that his throat feels as though it is closing prior to the events and he has speech issues after the events. Patient has had five episodes in the past two weeks that have all occurred while he was showering. Patient confirms that he sits down while he showers and has been walking with a walker for assistance. He denies any associated chest pain and confirms nausea. He reports feeling generally weak at this time. Patient had a total left knee replacement on 10/25/18 and has had two pulmonary embolisms since the operation. He was discharged on 11/3/18 after being admitted. Patient is taking Xarelto at this time.     REVIEW OF SYSTEMS  See HPI for further details. All other systems are negative.     PAST MEDICAL HISTORY   has a past medical history of High cholesterol and Hypertension.    SURGICAL HISTORY   has a past surgical history that includes other orthopedic surgery ('75); knee arthroplasty total (Right, 10/25/2018); and joint injection diagnostic (Left, 10/25/2018).    SOCIAL HISTORY  Social History   Substance Use Topics   • Smoking status: Never  Smoker   • Smokeless tobacco: Current User     Types: Snuff, Chew   • Alcohol use No      History   Drug Use No       FAMILY HISTORY  None noted.    CURRENT MEDICATIONS  Reviewed.  See Encounter Summary.     ALLERGIES  No Known Allergies    PHYSICAL EXAM  VITAL SIGNS: /61   Pulse 76   Temp 36.9 °C (98.4 °F)   Resp 19   SpO2 96%   Constitutional: Alert in no apparent distress. Nasal canula in place for supplemental oxygen  HENT: No signs of trauma, Bilateral external ears normal, Nose normal.   Eyes: Pupils are equal and reactive, Conjunctiva normal, Non-icteric.   Neck: Normal range of motion, No tenderness, Supple, No stridor.   Lymphatic: No lymphadenopathy noted.   Cardiovascular: Regular rate and rhythm, no murmurs.   Thorax & Lungs: Normal breath sounds, No respiratory distress, No wheezing, No chest tenderness.   Abdomen: Bowel sounds normal, Soft, No tenderness, No masses, No pulsatile masses. No peritoneal signs.  Skin: Warm, Dry, No erythema, No rash.   Back: No bony tenderness, No CVA tenderness.   Extremities: Intact distal pulses, No edema, No tenderness, No cyanosis. Left knee has bandage in place post operatively   Musculoskeletal: Good range of motion in all major joints. No tenderness to palpation or major deformities noted.   Neurologic: Alert , Normal motor function, Normal sensory function, No focal deficits noted.   Psychiatric: Affect normal, Judgment normal, Mood normal.     DIAGNOSTIC STUDIES / PROCEDURES     LABS  Results for orders placed or performed during the hospital encounter of 11/09/18   CBC w/ Differential   Result Value Ref Range    WBC 16.4 (H) 4.8 - 10.8 K/uL    RBC 5.36 4.70 - 6.10 M/uL    Hemoglobin 15.1 14.0 - 18.0 g/dL    Hematocrit 45.5 42.0 - 52.0 %    MCV 84.9 81.4 - 97.8 fL    MCH 28.2 27.0 - 33.0 pg    MCHC 33.2 (L) 33.7 - 35.3 g/dL    RDW 42.5 35.9 - 50.0 fL    Platelet Count 524 (H) 164 - 446 K/uL    MPV 10.3 9.0 - 12.9 fL    Neutrophils-Polys 88.50 (H) 44.00  - 72.00 %    Lymphocytes 5.20 (L) 22.00 - 41.00 %    Monocytes 4.70 0.00 - 13.40 %    Eosinophils 0.10 0.00 - 6.90 %    Basophils 0.30 0.00 - 1.80 %    Immature Granulocytes 1.20 (H) 0.00 - 0.90 %    Nucleated RBC 0.00 /100 WBC    Neutrophils (Absolute) 14.53 (H) 1.82 - 7.42 K/uL    Lymphs (Absolute) 0.86 (L) 1.00 - 4.80 K/uL    Monos (Absolute) 0.78 0.00 - 0.85 K/uL    Eos (Absolute) 0.02 0.00 - 0.51 K/uL    Baso (Absolute) 0.05 0.00 - 0.12 K/uL    Immature Granulocytes (abs) 0.19 (H) 0.00 - 0.11 K/uL    NRBC (Absolute) 0.00 K/uL   Complete Metabolic Panel (CMP)   Result Value Ref Range    Sodium 131 (L) 135 - 145 mmol/L    Potassium 4.6 3.6 - 5.5 mmol/L    Chloride 94 (L) 96 - 112 mmol/L    Co2 27 20 - 33 mmol/L    Anion Gap 10.0 0.0 - 11.9    Glucose 119 (H) 65 - 99 mg/dL    Bun 29 (H) 8 - 22 mg/dL    Creatinine 1.49 (H) 0.50 - 1.40 mg/dL    Calcium 9.8 8.5 - 10.5 mg/dL    AST(SGOT) 22 12 - 45 U/L    ALT(SGPT) 27 2 - 50 U/L    Alkaline Phosphatase 71 30 - 99 U/L    Total Bilirubin 0.7 0.1 - 1.5 mg/dL    Albumin 3.9 3.2 - 4.9 g/dL    Total Protein 7.5 6.0 - 8.2 g/dL    Globulin 3.6 (H) 1.9 - 3.5 g/dL    A-G Ratio 1.1 g/dL   Troponin STAT   Result Value Ref Range    Troponin I <0.01 0.00 - 0.04 ng/mL   ESTIMATED GFR   Result Value Ref Range    GFR If  59 (A) >60 mL/min/1.73 m 2    GFR If Non  48 (A) >60 mL/min/1.73 m 2   EKG   Result Value Ref Range    Report       [facility] Emergency Dept.    Test Date:  2018  Pt Name:    SALIMA TORRES               Department: ER  MRN:        [id number]                      Room:       [facility]  Gender:     Male                         Technician: ELIZABETH  :        1960                   Requested By:ER TRIAGE PROTOCOL  Order #:    [id number]                    Reading MD:    Measurements  Intervals                                Axis  Rate:       78                           P:          91  CO:         124                           QRS:        64  QRSD:       100                          T:          17  QT:         392  QTc:        447    Interpretive Statements  SINUS RHYTHM  LATERAL INFARCT, OLD  BASELINE WANDER IN LEAD(S) V3  Compared to ECG 2018 09:54:34  Myocardial infarct finding now present     EKG   Result Value Ref Range    Report       [facility] Emergency Dept.    Test Date:  2018  Pt Name:    SALIMA TORRES               Department: ER  MRN:        [id number]                      Room:       [facility]  Gender:     Male                         Technician: [id number]  :        1960                   Requested By:KIERAN WRAY  Order #:    [id number]                    Reading MD:    Measurements  Intervals                                Axis  Rate:       76                           P:          55  MN:         144                          QRS:        45  QRSD:       96                           T:          31  QT:         428  QTc:        482    Interpretive Statements  SINUS RHYTHM  BORDERLINE PROLONGED QT INTERVAL  BASELINE WANDER IN LEAD(S) V3  Compared to ECG 2018 20:30:54  Myocardial infarct finding no longer present         All labs were reviewed by me.    EKG  12 Lead EKG interpreted by me to show:  Time: 20:20 PM  Sinus rhythm  Rate 78  Persistent S1 and inverted T waves in lead 3  Axis: normal  No acute ST-T wave changes    Repeat: 12 Lead EKG interpreted by me to show:  Time 22:33 PM  Sinus rhythm  Rate 76  MN interval 144  QTc interval 482  Persistent nonspecific ST changes  Axis: Normal  No acute ST-T wave changes  No change from prior EKG        RADIOLOGY  CT-HEAD W/O   Final Result         1. No acute intracranial abnormality. No evidence of acute intracranial hemorrhage or mass lesion.                 The radiologist's interpretation of all radiological studies and images have been reviewed by me.    COURSE & MEDICAL DECISION MAKING  Pertinent Labs & Imaging studies  reviewed. (See chart for details)      9:59 PM - Patient seen and examined at bedside. Ordered EKG, CT-head, CBC, CMP, troponin, estimated GFR to evaluate his symptoms. Explained that he will need a CT scan to further evaluate his symptoms. Patient understands and agrees.    10:13 PM Patient reevaluated at bedside. Discussed lab results from his last admission which explain that he may be having an infectious response due to an increased white blood cell count. His kidney function show that he may be dehydrated and that his heart function is normal. Discussed possible causes of his symptoms and plan of care including IV fluids for dehydration, repeat EKG, and further lab work. Patient understands and agrees.     11:46 PM Paged hospitalist.    11:59 PM Spoke to Dr. Fabian (hospitalist) who will not admit the patient and suggests they are admitted to [facility].    12:12 AM Paged [facility].    12:24 AM Spoke to [facility] who agrees to admit the patient.       Decision Making:  This is a 58 y.o. year old male who presents with recurrent syncope.  By history it sounds as if the patient had a convulsive syncope.  Patient has had a positive response to IV fluids.  P.o. fluids have been withheld given his history of syncope and initial concern for possible central causation in the differential however primary concern is that he continues to have complications related to his postoperative pulmonary embolus despite anticoagulation.  It appears that the patient has had multiple syncopal episodes all of which have been related to showering episodes.  It is unclear the exact causation here although peripheral vasodilation with the heat or humidified air in light of his pulmonary embolus may be a few considerations.  His EKG as noted above is not normal however unchanged from prior.  No evidence of acute new strain.  The patient did have an echocardiogram during his most recent admission which  was reviewed by myself and more or less normal.  I do believe the patient may benefit for ongoing further evaluation from cardiology perspective and will also need ongoing cardiac telemetry to evaluate his syncope.  Again underlying all this I think that his pulmonary embolus is the likely etiology of persistent symptoms.    DISPOSITION:  Patient will be admitted to [facility] in guarded condition.    FINAL IMPRESSION  1. Syncope, unspecified syncope type          I, oTnya Geronimo (Scribe), am scribing for, and in the presence of, Dex Massey M.D..    Electronically signed by: Tonya Geronimo (Scribe), 11/9/2018    I, Dex Massey M.D. personally performed the services described in this documentation, as scribed by Tonya Geronimo in my presence, and it is both accurate and complete. C    The note accurately reflects work and decisions made by me.  Dex Massey  11/10/2018  11:14 PM

## 2018-11-10 NOTE — PROGRESS NOTES
Med Rec completed per patient  Allergies reviewed  No ORAL antibiotics in last 30 days    21 day course of 15 mg Xarelto started 11-1-18

## 2018-11-10 NOTE — ASSESSMENT & PLAN NOTE
Patient came to ED and was diagnosed with PE by CT-PE on November 1st. Had recent orthopedic surgery for right knee replacement on 10/25th.   Patient states he has been taking Xarelto 15mg BID.  Continue Xarelto 15 mg BID

## 2018-11-10 NOTE — SENIOR ADMIT NOTE
Senior Admit Note    Patient: Sumeet Buenrostro.  MRN: [id number].                               Chief complaint: syncope    HPI:  In brief, Mr Porter is a 58 y M w/ PMHx pulmonary embolisms on xarelto, exertional hypoxia on 2L O2, HTN, presenting for syncope. Patient has had multiple syncopal events the past few weeks. He was diagnosed with pulmonary emboli and started on anticoagulation. He reports compliance. Patient was in the shower today when he felt lightheaded and warm, then passed out in the shower for a few seconds. He was simply washing his hair, he sits in a shower chair and was supervised by his wife. He did not hit his head or have any trauma. Denies post syncopal confusion, tongue biting, or incontinence. He states he was wearing oxygen when syncopal event occurred.     Objective:  Pertinent vitals/physical findings: VSS  Gen.: lying in bed, NAD.  HEENT: Normocephalic, nontraumatic.  MMM.  No icterus.   CV: RRR, No m/r/g.  No JVD.  No carotid bruits.  Lungs: CTAB.  No crackles, wheezing.    Abd: soft, NT/ND, no rebound, bowel sounds present.   Extremities: No lower extremity edema. Distal pulses intact.  Neuro: Alert and oriented to person, place, time, situation.  CNs intact, strength and sensation intact throughout.  Skin: warm, dry.   Psych: appropriate mood, affect.      Labs:  WBC 16.4, Na 131, BUN/Cr 29/1.49, troponin <0.01, BNP <2     Imaging:  CT head without acute process.    Assessment/plan for main hospital problem:    # Syncope  # Pulmonary embolism  # MOISES  # Leukocytosis  # HTN    Syncope possibly vasovagal? Given presyncopal prodrome and short duration. Syncopal events always occur in the shower, due to heat?  Differential also extends to seizure vs hypoxia vs arrhythmia vs cardiac.  Monitor on telemetry, echocardiogram 11/1/18 unremarkable.  CT head normal.  Continue xarelto for PE, O2 therapy.  Continue home HTN meds.  Unclear etiology of MOISES, pt appears euvolemic. UA ordered.      DVT  prophylaxis: xarelto  Code status: FULL    For complete details, please refer to H&P by intern, Dr. Poole.     Quinten Multani M.D.

## 2018-11-10 NOTE — PROGRESS NOTES
Internal Medicine Interval Note  Note Author: Sean Rivero D.O.     Name Sumeet Buenrostro     1960   Age/Sex 58 y.o. male   MRN [id number]   Code Status Full     After 5PM or if no immediate response to page, please call for cross-coverage  Attending/Team: Dr. Enriquez/Juan See Patient List for primary contact information  Call [phone] to page    1st Call - Day Intern (R1):   Dr. Rivero 2nd Call - Day Sr. Resident (R2/R3):   Dr. Sesay       Reason for interval visit  (Principal Problem)   Syncope      Interval Problem Daily Status Update  (24 hours, problem oriented, brief subjective history, new lab/imaging data pertinent to that problem)     No acute events overnight. The patient reports that when he experienced the syncope, his O2 saturation decreased to 67%. Last syncopal episode per wife at bedside lasted approximately 30 seconds. 4 out of the 5 times the patient was sitting down.    We will order MRI, EEG, and consult Neurology.     Patient missed last night's dose of Xarelto. Will order additional dose of xarelto this morning.    Orthostatic vitals not performed in the ED. They have been reordered.    Review of Systems   Constitutional: Negative for chills and fever.   HENT: Negative for hearing loss and tinnitus.    Eyes: Negative for blurred vision and double vision.   Respiratory: Negative for cough and hemoptysis.    Cardiovascular: Negative for chest pain and palpitations.   Gastrointestinal: Negative for abdominal pain, nausea and vomiting.   Genitourinary: Negative for dysuria and frequency.   Musculoskeletal: Negative for falls, myalgias and neck pain.   Skin: Negative for itching and rash.   Neurological: Negative for dizziness, tremors, sensory change, seizures and headaches.   Endo/Heme/Allergies: Does not bruise/bleed easily.   Psychiatric/Behavioral: Negative for depression and suicidal ideas.       Disposition/Barriers to discharge:   None      Consultants/Specialty  None  PCP: Candie Cano M.D.      Quality Measures  Quality-Core Measures   Reviewed items::  Labs reviewed and Medications reviewed  Salazar catheter::  No Salazar  DVT Prophylaxis is Xarelto at this time        Physical Exam       Vitals:    11/10/18 0642 11/10/18 0644 11/10/18 0852 11/10/18 0924   BP: 104/61 116/65 111/67    Pulse: 78 87 67 68   Resp: 20 16 18 16   Temp:   36.6 °C (97.8 °F)    SpO2: 97% 97% 98% 98%   Weight:       Height:         Body mass index is 34.5 kg/m². Weight: (!) 125.2 kg (276 lb)  Oxygen Therapy:  Pulse Oximetry: 98 %, O2 (LPM): 2, O2 Delivery: Nasal Cannula    Physical Exam   Constitutional: He is oriented to person, place, and time and well-developed, well-nourished, and in no distress. No distress.   HENT:   Head: Normocephalic and atraumatic.   Eyes: Pupils are equal, round, and reactive to light. EOM are normal.   Neck: Neck supple. No thyromegaly present.   Cardiovascular: Normal rate, regular rhythm and normal heart sounds.  Exam reveals no gallop and no friction rub.    No murmur heard.  Pulmonary/Chest: Effort normal and breath sounds normal. No respiratory distress. He has no wheezes.   Abdominal: Soft. Bowel sounds are normal. He exhibits no distension. There is no tenderness.   Musculoskeletal: Normal range of motion. He exhibits no edema or deformity.   Neurological: He is alert and oriented to person, place, and time. No cranial nerve deficit. Coordination normal.   Skin: Skin is warm. No rash noted. He is not diaphoretic. No erythema.   Psychiatric: Mood, memory, affect and judgment normal.   Nursing note and vitals reviewed.            Assessment/Plan     * Syncope- (present on admission)   Assessment & Plan    Pt with pulmonary emboli on Xarelto has had 5 syncopal episodes in the shower while sitting down and raising his arms up since his knee replacement on 10/25.  -No postictal state, however the patient does state some associated slurring of  speech after episodes of syncope.  -Vertebrobasilar insufficiency is usually associated with pre-syncope.  -Previous echo showed EF of 60, however patient has new symptoms of shortness of breath associated with syncope-warrants new echocardiogram.    Plan  -Echocardiogram  -MRI of brain   -EEG  -Neurology Consult     Pulmonary embolism (HCC)- (present on admission)   Assessment & Plan    Patient came to ED and was diagnosed with PE by CT-PE on November 1st. Had recent orthopedic surgery for right knee replacement on 10/25th.   Patient states he has been taking Xarelto 15mg BID.       Diabetes (HCC)- (present on admission)   Assessment & Plan    Patient has history of T2DM  A1C 6.7% on 10/12  Pt appears to be unaware of dx.   He drinks gallons of unsweetened tea/day. States that recently he has been taking 2 gallons of tea that is mostly water (only two tea bags) over the past few days.  -Diabetes education   -Diabetic diet  -Continue Accue checks   -Continue Hypoglycemia protocol     HTN (hypertension)- (present on admission)   Assessment & Plan    Continue lisinopril/HCTZ       * Syncope- (present on admission)   Assessment & Plan    Pt w/ pulmonary emboli on Xarelto has had 5 syncopal episodes in the shower while sitting down and raising his arms up since his knee replacement on 10/25. Episodes appear to be vasovagal in nature; always the same stimulus and associated presyncopal sx. No post-ictal state/incontinence/tongue biting. Per wife, pt turns gray and mucous membranes turn blue.   -Anticoagulation failure?  -Vertebrobasilar or carotid insufficiency?Would expect other accompanying sx for vertobrobasilar. Consider angiogram or carotid US  -Consider MRI of head  -Repeat CT-CTA PE?     Pulmonary embolism (HCC)- (present on admission)   Assessment & Plan    On Xarelto     Diabetes (HCC)- (present on admission)   Assessment & Plan    T2DM  A1C 6.7% on 10/12  Pt appears to be unaware of dx.   He drinks gallons of  unsweetened tea/day  -Diabetes education   -Diabetic diet  -Accue checks   -Hypoglycemia protocol     HTN (hypertension)- (present on admission)   Assessment & Plan    On lisinopril HCTZ w/ holding parameters

## 2018-11-10 NOTE — H&P
..        Internal Medicine Admitting History and Physical    Note Author: Alba Poole M.D.       Name Sumeet Buenrostro     1960   Age/Sex 58 y.o. male   MRN [id number]   Code Status Full     After 5PM or if no immediate response to page, please call for cross-coverage  Attending/Team: Dr. Enriquez/ARRON Martinez See Patient List for primary contact information  Call [phone] to page    1st Call - Day Intern (R1):   Dr. Rivero 2nd Call - Day Sr. Resident (R2/R3):   Dr. Sesay       Chief Complaint:   Recurrent syncopal episodes    HPI:  Sumeet Buenrostro is a 58 y.o. male who presents for his 5th syncopal episode in two weeks. Pt states that on Oct 25th he had his right knee replaced and he has been passing out recurrently since then. Pt states that he is almost always only passing out in the shower. Pt uses a shower chair and says any time he raises his arms above his head (like when he is applying shampoo) he becomes dizzy, lightheaded, feels flushed, his throat starts to close, and then he passes out for 3-8 seconds at a time. Per pt's wife who has witnessed these syncopal episodes, the pt slurs his speech for a few minutes after the attacks but he is not confused and his vitals are normal. Pt appears gray in color, lips turn blue, and eyes are motionless. On  pt was found to have pulmonary emboli and was started on Xarelto. He has been taking it compliantly. Pt uses 2L of O2 at home when he is exerting himself and he has been using it in the shower. Pt denies partaking in any vigorous activity in the shower, peeing, or straining prior to these syncopal episodes. Pt says the shower water is warm and denies that it is too hot or cold. Pt said he noticed his SaO2 was 67% after his syncopal episode in the shower today.     Prior to these syncopal episodes, pt has not passed out since  when he had PNA.     Pt denies hx of strokes or mi's, weakness, numbness, tingling, HA, or vision changes.  Pt denies smoking, drinking, or drug use.     Of interesting note, pt states he drinks 2-7 gallons of tea per day and has been doing this for many years. Each gallon of tea is made with two bags of [employer] black tea. No sugar is added.     In ED, pt on home O2, 2L and satting well. HR 78. RR 18 /61.     WBC elevated, 11>>16K w/ left shift.     Hyponatremic, 133>>131  K+ WNL.   Cl trending down, 99>>94    BUN/Cr 25/1.04 GFR> 60; most current labs now showing MOISES w/ GFR 48.   Trops negative  TSH normal     CT Head: No acute processes.   EKG: HR 72, NSR; QTc 482, ST elevation <2mm. No T wave changes         Review of Systems   Constitutional: Negative for chills, diaphoresis, fever, malaise/fatigue and weight loss.   HENT: Negative for sore throat and tinnitus.    Eyes: Negative for blurred vision and double vision.   Respiratory: Negative for cough, hemoptysis and sputum production.    Cardiovascular: Negative for chest pain, palpitations, orthopnea, claudication, leg swelling and PND.   Gastrointestinal: Negative for abdominal pain, blood in stool, constipation, diarrhea, heartburn, melena, nausea and vomiting.   Genitourinary: Negative for dysuria, flank pain, frequency, hematuria and urgency.   Musculoskeletal: Negative for back pain, myalgias and neck pain.   Skin: Negative for itching and rash.   Neurological: Positive for dizziness and loss of consciousness. Negative for tingling, tremors, sensory change, speech change, focal weakness, seizures, weakness and headaches.   Endo/Heme/Allergies: Negative for environmental allergies and polydipsia. Does not bruise/bleed easily.   Psychiatric/Behavioral: Negative for substance abuse. The patient is not nervous/anxious.              Past Medical History (Chronic medical problem, known complications and current treatment)    ..  Past Medical History:   Diagnosis Date   • High cholesterol    • Hypertension          Past Surgical History:  Past Surgical History:    Procedure Laterality Date   • KNEE ARTHROPLASTY TOTAL Right 10/25/2018    Procedure: KNEE ARTHROPLASTY TOTAL-  PROCEED AS INDICATED;  Surgeon: Jeffry Christopher M.D.;  Location: SURGERY [facility];  Service: Orthopedics   • JOINT INJECTION DIAGNOSTIC Left 10/25/2018    Procedure: JOINT INJECTION DIAGNOSTIC- KNEE CORTISONE INJECTION;  Surgeon: Jeffry Christopher M.D.;  Location: SURGERY [facility];  Service: Orthopedics   • OTHER ORTHOPEDIC SURGERY  '75    norbert knee       Current Outpatient Medications:  Home Medications     Reviewed by Chuy Bruce R.N. (Registered Nurse) on 11/10/18 at 0513  Med List Status: Complete   Medication Last Dose Status   Cholecalciferol (VITAMIN D) 2000 units Cap 11/8/2018 Active   docusate sodium 100 MG Cap 3 DAYS Active   lisinopril-hydrochlorothiazide (PRINZIDE, ZESTORETIC) 20-12.5 MG per tablet 11/9/2018 Active   rivaroxaban (XARELTO) 15 MG Tab tablet 11/9/2018 Active   rivaroxaban (XARELTO) 20 MG Tab tablet  Active   testosterone cypionate (DEPO-TESTOSTERONE) 200 MG/ML Solution injection 10/30/2018 Active                Medication Allergy/Sensitivities:  No Known Allergies      Family History (mandatory)   History reviewed. No pertinent family history.    Social History (mandatory)   Social History     Social History   • Marital status:      Spouse name: N/A   • Number of children: N/A   • Years of education: N/A     Occupational History   • Not on file.     Social History Main Topics   • Smoking status: Never Smoker   • Smokeless tobacco: Current User     Types: Snuff, Chew   • Alcohol use No   • Drug use: No   • Sexual activity: Not on file     Other Topics Concern   • Not on file     Social History Narrative   • No narrative on file     Living situation: Lives with wife  PCP : Candie Cano M.D.    Physical Exam     Vitals:    11/10/18 0044 11/10/18 0100 11/10/18 0127 11/10/18 0443   BP:   113/66 (!) 99/56   Pulse: 84 76 74 74   Resp: 20 19 12  "12   Temp:   37.1 °C (98.7 °F) 36.6 °C (97.8 °F)   SpO2: 98% 100% 97% 98%   Weight:   (!) 125.2 kg (276 lb)    Height:   1.905 m (6' 3\")      Body mass index is 34.5 kg/m².  BP (!) 99/56 Comment: will notify RN  Pulse 74   Temp 36.6 °C (97.8 °F)   Resp 12   Ht 1.905 m (6' 3\")   Wt (!) 125.2 kg (276 lb)   SpO2 98%   BMI 34.50 kg/m²   O2 therapy: Pulse Oximetry: 98 %, O2 (LPM): 2, O2 Delivery: Nasal Cannula    Physical Exam   Constitutional: He is oriented to person, place, and time and well-developed, well-nourished, and in no distress. No distress.   HENT:   Head: Normocephalic and atraumatic.   Eyes: Pupils are equal, round, and reactive to light. Conjunctivae and EOM are normal. No scleral icterus.   Cardiovascular: Normal rate, regular rhythm, normal heart sounds and intact distal pulses.    No murmur heard.  Pulmonary/Chest: Effort normal and breath sounds normal. No respiratory distress. He has no wheezes. He has no rales.   Abdominal: Soft. Bowel sounds are normal. He exhibits no distension. There is no tenderness. There is no rebound and no guarding.   Genitourinary:   Genitourinary Comments: Deferred   Musculoskeletal:   Edema, right leg distal to surgical site; knee wrapped. Dressing CDI   Neurological: He is alert and oriented to person, place, and time. No cranial nerve deficit. Coordination normal. GCS score is 15.   Skin: Skin is warm and dry. No rash noted. He is not diaphoretic. No erythema. No pallor.   Psychiatric: Mood, memory, affect and judgment normal.         Data Review       Old Records Request:   Completed  Current Records review/summary: Completed    Lab Data Review:  Recent Results (from the past 24 hour(s))   EKG    Collection Time: 11/09/18  8:30 PM   Result Value Ref Range    Report       [facility] Emergency Dept.    Test Date:  2018-11-09  Pt Name:    SALIMA TORRES               Department: ER  MRN:        [id number]                      Room:       YE " 61  Gender:     Male                         Technician: [id number]  :        1960                   Requested By:ER TRIAGE PROTOCOL  Order #:    [id number]                    Reading MD:    Measurements  Intervals                                Axis  Rate:       78                           P:          91  ME:         124                          QRS:        64  QRSD:       100                          T:          17  QT:         392  QTc:        447    Interpretive Statements  SINUS RHYTHM  LATERAL INFARCT, OLD  BASELINE WANDER IN LEAD(S) V3  Compared to ECG 2018 09:54:34  Myocardial infarct finding now present     CBC w/ Differential    Collection Time: 18  8:49 PM   Result Value Ref Range    WBC 16.4 (H) 4.8 - 10.8 K/uL    RBC 5.36 4.70 - 6.10 M/uL    Hemoglobin 15.1 14.0 - 18.0 g/dL    Hematocrit 45.5 42.0 - 52.0 %    MCV 84.9 81.4 - 97.8 fL    MCH 28.2 27.0 - 33.0 pg    MCHC 33.2 (L) 33.7 - 35.3 g/dL    RDW 42.5 35.9 - 50.0 fL    Platelet Count 524 (H) 164 - 446 K/uL    MPV 10.3 9.0 - 12.9 fL    Neutrophils-Polys 88.50 (H) 44.00 - 72.00 %    Lymphocytes 5.20 (L) 22.00 - 41.00 %    Monocytes 4.70 0.00 - 13.40 %    Eosinophils 0.10 0.00 - 6.90 %    Basophils 0.30 0.00 - 1.80 %    Immature Granulocytes 1.20 (H) 0.00 - 0.90 %    Nucleated RBC 0.00 /100 WBC    Neutrophils (Absolute) 14.53 (H) 1.82 - 7.42 K/uL    Lymphs (Absolute) 0.86 (L) 1.00 - 4.80 K/uL    Monos (Absolute) 0.78 0.00 - 0.85 K/uL    Eos (Absolute) 0.02 0.00 - 0.51 K/uL    Baso (Absolute) 0.05 0.00 - 0.12 K/uL    Immature Granulocytes (abs) 0.19 (H) 0.00 - 0.11 K/uL    NRBC (Absolute) 0.00 K/uL   Complete Metabolic Panel (CMP)    Collection Time: 18  8:49 PM   Result Value Ref Range    Sodium 131 (L) 135 - 145 mmol/L    Potassium 4.6 3.6 - 5.5 mmol/L    Chloride 94 (L) 96 - 112 mmol/L    Co2 27 20 - 33 mmol/L    Anion Gap 10.0 0.0 - 11.9    Glucose 119 (H) 65 - 99 mg/dL    Bun 29 (H) 8 - 22 mg/dL    Creatinine 1.49 (H) 0.50 -  1.40 mg/dL    Calcium 9.8 8.5 - 10.5 mg/dL    AST(SGOT) 22 12 - 45 U/L    ALT(SGPT) 27 2 - 50 U/L    Alkaline Phosphatase 71 30 - 99 U/L    Total Bilirubin 0.7 0.1 - 1.5 mg/dL    Albumin 3.9 3.2 - 4.9 g/dL    Total Protein 7.5 6.0 - 8.2 g/dL    Globulin 3.6 (H) 1.9 - 3.5 g/dL    A-G Ratio 1.1 g/dL   Troponin STAT    Collection Time: 18  8:49 PM   Result Value Ref Range    Troponin I <0.01 0.00 - 0.04 ng/mL   ESTIMATED GFR    Collection Time: 18  8:49 PM   Result Value Ref Range    GFR If  59 (A) >60 mL/min/1.73 m 2    GFR If Non  48 (A) >60 mL/min/1.73 m 2   BTYPE NATRIURETIC PEPTIDE    Collection Time: 18  8:49 PM   Result Value Ref Range    B Natriuretic Peptide <2 0 - 100 pg/mL   EKG    Collection Time: 18 10:33 PM   Result Value Ref Range    Report       [facility] Emergency Dept.    Test Date:  2018  Pt Name:    SALIMA TORRES               Department: ER  MRN:        [id number]                      Room:       [facility]  Gender:     Male                         Technician: [id number]  :        1960                   Requested By:KIERAN WRAY  Order #:    [id number]                    Reading MD:    Measurements  Intervals                                Axis  Rate:       76                           P:          55  MD:         144                          QRS:        45  QRSD:       96                           T:          31  QT:         428  QTc:        482    Interpretive Statements  SINUS RHYTHM  BORDERLINE PROLONGED QT INTERVAL  BASELINE WANDER IN LEAD(S) V3  Compared to ECG 2018 20:30:54  Myocardial infarct finding no longer present     TSH (Thyroid Stimulating Hormone)    Collection Time: 11/10/18  2:20 AM   Result Value Ref Range    TSH 0.900 0.380 - 5.330 uIU/mL   Magnesium    Collection Time: 11/10/18  2:20 AM   Result Value Ref Range    Magnesium 2.0 1.5 - 2.5 mg/dL       Imaging/Procedures Review:    Independant  Imaging Review: Completed  CT-HEAD W/O   Final Result         1. No acute intracranial abnormality. No evidence of acute intracranial hemorrhage or mass lesion.                        EKG:   EKG Independant Review: Completed  EKG: HR 72, NSR; QTc 482, ST elevation <2mm. No T wave changes     Records reviewed and summarized in current documentation :  Yes  [employer] teaching service handout given to patient:  No         Assessment/Plan     * Syncope- (present on admission)   Assessment & Plan    Pt w/ pulmonary emboli on Xarelto has had 5 syncopal episodes in the shower while sitting down and raising his arms up since his knee replacement on 10/25. Episodes appear to be vasovagal in nature; always the same stimulus and associated presyncopal sx. No post-ictal state/incontinence/tongue biting. Per wife, pt turns gray and mucous membranes turn blue.   -Anticoagulation failure?  -Vertebrobasilar or carotid insufficiency?Would expect other accompanying sx for vertobrobasilar. Consider angiogram or carotid US  -Consider MRI of head  -Repeat CT-CTA PE?     Pulmonary embolism (HCC)- (present on admission)   Assessment & Plan    On Xarelto     Diabetes (HCC)- (present on admission)   Assessment & Plan    T2DM  A1C 6.7% on 10/12  Pt appears to be unaware of dx.   He drinks gallons of unsweetened tea/day  -Diabetes education   -Diabetic diet  -Accue checks   -Hypoglycemia protocol     HTN (hypertension)- (present on admission)   Assessment & Plan    On lisinopril HCTZ w/ holding parameters          Anticipated Hospital stay: Observation admit        Quality Measures  Quality-Core Measures   Reviewed items::  EKG reviewed, Labs reviewed, Medications reviewed and Radiology images reviewed  Salazar catheter::  No Salazar  DVT: Xarelto.  Ulcer Prophylaxis::  No    PCP: Candie Cano M.D.        I saw and examined the patient and discussed the management with the resident. I reviewed the resident's note and agree with the documented  findings and plan of care.    Jaci Enriquez M.D.  [facility]

## 2018-11-10 NOTE — PROGRESS NOTES
Received report from evening RN.  Pt is A/Ox4.  Respirations are even and unlabored.  Pt denies any dizziness at this time.  Monitors applied, VSS.  Will continue to closely monitor. Call light within reach.

## 2018-11-10 NOTE — PROGRESS NOTES
Transported patient on tele from ER to CDU. Patient put into room T209. VS taken and stable. Patient denies pain. No distress noted. Patient on telemetry.

## 2018-11-11 ENCOUNTER — APPOINTMENT (OUTPATIENT)
Dept: RADIOLOGY | Facility: MEDICAL CENTER | Age: 58
DRG: 312 | End: 2018-11-11
Attending: STUDENT IN AN ORGANIZED HEALTH CARE EDUCATION/TRAINING PROGRAM
Payer: COMMERCIAL

## 2018-11-11 ENCOUNTER — APPOINTMENT (OUTPATIENT)
Dept: RADIOLOGY | Facility: MEDICAL CENTER | Age: 58
DRG: 312 | End: 2018-11-11
Attending: PSYCHIATRY & NEUROLOGY
Payer: COMMERCIAL

## 2018-11-11 LAB
ALBUMIN SERPL BCP-MCNC: 3.4 G/DL (ref 3.2–4.9)
ALBUMIN/GLOB SERPL: 1.1 G/DL
ALP SERPL-CCNC: 61 U/L (ref 30–99)
ALT SERPL-CCNC: 19 U/L (ref 2–50)
ANION GAP SERPL CALC-SCNC: 5 MMOL/L (ref 0–11.9)
AST SERPL-CCNC: 14 U/L (ref 12–45)
BASOPHILS # BLD AUTO: 0.4 % (ref 0–1.8)
BASOPHILS # BLD: 0.03 K/UL (ref 0–0.12)
BILIRUB SERPL-MCNC: 0.7 MG/DL (ref 0.1–1.5)
BUN SERPL-MCNC: 22 MG/DL (ref 8–22)
CALCIUM SERPL-MCNC: 8.9 MG/DL (ref 8.5–10.5)
CHLORIDE SERPL-SCNC: 101 MMOL/L (ref 96–112)
CHOLEST SERPL-MCNC: 156 MG/DL (ref 100–199)
CO2 SERPL-SCNC: 27 MMOL/L (ref 20–33)
CREAT SERPL-MCNC: 1.09 MG/DL (ref 0.5–1.4)
EOSINOPHIL # BLD AUTO: 0.14 K/UL (ref 0–0.51)
EOSINOPHIL NFR BLD: 1.6 % (ref 0–6.9)
ERYTHROCYTE [DISTWIDTH] IN BLOOD BY AUTOMATED COUNT: 42.3 FL (ref 35.9–50)
GLOBULIN SER CALC-MCNC: 3.1 G/DL (ref 1.9–3.5)
GLUCOSE SERPL-MCNC: 95 MG/DL (ref 65–99)
HCT VFR BLD AUTO: 40.6 % (ref 42–52)
HDLC SERPL-MCNC: 33 MG/DL
HGB BLD-MCNC: 13.5 G/DL (ref 14–18)
IMM GRANULOCYTES # BLD AUTO: 0.1 K/UL (ref 0–0.11)
IMM GRANULOCYTES NFR BLD AUTO: 1.2 % (ref 0–0.9)
LDLC SERPL CALC-MCNC: 110 MG/DL
LYMPHOCYTES # BLD AUTO: 1.44 K/UL (ref 1–4.8)
LYMPHOCYTES NFR BLD: 16.9 % (ref 22–41)
MCH RBC QN AUTO: 28.1 PG (ref 27–33)
MCHC RBC AUTO-ENTMCNC: 33.3 G/DL (ref 33.7–35.3)
MCV RBC AUTO: 84.4 FL (ref 81.4–97.8)
MONOCYTES # BLD AUTO: 0.73 K/UL (ref 0–0.85)
MONOCYTES NFR BLD AUTO: 8.6 % (ref 0–13.4)
NEUTROPHILS # BLD AUTO: 6.06 K/UL (ref 1.82–7.42)
NEUTROPHILS NFR BLD: 71.3 % (ref 44–72)
NRBC # BLD AUTO: 0 K/UL
NRBC BLD-RTO: 0 /100 WBC
PLATELET # BLD AUTO: 472 K/UL (ref 164–446)
PMV BLD AUTO: 10 FL (ref 9–12.9)
POTASSIUM SERPL-SCNC: 4.2 MMOL/L (ref 3.6–5.5)
PROT SERPL-MCNC: 6.5 G/DL (ref 6–8.2)
RBC # BLD AUTO: 4.81 M/UL (ref 4.7–6.1)
SODIUM SERPL-SCNC: 133 MMOL/L (ref 135–145)
TRIGL SERPL-MCNC: 65 MG/DL (ref 0–149)
WBC # BLD AUTO: 8.5 K/UL (ref 4.8–10.8)

## 2018-11-11 PROCEDURE — 4A10X4Z MONITORING OF CENTRAL NERVOUS ELECTRICAL ACTIVITY, EXTERNAL APPROACH: ICD-10-PCS | Performed by: PSYCHIATRY & NEUROLOGY

## 2018-11-11 PROCEDURE — 700105 HCHG RX REV CODE 258: Performed by: PHYSICAL MEDICINE & REHABILITATION

## 2018-11-11 PROCEDURE — 70496 CT ANGIOGRAPHY HEAD: CPT

## 2018-11-11 PROCEDURE — 80053 COMPREHEN METABOLIC PANEL: CPT

## 2018-11-11 PROCEDURE — 99233 SBSQ HOSP IP/OBS HIGH 50: CPT | Mod: GC | Performed by: INTERNAL MEDICINE

## 2018-11-11 PROCEDURE — 95951 EEG: CPT | Mod: 26,52 | Performed by: PSYCHIATRY & NEUROLOGY

## 2018-11-11 PROCEDURE — 700117 HCHG RX CONTRAST REV CODE 255: Performed by: PSYCHIATRY & NEUROLOGY

## 2018-11-11 PROCEDURE — 700102 HCHG RX REV CODE 250 W/ 637 OVERRIDE(OP): Performed by: STUDENT IN AN ORGANIZED HEALTH CARE EDUCATION/TRAINING PROGRAM

## 2018-11-11 PROCEDURE — A9270 NON-COVERED ITEM OR SERVICE: HCPCS | Performed by: STUDENT IN AN ORGANIZED HEALTH CARE EDUCATION/TRAINING PROGRAM

## 2018-11-11 PROCEDURE — 85025 COMPLETE CBC W/AUTO DIFF WBC: CPT

## 2018-11-11 PROCEDURE — 700102 HCHG RX REV CODE 250 W/ 637 OVERRIDE(OP): Performed by: PHYSICAL MEDICINE & REHABILITATION

## 2018-11-11 PROCEDURE — 72052 X-RAY EXAM NECK SPINE 6/>VWS: CPT

## 2018-11-11 PROCEDURE — 70498 CT ANGIOGRAPHY NECK: CPT

## 2018-11-11 PROCEDURE — 80061 LIPID PANEL: CPT

## 2018-11-11 PROCEDURE — 99222 1ST HOSP IP/OBS MODERATE 55: CPT | Mod: 25 | Performed by: PSYCHIATRY & NEUROLOGY

## 2018-11-11 PROCEDURE — 770020 HCHG ROOM/CARE - TELE (206)

## 2018-11-11 PROCEDURE — A9270 NON-COVERED ITEM OR SERVICE: HCPCS | Performed by: PHYSICAL MEDICINE & REHABILITATION

## 2018-11-11 PROCEDURE — 95951 EEG: CPT | Mod: 52 | Performed by: PSYCHIATRY & NEUROLOGY

## 2018-11-11 RX ADMIN — STANDARDIZED SENNA CONCENTRATE AND DOCUSATE SODIUM 2 TABLET: 8.6; 5 TABLET, FILM COATED ORAL at 05:12

## 2018-11-11 RX ADMIN — HYDROCHLOROTHIAZIDE 12.5 MG: 25 TABLET ORAL at 05:09

## 2018-11-11 RX ADMIN — ACETAMINOPHEN 650 MG: 325 TABLET, FILM COATED ORAL at 19:27

## 2018-11-11 RX ADMIN — SODIUM CHLORIDE: 9 INJECTION, SOLUTION INTRAVENOUS at 12:13

## 2018-11-11 RX ADMIN — RIVAROXABAN 15 MG: 15 TABLET, FILM COATED ORAL at 17:46

## 2018-11-11 RX ADMIN — SODIUM CHLORIDE: 9 INJECTION, SOLUTION INTRAVENOUS at 07:22

## 2018-11-11 RX ADMIN — LISINOPRIL 20 MG: 20 TABLET ORAL at 05:09

## 2018-11-11 RX ADMIN — VITAMIN D, TAB 1000IU (100/BT) 1000 UNITS: 25 TAB at 05:10

## 2018-11-11 RX ADMIN — RIVAROXABAN 15 MG: 15 TABLET, FILM COATED ORAL at 05:09

## 2018-11-11 RX ADMIN — IOHEXOL 100 ML: 350 INJECTION, SOLUTION INTRAVENOUS at 17:25

## 2018-11-11 RX ADMIN — SODIUM CHLORIDE: 9 INJECTION, SOLUTION INTRAVENOUS at 01:04

## 2018-11-11 ASSESSMENT — ENCOUNTER SYMPTOMS
DOUBLE VISION: 0
DIZZINESS: 0
BLURRED VISION: 0
TREMORS: 0
PALPITATIONS: 0
NECK PAIN: 0
DEPRESSION: 0
NAUSEA: 0
CONSTIPATION: 1
SENSORY CHANGE: 0
CHILLS: 0
HEMOPTYSIS: 0
COUGH: 0
HEADACHES: 0
ABDOMINAL PAIN: 0
SEIZURES: 0
FALLS: 0
MYALGIAS: 0
FEVER: 0
VOMITING: 0
BRUISES/BLEEDS EASILY: 0

## 2018-11-11 ASSESSMENT — COGNITIVE AND FUNCTIONAL STATUS - GENERAL
WALKING IN HOSPITAL ROOM: A LITTLE
STANDING UP FROM CHAIR USING ARMS: A LOT
DRESSING REGULAR LOWER BODY CLOTHING: A LOT
HELP NEEDED FOR BATHING: A LOT
SUGGESTED CMS G CODE MODIFIER MOBILITY: CJ
CLIMB 3 TO 5 STEPS WITH RAILING: A LITTLE
DAILY ACTIVITIY SCORE: 20
SUGGESTED CMS G CODE MODIFIER DAILY ACTIVITY: CJ
MOBILITY SCORE: 20

## 2018-11-11 ASSESSMENT — PATIENT HEALTH QUESTIONNAIRE - PHQ9
SUM OF ALL RESPONSES TO PHQ9 QUESTIONS 1 AND 2: 0
1. LITTLE INTEREST OR PLEASURE IN DOING THINGS: NOT AT ALL
2. FEELING DOWN, DEPRESSED, IRRITABLE, OR HOPELESS: NOT AT ALL

## 2018-11-11 ASSESSMENT — PAIN SCALES - GENERAL
PAINLEVEL_OUTOF10: 0
PAINLEVEL_OUTOF10: 0

## 2018-11-11 NOTE — CONSULTS
"Hospital Neurology Consult:    Referring Physician: Dr. Enriquez     Reason for consultation: Syncope    HPI: Sumeet Buenrostro is a 58 y.o. male with history of right knee replacement on 10/25/2018, recently diagnosed pulmonary embolus on 11/1/2018 on Xarelto, and hypertension presenting to the hospital for syncope and consulted for syncope.  The patient states that soon after his surgery he has been having \"pass out\" events of which she has had 5 total.  They all happen while he is in the shower.  Specifically, they may happen when he is getting in or out of the shower or when he is physically exerting himself by drying himself off.  He states these events did not occur prior to his surgery.  The patient states that prior to the event, he feels that his \"throat is tightening\" after which he feels a flush over his face.  Subsequently, he will feel lightheaded and eventually will have darkening of his vision before losing consciousness.  Duration of loss of consciousness is anywhere from 10 seconds to 2 minutes at its longest.  The patient's daughter is present and details the latest event which brought him to the hospital.  According to the daughter, the patient had loss of consciousness during which he slumped forward and had snoring.  During the event, he had several nonrhythmic and asymmetric jerking of the upper and lower extremities.  Loss of consciousness lasted 1-2 minutes after which he had slurred speech and some confusion which according to the daughter lasted another 5 or so minutes.  At the time, she was concerned that the Xarelto that he is on was not enough to adequately thin his blood and he was having mini strokes.  When EMS arrived at the scene, they took blood pressure and oxygenation which the daughter reports were \"very low\" however she does not recall the blood pressure numbers.  She states his oxygenation was anywhere from 67-80%.  The patient denies any similar events in the past however he does " state that when he changes position from lying to sitting he will become lightheaded for approximately 30 seconds.  The patient denies any recent palpitations, fevers, chills, changes in medications, or dehydration when the event occurred.    ROS:     As above. All other systems reviewed and are negative.    Past Medical History:    history of right knee replacement on 10/25/2018, recently diagnosed pulmonary embolus on 11/1/2018 on Xarelto, and hypertension    FHx:  Noncontributory    SHx:   reports that he has never smoked. His smokeless tobacco use includes Snuff and Chew. He reports that he does not drink alcohol or use drugs.    Allergies:  No Known Allergies    Medications:    Current Facility-Administered Medications:   •  senna-docusate (PERICOLACE or SENOKOT S) 8.6-50 MG per tablet 2 Tab, 2 Tab, Oral, BID, 2 Tab at 11/11/18 0512 **AND** polyethylene glycol/lytes (MIRALAX) PACKET 1 Packet, 1 Packet, Oral, QDAY PRN **AND** magnesium hydroxide (MILK OF MAGNESIA) suspension 30 mL, 30 mL, Oral, QDAY PRN **AND** bisacodyl (DULCOLAX) suppository 10 mg, 10 mg, Rectal, QDAY PRN, Alba Poole M.D.  •  Respiratory Care per Protocol, , Nebulization, Continuous RT, Alba Poole M.D.  •  ondansetron (ZOFRAN) syringe/vial injection 4 mg, 4 mg, Intravenous, Q4HRS PRN, Alba Poole M.D.  •  ondansetron (ZOFRAN ODT) dispertab 4 mg, 4 mg, Oral, Q4HRS PRN, Alba Poole M.D.  •  promethazine (PHENERGAN) tablet 12.5-25 mg, 12.5-25 mg, Oral, Q4HRS PRN, Alba Poole M.D.  •  promethazine (PHENERGAN) suppository 12.5-25 mg, 12.5-25 mg, Rectal, Q4HRS PRN, Alba oPole M.D.  •  prochlorperazine (COMPAZINE) injection 5-10 mg, 5-10 mg, Intravenous, Q4HRS PRN, Alba Poole M.D.  •  guaiFENesin dextromethorphan (ROBITUSSIN DM) 100-10 MG/5ML syrup 10 mL, 10 mL, Oral, Q6HRS PRN, Alba Poole M.D.  •  NS infusion, , Intravenous, Continuous, Alba Poole M.D.,  Last Rate: 150 mL/hr at 11/11/18 1213  •  rivaroxaban (XARELTO) tablet 15 mg, 15 mg, Oral, BID, Sean Rivero D.O., 15 mg at 11/11/18 0509  •  acetaminophen (TYLENOL) tablet 650 mg, 650 mg, Oral, 4X/DAY, Alba Poole M.D., 650 mg at 11/10/18 2019  •  oxyCODONE immediate-release (ROXICODONE) tablet 5 mg, 5 mg, Oral, Q6HRS PRN, Jaci Enriquez M.D.  •  lisinopril (PRINIVIL) tablet 20 mg, 20 mg, Oral, Q DAY, 20 mg at 11/11/18 0509 **AND** hydroCHLOROthiazide (HYDRODIURIL) tablet 12.5 mg, 12.5 mg, Oral, Q DAY, Alba Poole M.D., 12.5 mg at 11/11/18 0509  •  vitamin D (cholecalciferol) tablet 1,000 Units, 1,000 Units, Oral, DAILY, Alba Poole M.D., 1,000 Units at 11/11/18 0510    Vitals:   Vitals:    11/11/18 0500 11/11/18 0836 11/11/18 1105 11/11/18 1200   BP: 132/83 130/76 124/73 121/74   Pulse: 68 74 77 68   Resp: 15 19 17 16   Temp: 36.4 °C (97.6 °F) 36.6 °C (97.8 °F) 36.8 °C (98.3 °F) 36.6 °C (97.9 °F)   SpO2: 94% 95% 91% 97%   Weight:       Height:           Labs:  Lab Results   Component Value Date/Time    PROTHROMBTM 13.7 11/02/2018 03:43 AM    INR 1.04 11/02/2018 03:43 AM      Lab Results   Component Value Date/Time    WBC 8.5 11/11/2018 03:35 AM    RBC 4.81 11/11/2018 03:35 AM    HEMOGLOBIN 13.5 (L) 11/11/2018 03:35 AM    HEMATOCRIT 40.6 (L) 11/11/2018 03:35 AM    MCV 84.4 11/11/2018 03:35 AM    MCH 28.1 11/11/2018 03:35 AM    MCHC 33.3 (L) 11/11/2018 03:35 AM    MPV 10.0 11/11/2018 03:35 AM    NEUTSPOLYS 71.30 11/11/2018 03:35 AM    LYMPHOCYTES 16.90 (L) 11/11/2018 03:35 AM    MONOCYTES 8.60 11/11/2018 03:35 AM    EOSINOPHILS 1.60 11/11/2018 03:35 AM    BASOPHILS 0.40 11/11/2018 03:35 AM      Lab Results   Component Value Date/Time    SODIUM 133 (L) 11/11/2018 03:35 AM    POTASSIUM 4.2 11/11/2018 03:35 AM    CHLORIDE 101 11/11/2018 03:35 AM    CO2 27 11/11/2018 03:35 AM    GLUCOSE 95 11/11/2018 03:35 AM    BUN 22 11/11/2018 03:35 AM    CREATININE 1.09 11/11/2018 03:35 AM       Lab Results   Component Value Date/Time    CHOLSTRLTOT 156 11/11/2018 03:35 AM     (H) 11/11/2018 03:35 AM    HDL 33 (A) 11/11/2018 03:35 AM    TRIGLYCERIDE 65 11/11/2018 03:35 AM       Lab Results   Component Value Date/Time    ALKPHOSPHAT 61 11/11/2018 03:35 AM    ASTSGOT 14 11/11/2018 03:35 AM    ALTSGPT 19 11/11/2018 03:35 AM    TBILIRUBIN 0.7 11/11/2018 03:35 AM      Imaging:  MRI of the brain without contrast personally reviewed and was within normal limits.  No significant white matter disease.  Carotid ultrasound did not show significant stenosis in the bilateral carotid arteries and showed anterograde flow over the bilateral vertebral arteries.  Echocardiogram on 11/10/2018 within normal limits  EEG personally reviewed and was within normal limits      Physical Exam:     General: Well-appearing 58-year-old male in bed in no acute distress  Cardio: Normal S1/S2. No peripheral edema.  No JVD  Pulm: CTAX2. No respiratory distress.   Psychiatric: Normal affect and cognition.  No overt clinical signs of depression.    Neurologic:  Mental Status:  AAOx4. Able to follow commands/cross midline. Speech fluent/nondysarthric. Language functions intact. No neglect/apraxia.  Cranial Nerves:  PERRL. EOMi. Face symmetric, palate/tongue midline. Visual fields full to confrontation. Facial sensation intact.   Motor:  Normal muscle tone and bulk. Strength is 5/5 throughout with the exception of the right lower extremity which could not be tested secondary to pain and recent surgery, however dorsiflexion and plantar flexion in that extremity was 5/5.  Reflexes: 1/4 throughout. Flexor plantar responses bilaterally. Absent Jane's reflex.  Coordination:  Finger-nose/heel-shin without ataxia or dysmetria.   Sensation:  Normal to pinprick, soft touch, proprioception.   Gait/Station: Deferred    Assessment/Plan:    Sumeet Buenrostro is a 58 y.o. male with history of right knee replacement on 10/25/2018, recently  diagnosed pulmonary embolus on 11/1/2018 on Xarelto, and hypertension presenting to the hospital for syncope and consulted for syncope.  At this time, the patient's event appears to be most consistent with convulsive syncope, given that the patient had low blood pressure and low oxygenation at the time of the event.  Jerking especially when asymmetric and asynchronous may be seen with a convulsive syncope event.  At this time, etiology of the event is unknown.  Given that the patient reports that at times it happens when he is raising his arms over his head a subclavian steal syndrome may be a consideration.  While the carotid ultrasound demonstrated anterograde flow over the bilateral vertebral arteries, this may not be sufficient to demonstrate stenosis within that system.  At this time, given the semiology of the events as well as the factors which trigger them suspicion for seizures is low.  Given that the events began occurring soon after his knee surgery and subsequent pulmonary embolus, cardiac arrhythmia cannot be completely excluded however this has not been seen during his hospitalization so far.    Plan:  1.  Obtain CT angiogram of the neck to evaluate the vertebral artery origins  2.  Reassess the need for antihypertensives and titrate doses as needed  3.  Continue Xarelto  4.  EEG within normal limits.  At this time, suspicion for seizures is low  5.  Recommend cardiac rhythm monitoring for possible arrhythmia  6.  If possible, allow the patient to shower under supervision given that the events appeared to occur in the setting.  7.  The patient needs to remain well-hydrated, and salt may be liberalized as needed if the above workup is unrevealing.  Similarly, compression stockings may be used as well.  8.  All others per primary team.  We will follow with above results.      Jerome Encinas M.D., Diplomat of the American Board of Psychiatry and Neurology  [facility]  Neurology    Neurology signs off.  IF CTA is abnormal, please call back to Dr Peralta to discuss management then   Follow up as outpatient per neurology in 4-6 weeks otherwise.

## 2018-11-11 NOTE — PROGRESS NOTES
Internal Medicine Interval Note  Note Author: Sean Rivero D.O.     Name Sumeet Buenrostro     1960   Age/Sex 58 y.o. male   MRN [id number]   Code Status Full     After 5PM or if no immediate response to page, please call for cross-coverage  Attending/Team: Dr. Enriquez/Juan See Patient List for primary contact information  Call [phone] to page    1st Call - Day Intern (R1):   Dr. Rivero 2nd Call - Day Sr. Resident (R2/R3):   Dr. Sesay       Reason for interval visit  (Principal Problem)   Syncope      Interval Problem Daily Status Update  (24 hours, problem oriented, brief subjective history, new lab/imaging data pertinent to that problem)     The patient is a 58 year old male with a history of PE following knee replacement surgery who presented following 5 episodes of syncope after exiting the shower.    No syncope overnight. The patient reports that when he experienced the syncope, his O2 saturation decreased to 67%. Last syncopal episode per wife at bedside lasted approximately 30 seconds. 4 out of the 5 times the patient was sitting down.    MRI showed no acute abnormalities.  Neurology has been consulted.  We ordered a x-ray of the cervical spine with flexion and extension to evaluate for possible atlantoaxial instability and/or bone spurs that may be causing vertebrobasilar insufficiency.    Review of Systems   Constitutional: Negative for chills and fever.   HENT: Negative for hearing loss and tinnitus.    Eyes: Negative for blurred vision and double vision.   Respiratory: Negative for cough and hemoptysis.    Cardiovascular: Negative for chest pain and palpitations.   Gastrointestinal: Negative for abdominal pain, nausea and vomiting.   Genitourinary: Negative for dysuria and frequency.   Musculoskeletal: Negative for falls, myalgias and neck pain.   Skin: Negative for itching and rash.   Neurological: Negative for dizziness, tremors, sensory change, seizures and headaches.    Endo/Heme/Allergies: Does not bruise/bleed easily.   Psychiatric/Behavioral: Negative for depression and suicidal ideas.       Disposition/Barriers to discharge:   None     Consultants/Specialty  None  PCP: Candie Cano M.D.      Quality Measures  Quality-Core Measures   Reviewed items::  Labs reviewed and Medications reviewed  Salazar catheter::  No Salazar  DVT Prophylaxis is Xarelto at this time        Physical Exam       Vitals:    11/11/18 0500 11/11/18 0836 11/11/18 1105 11/11/18 1200   BP: 132/83 130/76 124/73 121/74   Pulse: 68 74 77 68   Resp: 15 19 17 16   Temp: 36.4 °C (97.6 °F) 36.6 °C (97.8 °F) 36.8 °C (98.3 °F) 36.6 °C (97.9 °F)   SpO2: 94% 95% 91% 97%   Weight:       Height:         Body mass index is 34.31 kg/m². Weight: 124.5 kg (274 lb 8 oz)  Oxygen Therapy:  Pulse Oximetry: 97 %, O2 (LPM): 0, O2 Delivery: None (Room Air)    Physical Exam   Constitutional: He is oriented to person, place, and time and well-developed, well-nourished, and in no distress. No distress.   HENT:   Head: Normocephalic and atraumatic.   Eyes: Pupils are equal, round, and reactive to light. EOM are normal.   Neck: Neck supple. No thyromegaly present.   Cardiovascular: Normal rate, regular rhythm and normal heart sounds.  Exam reveals no gallop and no friction rub.    No murmur heard.  Pulmonary/Chest: Effort normal and breath sounds normal. No respiratory distress. He has no wheezes.   Abdominal: Soft. Bowel sounds are normal. He exhibits no distension. There is no tenderness.   Musculoskeletal: Normal range of motion. He exhibits no edema or deformity.   Neurological: He is alert and oriented to person, place, and time. No cranial nerve deficit. Coordination normal.   Skin: Skin is warm. No rash noted. He is not diaphoretic. No erythema.   Psychiatric: Mood, memory, affect and judgment normal.   Nursing note and vitals reviewed.            Assessment/Plan     * Syncope- (present on admission)   Assessment & Plan    Pt with  pulmonary emboli on Xarelto has had 5 syncopal episodes in the shower while sitting down and raising his arms up since his knee replacement on 10/25.  -No postictal state, however the patient does state some associated slurring of speech after episodes of syncope.  -Vertebrobasilar insufficiency is usually associated with pre-syncope.  -Previous echo showed EF of 60, however patient has new symptoms of shortness of breath associated with syncope-warrants new echocardiogram.  -MRI brain showed no acute intracranial abnormalities  -Echcardiogram essentially normal    Plan  -Neurology Consult placed     Pulmonary embolism (HCC)- (present on admission)   Assessment & Plan    Patient came to ED and was diagnosed with PE by CT-PE on November 1st. Had recent orthopedic surgery for right knee replacement on 10/25th.   Patient states he has been taking Xarelto 15mg BID.       Diabetes (HCC)- (present on admission)   Assessment & Plan    Patient has history of T2DM  A1C 6.7% on 10/12  Pt appears to be unaware of dx.   He drinks gallons of unsweetened tea/day. States that recently he has been taking 2 gallons of tea that is mostly water (only two tea bags) over the past few days.  Diabetes education   Diabetic diet  Continue Accue checks   Continue Hypoglycemia protocol     HTN (hypertension)- (present on admission)   Assessment & Plan    Continue lisinopril/HCTZ       * Syncope- (present on admission)   Assessment & Plan    Pt with pulmonary emboli on Xarelto has had 5 syncopal episodes in the shower while sitting down and raising his arms up since his knee replacement on 10/25.  -No postictal state, however the patient does state some associated slurring of speech after episodes of syncope.  -Vertebrobasilar insufficiency is usually associated with pre-syncope.  -Previous echo showed EF of 60, however patient has new symptoms of shortness of breath associated with syncope-warrants new echocardiogram.  -MRI brain showed no  acute intracranial abnormalities  -Echcardiogram essentially normal    Plan  -Neurology Consult placed     Pulmonary embolism (HCC)- (present on admission)   Assessment & Plan    Patient came to ED and was diagnosed with PE by CT-PE on November 1st. Had recent orthopedic surgery for right knee replacement on 10/25th.   Patient states he has been taking Xarelto 15mg BID.       Diabetes (HCC)- (present on admission)   Assessment & Plan    Patient has history of T2DM  A1C 6.7% on 10/12  Pt appears to be unaware of dx.   He drinks gallons of unsweetened tea/day. States that recently he has been taking 2 gallons of tea that is mostly water (only two tea bags) over the past few days.  Diabetes education   Diabetic diet  Continue Accue checks   Continue Hypoglycemia protocol     HTN (hypertension)- (present on admission)   Assessment & Plan    Continue lisinopril/HCTZ

## 2018-11-11 NOTE — NON-PROVIDER
.         Internal Medicine Medical Student Note  Note Author: Carlos Enrique Bautista, Student    Name Sumeet Buenrostro     1960   Age/Sex 58 y.o. male   MRN [id number]   Code Status FULL       Reason for interval visit  (Principal Problem)   Syncope    Interval Problem Daily Status Update  (problem status, last 24 hours, new history, new data )     Patient is a 58 year old male who presented to the ED after a syncopal episode. Patient did not have any acute events overnight. Plan is to do an EEG, consult neurology, and Cervical Spine Flexion-Extension Imaging to evaluate for other possible etiologies.     Patient's MRI did not show any acute changes. US Doppler and Vascular did not show anything significant. EF is 65%. ECHO normal with no structural defects.     Patient does not have any complaints today. He feels tired and has not had a BM since Wednesday. He denies SOB and is now on room air.     Review of Systems   Constitutional: Positive for malaise/fatigue.   Eyes: Negative for blurred vision.   Cardiovascular: Negative for chest pain.   Gastrointestinal: Positive for constipation. Negative for abdominal pain.   Neurological: Negative for dizziness.             Physical Exam       Vitals:    11/10/18 1650 11/10/18 2001 11/11/18 0046 18 0500   BP: 110/67 111/65 127/74 132/83   Pulse: 68 76 68 68   Resp: 16 17 16 15   Temp: 36.9 °C (98.4 °F) 36.7 °C (98 °F) 36.5 °C (97.7 °F) 36.4 °C (97.6 °F)   SpO2:   92% 94%   Weight:  124.5 kg (274 lb 8 oz)     Height:         Body mass index is 34.31 kg/m². Weight: 124.5 kg (274 lb 8 oz)  Oxygen Therapy:  Pulse Oximetry: 94 %, O2 (LPM): 0, O2 Delivery: None (Room Air)    Physical Exam   Constitutional: He is oriented to person, place, and time. No distress.   Comfortably laying in bed   Cardiovascular: Normal rate, regular rhythm and normal heart sounds.  Exam reveals no gallop and no friction rub.    No murmur heard.  Pulmonary/Chest: Effort normal and breath sounds  normal. No respiratory distress. He has no wheezes.   Abdominal: Soft. Bowel sounds are normal. He exhibits no distension. There is tenderness.   Tenderness to deep palpation in the epigastric region   Neurological: He is alert and oriented to person, place, and time.   Skin: Skin is warm and dry.     Results for SALIMA TORRES (MRN [id number]) as of 11/11/2018 08:02   Ref. Range 11/11/2018 03:35   WBC Latest Ref Range: 4.8 - 10.8 K/uL 8.5   RBC Latest Ref Range: 4.70 - 6.10 M/uL 4.81   Hemoglobin Latest Ref Range: 14.0 - 18.0 g/dL 13.5 (L)   Hematocrit Latest Ref Range: 42.0 - 52.0 % 40.6 (L)   MCV Latest Ref Range: 81.4 - 97.8 fL 84.4   MCH Latest Ref Range: 27.0 - 33.0 pg 28.1   MCHC Latest Ref Range: 33.7 - 35.3 g/dL 33.3 (L)   RDW Latest Ref Range: 35.9 - 50.0 fL 42.3   Platelet Count Latest Ref Range: 164 - 446 K/uL 472 (H)   MPV Latest Ref Range: 9.0 - 12.9 fL 10.0     Results for SALIMA TORRES (MRN [id number]) as of 11/11/2018 08:02   Ref. Range 11/11/2018 03:35   Sodium Latest Ref Range: 135 - 145 mmol/L 133 (L)   Potassium Latest Ref Range: 3.6 - 5.5 mmol/L 4.2   Chloride Latest Ref Range: 96 - 112 mmol/L 101   Co2 Latest Ref Range: 20 - 33 mmol/L 27   Anion Gap Latest Ref Range: 0.0 - 11.9  5.0   Glucose Latest Ref Range: 65 - 99 mg/dL 95   Bun Latest Ref Range: 8 - 22 mg/dL 22   Creatinine Latest Ref Range: 0.50 - 1.40 mg/dL 1.09   GFR If  Latest Ref Range: >60 mL/min/1.73 m 2 >60   GFR If Non  Latest Ref Range: >60 mL/min/1.73 m 2 >60   Calcium Latest Ref Range: 8.5 - 10.5 mg/dL 8.9   AST(SGOT) Latest Ref Range: 12 - 45 U/L 14   ALT(SGPT) Latest Ref Range: 2 - 50 U/L 19   Alkaline Phosphatase Latest Ref Range: 30 - 99 U/L 61   Total Bilirubin Latest Ref Range: 0.1 - 1.5 mg/dL 0.7   Albumin Latest Ref Range: 3.2 - 4.9 g/dL 3.4   Total Protein Latest Ref Range: 6.0 - 8.2 g/dL 6.5   Globulin Latest Ref Range: 1.9 - 3.5 g/dL 3.1   A-G Ratio Latest Units: g/dL 1.1    Results for SALIMA TORRES (MRN [id number]) as of 11/11/2018 08:02   Ref. Range 11/11/2018 03:35   Cholesterol,Tot Latest Ref Range: 100 - 199 mg/dL 156   Triglycerides Latest Ref Range: 0 - 149 mg/dL 65   HDL Latest Ref Range: >=40 mg/dL 33 (A)   LDL Latest Ref Range: <100 mg/dL 110 (H)     MRI:   MRI of the brain without contrast within normal limits.    US - Carotid Doppler:  Very mild smooth plaque of the bifurcation extending into the internal    carotid. Velocities are consistent with < 50% stenosis of the internal    carotid artery.    Subclavian and vertebral artery waveforms are antegrade and waveforms are    normal in character and velocity.       US - Vasculature  Very mild smooth plaque of the bifurcation extending into the internal    carotid. Velocities are consistent with < 50% stenosis of the internal    carotid artery.    Subclavian and vertebral artery waveforms are antegrade and waveforms are    normal in character and velocity.     EF: 65%    ECHO: normal  Assessment/Plan     Patient is a 58 year old male inpatient following multiple syncopal episodes with etiology unknown.       #Syncope  Patient has had 5 syncopal episodes that happen in the shower. Patient's wife say that he turns gray and cyanotic. Afterwards he has slurring of speech, but no post-ictal state. Etiology could be vasovagal, cardiogenic, or neurogenic. Vertebrobasilar insufficiency possible, however usually accompanied by pre-syncope. However, patient's MRI, US Doppler & Vasculature, and ECHO have all been normal. EEG is pending.      Plan  - EEG  - Neurology Consult  - Cervical Spine Flexion-Extension Imaging Pending      #Pulmonary Embolism  Patient has a history of PE since November 1st and is currently on Xareleto. Recently had a right knee replacement.        #Diabetes  PMH Type 2 Diabetes. A1C 6.7%. Patient seems to be unaware of diagnosis.     Plan  - Diabetes Education  - Accu checks  - Hypoglycemia protocol        #HTN  - continue lisinopril/HCTZ

## 2018-11-11 NOTE — PROGRESS NOTES
Assessment completed. Pt A&Ox4. Respirations are even and unlabored on RA. Pt denies pain at this time. Bandage to right knee post surgery, CDI. Neuro intact. Monitors applied, VS stable, call light and belongings within reach. POC updated. Pt educated on room and call light, pt verbalized understanding. Communication board updated. Needs met. Wife at bedside.

## 2018-11-11 NOTE — PROGRESS NOTES
Report given to jessica Melvin RN.  Patient transported to [facility] via hospital bed with tele monitor. Belongings, chart and medication with patient. Wife with patient.

## 2018-11-11 NOTE — PROCEDURES
VIDEO ELECTROENCEPHALOGRAM REPORT      Referring provider: Dr. Schafer.     DOS: 11/11/2018 (total recording of 32 minutes).     INDICATION:  Sumeet Buenrostro 58 y.o. male presenting with syncope, head jerks, confusion.     CURRENT ANTIEPILEPTIC REGIMEN: None.     TECHNIQUE: 30 channel video electroencephalogram (EEG) was performed in accordance with the international 10-20 system. The study was reviewed in bipolar and referential montages. The recording examined the patient during wakeful and drowsy/sleep state(s).     DESCRIPTION OF THE RECORD:  During the wakefulness, the background showed a symmetrical 10 Hz alpha activity posteriorly with amplitude of 70 mV.  There was reactivity to eye closure/opening.  A normal anterior-posterior gradient was noted with faster beta frequencies seen anteriorly.  During drowsiness, theta/delta frequencies were seen.    During the sleep state, background shows diffuse high-amplitude 4-5 Hz delta activity.  Symmetrical high-amplitude sleep spindles and vertex sharps were seen in the leads over the central regions.     ACTIVATION PROCEDURES:   Intermittent Photic stimulation was performed in a stepwise fashion from 1 to 30 Hz and elicited a normal response (photic driving), most noticeable in the posterior leads.      ICTAL AND/OR INTERICTAL FINDINGS:   No focal or generalized epileptiform activity noted. No regional slowing was seen during this routine study.  No seizures were reported or recorded during the study.     EKG: sampling of the EKG recording demonstrated sinus rhythm.     EVENTS:  Head, arms myoclonus was captured during the study and is in keeping with hypnagogic myoclonus, a normal pattern related to sleep. The events were not epileptic.     INTERPRETATION:  This is a normal video EEG recording in the awake, drowsy, and sleep state(s). Several episodes of head, jaw, arms myoclonus were captured during the study and these were in keeping with hypnagogic myoclonus, a  normal pattern related to sleep. The events were not epileptic.  Clinical correlation is recommended.    Note: A normal EEG does not rule out epilepsy.  If the clinical suspicion remains high for seizures, a prolonged recording to capture clinical or subclinical events may be helpful.        Jeff Lucero MD   Epilepsy and Neurodiagnostics.   Clinical  of Neurology [facility].   Diplomate in Neurology, Epilepsy, and Electrodiagnostic Medicine.   Office: [phone]  Fax: [phone]

## 2018-11-11 NOTE — PROGRESS NOTES
Patient awake watching television. Patient denies pain. No distress noted. Call bell in reach of patient.

## 2018-11-12 ENCOUNTER — PATIENT OUTREACH (OUTPATIENT)
Dept: HEALTH INFORMATION MANAGEMENT | Facility: OTHER | Age: 58
End: 2018-11-12

## 2018-11-12 VITALS
DIASTOLIC BLOOD PRESSURE: 76 MMHG | SYSTOLIC BLOOD PRESSURE: 134 MMHG | HEIGHT: 75 IN | WEIGHT: 274.5 LBS | HEART RATE: 77 BPM | BODY MASS INDEX: 34.13 KG/M2 | RESPIRATION RATE: 18 BRPM | OXYGEN SATURATION: 96 % | TEMPERATURE: 98.7 F

## 2018-11-12 PROCEDURE — A9270 NON-COVERED ITEM OR SERVICE: HCPCS | Performed by: PHYSICAL MEDICINE & REHABILITATION

## 2018-11-12 PROCEDURE — 99239 HOSP IP/OBS DSCHRG MGMT >30: CPT | Mod: GC | Performed by: HOSPITALIST

## 2018-11-12 PROCEDURE — A9270 NON-COVERED ITEM OR SERVICE: HCPCS | Performed by: STUDENT IN AN ORGANIZED HEALTH CARE EDUCATION/TRAINING PROGRAM

## 2018-11-12 PROCEDURE — A9270 NON-COVERED ITEM OR SERVICE: HCPCS | Performed by: INTERNAL MEDICINE

## 2018-11-12 PROCEDURE — 700102 HCHG RX REV CODE 250 W/ 637 OVERRIDE(OP): Performed by: STUDENT IN AN ORGANIZED HEALTH CARE EDUCATION/TRAINING PROGRAM

## 2018-11-12 PROCEDURE — 700102 HCHG RX REV CODE 250 W/ 637 OVERRIDE(OP): Performed by: INTERNAL MEDICINE

## 2018-11-12 PROCEDURE — 700105 HCHG RX REV CODE 258: Performed by: PHYSICAL MEDICINE & REHABILITATION

## 2018-11-12 PROCEDURE — 700102 HCHG RX REV CODE 250 W/ 637 OVERRIDE(OP): Performed by: PHYSICAL MEDICINE & REHABILITATION

## 2018-11-12 RX ORDER — LISINOPRIL 10 MG/1
10 TABLET ORAL
Status: DISCONTINUED | OUTPATIENT
Start: 2018-11-13 | End: 2018-11-12 | Stop reason: HOSPADM

## 2018-11-12 RX ORDER — LISINOPRIL 10 MG/1
10 TABLET ORAL DAILY
Qty: 30 TAB | Refills: 0 | Status: SHIPPED | OUTPATIENT
Start: 2018-11-13 | End: 2018-12-26

## 2018-11-12 RX ORDER — LISINOPRIL 10 MG/1
10 TABLET ORAL DAILY
Qty: 30 TAB | Refills: 0 | Status: SHIPPED | OUTPATIENT
Start: 2018-11-13 | End: 2018-11-12

## 2018-11-12 RX ADMIN — LISINOPRIL 20 MG: 20 TABLET ORAL at 05:31

## 2018-11-12 RX ADMIN — SODIUM CHLORIDE: 9 INJECTION, SOLUTION INTRAVENOUS at 06:59

## 2018-11-12 RX ADMIN — ACETAMINOPHEN 650 MG: 325 TABLET, FILM COATED ORAL at 10:32

## 2018-11-12 RX ADMIN — VITAMIN D, TAB 1000IU (100/BT) 1000 UNITS: 25 TAB at 05:31

## 2018-11-12 RX ADMIN — HYDROCHLOROTHIAZIDE 12.5 MG: 25 TABLET ORAL at 05:32

## 2018-11-12 RX ADMIN — SODIUM CHLORIDE: 9 INJECTION, SOLUTION INTRAVENOUS at 00:30

## 2018-11-12 RX ADMIN — OXYCODONE HYDROCHLORIDE 5 MG: 5 TABLET ORAL at 02:24

## 2018-11-12 RX ADMIN — RIVAROXABAN 15 MG: 15 TABLET, FILM COATED ORAL at 05:30

## 2018-11-12 ASSESSMENT — ENCOUNTER SYMPTOMS
TREMORS: 0
FEVER: 0
HEADACHES: 0
TINGLING: 0
CONSTIPATION: 0
FALLS: 0
MYALGIAS: 0
PALPITATIONS: 0
BLURRED VISION: 0
SPEECH CHANGE: 0
SHORTNESS OF BREATH: 0
SEIZURES: 0
CHILLS: 0
VOMITING: 0
DOUBLE VISION: 0
SENSORY CHANGE: 0
NAUSEA: 0
DEPRESSION: 0
BRUISES/BLEEDS EASILY: 0
NECK PAIN: 0
COUGH: 0
HEMOPTYSIS: 0
DIZZINESS: 0
ABDOMINAL PAIN: 0

## 2018-11-12 ASSESSMENT — PAIN SCALES - GENERAL
PAINLEVEL_OUTOF10: 3
PAINLEVEL_OUTOF10: 8
PAINLEVEL_OUTOF10: 0
PAINLEVEL_OUTOF10: 0

## 2018-11-12 NOTE — NON-PROVIDER
.         Internal Medicine Medical Student Note  Note Author: Carlos Enrique Bautista, Student    Name Sumeet Buenrostro     1960   Age/Sex 58 y.o. male   MRN [id number]   Code Status FULL         Reason for interval visit  (Principal Problem)   Syncope    Interval Problem Daily Status Update  (problem status, last 24 hours, new history, new data )   Patient is a 58 year old male with PMHx of PE after a knee replacement in November presented to the ED following 5 syncopal episodes that happen in or around the time he has taken a shower.    His work up has been negative. 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 Patient's Flexion/Extension X-Ray was normal with no instability or subluxation. EEG was normal with suspicion for seizures being low.     Per Neurology: reassessment the need for antihypertensives and titrate doses as needed, continue Xarelto, recommendation of cardiac rhythm monitoring for possible arrhythmia, shower supervision, and well-hydrated and salt if the work up is unrevealing.     This morning patient does not have any complaints. He had a loose bowel movement yesterday and was able to take a shower with assistance. No syncopal episode occurred.    Patient ready for discharge with plan to follow up with neurology, cardiac monitor, decreased dose of lisinopril (1/2 of home dose), and AM cortisol labs.       .Review of Systems   Constitutional: Positive for malaise/fatigue.   Respiratory: Negative for cough and shortness of breath.    Cardiovascular: Negative for chest pain.   Gastrointestinal: Negative for abdominal pain and constipation.   Neurological: Negative for dizziness.         Physical Exam       Vitals:    18 1600 18 2000 18 0000 18 0400   BP: 116/75 141/76 126/78 142/84   Pulse: 73 90 66 69   Resp: 16 16 16 16   Temp: 36.9 °C (98.4 °F) 37.1 °C (98.7 °F) 36.8 °C (98.3 °F) 37.1 °C (98.7 °F)   SpO2: 97% 97% 97%  94%   Weight:       Height:         Body mass index is 34.31 kg/m².    Oxygen Therapy:  Pulse Oximetry: 94 %, O2 (LPM): 0, O2 Delivery: None (Room Air)    Physical Exam   Constitutional: He is oriented to person, place, and time. No distress.   Sleepy, laying in bed   Cardiovascular: Normal rate, regular rhythm and normal heart sounds.  Exam reveals no gallop and no friction rub.    No murmur heard.  Pulmonary/Chest: Effort normal. No respiratory distress. He has no wheezes. He has no rales.   Abdominal: Soft. Bowel sounds are normal. He exhibits no distension and no mass. There is no tenderness. There is no rebound and no guarding.   Neurological: He is alert and oriented to person, place, and time. No cranial nerve deficit.         CTA Head & Neck:    1.  CT angiogram of the [place] within normal limits.    2.  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  Minimal white matter change and cerebral volume loss      CTA Neck:    CT angiogram of the neck within normal limits.      Cervical Spine Flexion/Extension:   1.  Mild degenerative change of lower cervical spine.  2.  No fracture or subluxation.  3.  No evidence for instability.    EEG Interpretation: This is a normal video EEG recording in the awake, drowsy, and sleep state(s). Several episodes of head, jaw, arms myoclonus were captured during the study and these were in keeping with hypnagogic myoclonus, a normal pattern related to sleep. The events were not epileptic.  Clinical correlation is recommended.  Assessment/Plan     Patient is a 58 year old male inpatient following multiple syncopal episodes with etiology unknown with seizures and vertebral insufficiency ruled out.         #Syncope  Patient has had 5 syncopal episodes that happen in the shower. Patient's wife say that he turns gray and cyanotic. Afterwards he has slurring of speech, but no post-ictal state. Etiology could be vasovagal, cardiogenic, or  neurogenic. Vertebrobasilar insufficiency possible, however usually accompanied by pre-syncope. However, patient's MRI, US Doppler & Vasculature, and ECHO have all been normal. EEG is normal. 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 CTA Neck was within normal limits. Cervical Spine Flexion/Extension did not show fracture of subluxation, no evidence, of instability, and some mild degenerative changes of lower cervical spine. Neurology recommendations:  reassessment the need for antihypertensives and titrate doses as needed, continue Xarelto, recommendation of cardiac rhythm monitoring for possible arrhythmia, shower supervision, and well-hydrated and salt if the work up is unrevealing. Patient ready for discharge.       Plan  - Outpatient cardiac rhythm monitoring  - 1/2 dosage of Lisinopril from home dosage  - AM Cortisol Lab  - Follow up with Neurology  - Instructions on getting up slowly and showering without hot water.        #Pulmonary Embolism  Patient has a history of PE since November 1st and is currently on Xareleto. Recently had a right knee replacement.          #Diabetes  PMH Type 2 Diabetes. A1C 6.7%. Patient seems to be unaware of diagnosis.      Plan  - Diabetes Education  - Accu checks  - Hypoglycemia protocol         #HTN  - continue lisinopril at 1/2 original dosage

## 2018-11-12 NOTE — PROGRESS NOTES
Assumed pt care at 1900. Pt A&Ox4, on RA, and vitals stable. Fall precautions in place. Bed locked and in low position. Pt calling appropriately.     Assisted shower with male staff member completed. Pt tolerated well. No syncope episode. Pt denies feeling lightheaded or dizzy.

## 2018-11-12 NOTE — PROGRESS NOTES
Monitor Summary: SR 67-93, [place] .20, QRS .08, QT .40 with rare PVC,PAC & bigem per strip from monitor room

## 2018-11-12 NOTE — CARE PLAN
Problem: Safety  Goal: Will remain free from falls  Outcome: PROGRESSING AS EXPECTED  Pt had first shower tonight since being admitted. Pt sat in shower chair with assistance of a male staff member. Pt denied being lightheaded or dizziness. No syncope episodes.     Problem: Mobility  Goal: Risk for activity intolerance will decrease  Outcome: PROGRESSING AS EXPECTED  Pt still having pain when knee is bent. Pt's gait steady with one assist and walker.

## 2018-11-12 NOTE — PROGRESS NOTES
Patient A&OX4, denies pain, up with 1 assist with the walker, BM today, denies any further needs at this time, bed low and locked, call light within reach

## 2018-11-12 NOTE — PROGRESS NOTES
2 RN skin check performed upon transfer. Patients feet dry. Recent knee replacement incision with dressing clean dry and intact to R leg. All other skin clean dry and intact   Anesthesia Post-op Note      Patient: Elzbieta Ramirez  ANESTHESIA:  Monitor Anesthesia Care   ANESTHESIOLOGIST:  Anesthesiologist: Dulce Hoskins MD      Vital Last Value   Temperature 36.3 Â°C (97.3 Â°F) (04/30/18 0925)   Pulse (!) 48 (04/30/18 1200)   Respiratory 16 (04/30/18 1200)   Non-Invasive  Blood Pressure 120/65 (04/30/18 1200)   Arterial   Blood Pressure     Pulse Oximetry 95 % (04/30/18 1200)       Post-op vital signs: stable. Level of Consciousness: participates in exam, answers questions appropriately, awake and oriented. Respiratory: unassisted, spontaneous ventilation  Cardiovascular: stable and blood pressure at baseline  Hydration: euvolemic  Post-op pain: adequately controlled   Nausea: None  Airway patency: patent  Post-op assessment: No apparent anesthetic complications. Complications: None.     Comments:

## 2018-11-12 NOTE — PROGRESS NOTES
Monitor Summary: SR 67-95, [place] .16, QRS .08, QT .36 with rare PVC & PAC per strip from monitor room

## 2018-11-12 NOTE — DISCHARGE SUMMARY
Internal Medicine Discharge Summary  Note Author: Sean Rivero D.O.       Name Sumeet Buenrostro     1960   Age/Sex 58 y.o. male   MRN [id number]         Admit Date:  2018       Discharge Date:   18    Service:   [facility] Internal Medicine [place] Team  Attending Physician(s):   Dr. Jaci Enriquez; Dr. GILBERT De La Rosa       Senior Resident(s):   Dr. Bertha Sesay  Gerard Resident(s):   Dr. Rivero  PCP: Candie Cano M.D.      Primary Diagnosis:   Syncope    Secondary Diagnoses:                Principal Problem:    Syncope POA: Yes  Active Problems:    Pulmonary embolism (HCC) POA: Yes    HTN (hypertension) POA: Yes    Leucocytosis POA: Yes    Diabetes (HCC) POA: Yes  Resolved Problems:    * No resolved hospital problems. *      Hospital Summary (Brief Narrative):              The patient is a 58-year-old male with a history of hypertension, DVT, bilateral pulmonary embolus, who presented to the hospital for evaluation of syncope.  Of note, the patient had a a recent knee replacement surgery and subsequently developed a DVT and bilateral PEs while on Xarelto. The patient had a right knee replacement on 10/25/2018 and was recently diagnosed with a bilateral pulmonary emboli on 2018 while on Xarelto. Upon discharge from the hospital, the patient experienced 5 episodes of syncope.  Most of these episodes of syncope happened after the patient had finished showering.  In the emergency department on this admission, the patient had a CT without contrast which was negative for acute intracranial changes.         The patient's hospital course was uncomplicated. He was continued on Xarelto while inpatient.  He had no episodes of syncope while hospitalized, including when he took a shower.  MRI of the brain showed no acute changes.  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  Flexion the patient also had a flexion-extension x-ray of the cervical spine which was essentially normal.  Neurology was consulted and an EEG was performed which was negative for seizure activity.  The patient was discharged in good medical condition with instructions to follow-up with neurology in 4-6 weeks.  The patient will also follow-up with his PCP for management of his blood pressure.  Hydrochlorothiazide/losartan was discontinued and lisinopril 10 mg was started as thiazide diuretic may have been contributing to the patient's syncope.  Orthostatic vitals on the day of discharge were negative.  Patient was able to walk down the hallway with no problems and no feelings of dizziness or presyncope. The patient was also instructed to follow-up with his PCP for an a.m. cortisol test. The patient was discharged home in good medical condition.    Patient /Hospital Summary (Details -- Problem Oriented) :          Pulmonary embolism (HCC)   Assessment & Plan    Patient came to ED and was diagnosed with PE by CT-PE on November 1st. Had recent orthopedic surgery for right knee replacement on 10/25th.   Patient states he has been taking Xarelto 15mg BID.  Continue Xarelto 15 mg BID     * Syncope   Assessment & Plan    Pt with pulmonary emboli on Xarelto has had 5 syncopal episodes in the shower while sitting down and raising his arms up since his knee replacement on 10/25.  -No postictal state, however the patient does state some associated slurring of speech after episodes of syncope.  -Vertebrobasilar insufficiency is usually associated with pre-syncope.  -Previous echo showed EF of 60, however patient has new symptoms of shortness of breath associated with syncope-warrants new echocardiogram.  -MRI brain showed no acute intracranial abnormalities  -Echcardiogram essentially normal  -EEG has been normal  - CTA of head showed hypoplastic left vertebral artery, unclear clinical  significance.  -Flexion-extension x-ray of cervical spine normal      Patient to be discharged with follow up with neuro in 4-6 weeks.  Patient will follow up with PCP with AM cortisol.  Discontinue HCTZ outpatient     Diabetes (HCC)   Assessment & Plan    Patient has history of T2DM  A1C 6.7% on 10/12  Pt appears to be unaware of dx.   He drinks gallons of unsweetened tea/day. States that recently he has been taking 2 gallons of tea that is mostly water (only two tea bags) over the past few days.  Diabetes education   Diabetic diet  Continue Accue checks   Continue Hypoglycemia protocol  Patient to follow up with PCP     HTN (hypertension)   Assessment & Plan    Discontinued HCTZ as this may be contributing to patient's syncope.   Patient will be started on Lisinopril 10 mg daily.  Follow up PCP for chronic management of blood pressure.         Consultants:     Neurology    Procedures:        None    Imaging/ Testing:      DX-CERVICAL SPINE-WITH FLEX-EXT   7+   Final Result      1.  Mild degenerative change of lower cervical spine.   2.  No fracture or subluxation.   3.  No evidence for instability.      CT-CTA NECK WITH & W/O-POST PROCESSING   Final Result      CT angiogram of the neck within normal limits.      CT-CTA HEAD WITH & W/O-POST PROCESS   Final Result      1.  CT angiogram of the [place] within normal limits.      2.  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  Minimal white matter change and cerebral volume loss      US-CAROTID DOPPLER BILAT   Final Result      EC-ECHOCARDIOGRAM COMPLETE W/ CONT   Final Result      MR-BRAIN-W/O   Final Result      MRI of the brain without contrast within normal limits.      CT-HEAD W/O   Final Result         1. No acute intracranial abnormality. No evidence of acute intracranial hemorrhage or mass lesion.               US-CAROTID DOPPLER BILAT    (Results Pending)         Discharge Medications:         Medication  Reconciliation: Completed       Medication List      START taking these medications      Instructions   lisinopril 10 MG Tabs  Start taking on:  11/13/2018  Commonly known as:  PRINIVIL   Doctor's comments:  Hold for systolic blood pressure <100 or diastolic blood pressure <60  Take 1 Tab by mouth every day.  Dose:  10 mg        CONTINUE taking these medications      Instructions   acetaminophen 500 MG Tabs  Commonly known as:  TYLENOL   Take 500 mg by mouth every 6 hours as needed.  Dose:  500 mg     docusate sodium 100 MG Caps  Commonly known as:  COLACE   Take 100 mg by mouth 1 time daily as needed for Constipation.  Dose:  100 mg     oxyCODONE immediate-release 5 MG Tabs  Commonly known as:  ROXICODONE   Take 5 mg by mouth every four hours as needed for Severe Pain.  Dose:  5 mg     rivaroxaban 15 MG Tabs tablet  Commonly known as:  XARELTO   Take 1 Tab by mouth 2 Times a Day for 21 days.  Dose:  15 mg     testosterone cypionate 200 MG/ML Soln injection  Commonly known as:  DEPO-TESTOSTERONE   200 mg by Intramuscular route every 10 days.  Dose:  200 mg     Vitamin D 2000 units Caps   Take 1 Cap by mouth every day.  Dose:  1 Cap        STOP taking these medications    lisinopril-hydrochlorothiazide 20-12.5 MG per tablet  Commonly known as:  PRINZIDE, ZESTORETIC            Can use .DISCHARGEMEDSLIST if going to another facility         Disposition:   Patient to be discharged home    Diet:   Diabetic diet    Activity:   As tolerated    Instructions:        The patient was instructed to return to the ER in the event of worsening symptoms. I have counseled the patient on the importance of compliance and the patient has agreed to proceed with all medical recommendations and follow up plan indicated above.   The patient understands that all medications come with benefits and risks. Risks may include permanent injury or death and these risks can be minimized with close reassessment and monitoring.        Primary Care  Provider:    Dr. Candie Broderick MD  Discharge summary faxed to primary care provider:  Completed  Copy of discharge summary given to the patient: Completed      Follow up appointment details :      Patient to follow-up with PCP Dr. Cano in 1 week.  Patient to follow up with neurology in 4-6 weeks.    Pending Studies:        None    Time spent on discharge day patient visit, preparing discharge paperwork and arranging for patient follow up.    Summary of follow up issues:   Attempted to make follow-up appointment however, Dr. Cano only allows patients to call her office.  Patient should have a.m. cortisol testing performed.  Follow-up on blood pressure medication as patient was started on lisinopril 10 mg in the hospital.  Patient to follow-up with neurology in 4-6 weeks or sooner if needed.    Discharge Time (Minutes) :   60 minutes  Hospital Course Type:  Inpatient Stay >2 midnights      Condition on Discharge    Medically Stable for Discharge  ______________________________________________________________________    Interval history/exam for day of discharge:      The patient is a 58 year old male with a history of PE following knee replacement surgery who presented following 5 episodes of syncope after exiting the shower.     No acute overnight events. No syncope overnight.  The patient states that he has not experienced any visual changes overnight.  States that he was able to take a shower without experiencing presyncope or syncope.  The patient denies any nausea or vomiting.  Denies any chest pain or palpitations.     Neurology evaluated the patient.  As part of their workup, they ordered an EEG which was negative.  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  MRI has been negative for acute intracranial abnormalities.  Patient will likely need outpatient follow-up for his recurrent syncopal episodes.     Patient to be discharged with follow-up with  neurology in 4-6 weeks. Patient to follow up with PCP for AM cortisol testing and continued management of blood pressure. We have discontinued the patient's HCTZ as this may be a contributing factor to the patient's syncope. The patient's orthostatic vitals today were negative. We have started the patient on Lisinopril 10 mg daily.     Vitals:     11/11/18 1600 11/11/18 2000 11/12/18 0000 11/12/18 0400   BP: 116/75 141/76 126/78 142/84   Pulse: 73 90 66 69   Resp: 16 16 16 16   Temp: 36.9 °C (98.4 °F) 37.1 °C (98.7 °F) 36.8 °C (98.3 °F) 37.1 °C (98.7 °F)   SpO2: 97% 97% 97% 94%   Weight:           Height:                Body mass index is 34.31 kg/m².    Oxygen Therapy:  Pulse Oximetry: 94 %, O2 (LPM): 0, O2 Delivery: None (Room Air)     Physical Exam   Constitutional: He is oriented to person, place, and time and well-developed, well-nourished, and in no distress. No distress.   HENT:   Head: Normocephalic and atraumatic.   Eyes: Pupils are equal, round, and reactive to light. EOM are normal. Right eye exhibits no discharge. Left eye exhibits no discharge. No scleral icterus.   Neck: Neck supple. No thyromegaly present.   Cardiovascular: Normal rate, regular rhythm and normal heart sounds.  Exam reveals no gallop and no friction rub.    No murmur heard.  Pulmonary/Chest: Effort normal and breath sounds normal. No respiratory distress. He has no wheezes.   Abdominal: Soft. Bowel sounds are normal. He exhibits no distension. There is no tenderness.   Musculoskeletal: Normal range of motion. He exhibits no edema or deformity.   Neurological: He is alert and oriented to person, place, and time. No cranial nerve deficit. Coordination normal.   Reflex Scores:       Bicep reflexes are 2+ on the right side and 2+ on the left side.       Brachioradialis reflexes are 2+ on the right side and 2+ on the left side.       Patellar reflexes are 2+ on the right side and 2+ on the left side.       Achilles reflexes are 2+ on the  right side and 2+ on the left side.  Hand  strength 5/5 bilat.EOM normal.  Strength 5/5 in bilateral quadriceps.  Strength 5/5 in bilateral plantar and dorsiflexion.     Skin: Skin is warm. No rash noted. He is not diaphoretic. No erythema.   Psychiatric: Mood, memory, affect and judgment normal.   Nursing note and vitals reviewed.    Most Recent Labs:    Lab Results   Component Value Date/Time    WBC 8.5 11/11/2018 03:35 AM    RBC 4.81 11/11/2018 03:35 AM    HEMOGLOBIN 13.5 (L) 11/11/2018 03:35 AM    HEMATOCRIT 40.6 (L) 11/11/2018 03:35 AM    MCV 84.4 11/11/2018 03:35 AM    MCH 28.1 11/11/2018 03:35 AM    MCHC 33.3 (L) 11/11/2018 03:35 AM    MPV 10.0 11/11/2018 03:35 AM    NEUTSPOLYS 71.30 11/11/2018 03:35 AM    LYMPHOCYTES 16.90 (L) 11/11/2018 03:35 AM    MONOCYTES 8.60 11/11/2018 03:35 AM    EOSINOPHILS 1.60 11/11/2018 03:35 AM    BASOPHILS 0.40 11/11/2018 03:35 AM      Lab Results   Component Value Date/Time    SODIUM 133 (L) 11/11/2018 03:35 AM    POTASSIUM 4.2 11/11/2018 03:35 AM    CHLORIDE 101 11/11/2018 03:35 AM    CO2 27 11/11/2018 03:35 AM    GLUCOSE 95 11/11/2018 03:35 AM    BUN 22 11/11/2018 03:35 AM    CREATININE 1.09 11/11/2018 03:35 AM      Lab Results   Component Value Date/Time    ALTSGPT 19 11/11/2018 03:35 AM    ASTSGOT 14 11/11/2018 03:35 AM    ALKPHOSPHAT 61 11/11/2018 03:35 AM    TBILIRUBIN 0.7 11/11/2018 03:35 AM    ALBUMIN 3.4 11/11/2018 03:35 AM    GLOBULIN 3.1 11/11/2018 03:35 AM    INR 1.04 11/02/2018 03:43 AM     Lab Results   Component Value Date/Time    PROTHROMBTM 13.7 11/02/2018 03:43 AM    INR 1.04 11/02/2018 03:43 AM

## 2018-11-12 NOTE — PROGRESS NOTES
Internal Medicine Interval Note  Note Author: Sean Rivero D.O.     Name Sumeet Buenrostro     1960   Age/Sex 58 y.o. male   MRN [id number]   Code Status Full     After 5PM or if no immediate response to page, please call for cross-coverage  Attending/Team: Dr. De La Rosa/Juan See Patient List for primary contact information  Call [phone] to page    1st Call - Day Intern (R1):   Dr. Rivero 2nd Call - Day Sr. Resident (R2/R3):   Dr. Sesay       Reason for interval visit  (Principal Problem)   Syncope/Hypertension      Interval Problem Daily Status Update  (24 hours, problem oriented, brief subjective history, new lab/imaging data pertinent to that problem)     The patient is a 58 year old male with a history of PE following knee replacement surgery who presented following 5 episodes of syncope after exiting the shower.    No acute overnight events. No syncope overnight.  The patient states that he has not experienced any visual changes overnight.  States that he was able to take a shower without experiencing presyncope or syncope.  The patient denies any nausea or vomiting.  Denies any chest pain or palpitations.    Neurology evaluated the patient.  As part of their workup, they ordered an EEG which was negative.  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  MRI has been negative for acute intracranial abnormalities.  Patient will likely need outpatient follow-up for his recurrent syncopal episodes.    Patient to be discharged with follow-up with neurology in 4-6 weeks. Patient to follow up with PCP for AM cortisol testing and continued management of blood pressure. We have discontinued the patient's HCTZ as this may be a contributing factor to the patient's syncope. The patient's orthostatic vitals today were negative. We have started the patient on Lisinopril 10 mg daily.    Review of Systems   Constitutional: Negative for chills and  fever.   HENT: Negative for hearing loss and tinnitus.    Eyes: Negative for blurred vision and double vision.   Respiratory: Negative for cough and hemoptysis.    Cardiovascular: Negative for chest pain and palpitations.   Gastrointestinal: Negative for abdominal pain, nausea and vomiting.   Genitourinary: Negative for dysuria and frequency.   Musculoskeletal: Negative for falls, myalgias and neck pain.   Skin: Negative for itching and rash.   Neurological: Negative for dizziness, tingling, tremors, sensory change, speech change, seizures and headaches.   Endo/Heme/Allergies: Does not bruise/bleed easily.   Psychiatric/Behavioral: Negative for depression and suicidal ideas.       Disposition/Barriers to discharge:   None     Consultants/Specialty  None  PCP: Candie Cano M.D.      Quality Measures  Quality-Core Measures   Reviewed items::  Labs reviewed and Medications reviewed  Salazar catheter::  No Salazar  DVT Prophylaxis is Xarelto at this time        Physical Exam       Vitals:    11/11/18 1600 11/11/18 2000 11/12/18 0000 11/12/18 0400   BP: 116/75 141/76 126/78 142/84   Pulse: 73 90 66 69   Resp: 16 16 16 16   Temp: 36.9 °C (98.4 °F) 37.1 °C (98.7 °F) 36.8 °C (98.3 °F) 37.1 °C (98.7 °F)   SpO2: 97% 97% 97% 94%   Weight:       Height:         Body mass index is 34.31 kg/m².    Oxygen Therapy:  Pulse Oximetry: 94 %, O2 (LPM): 0, O2 Delivery: None (Room Air)    Physical Exam   Constitutional: He is oriented to person, place, and time and well-developed, well-nourished, and in no distress. No distress.   HENT:   Head: Normocephalic and atraumatic.   Eyes: Pupils are equal, round, and reactive to light. EOM are normal. Right eye exhibits no discharge. Left eye exhibits no discharge. No scleral icterus.   Neck: Neck supple. No thyromegaly present.   Cardiovascular: Normal rate, regular rhythm and normal heart sounds.  Exam reveals no gallop and no friction rub.    No murmur heard.  Pulmonary/Chest: Effort normal and  breath sounds normal. No respiratory distress. He has no wheezes.   Abdominal: Soft. Bowel sounds are normal. He exhibits no distension. There is no tenderness.   Musculoskeletal: Normal range of motion. He exhibits no edema or deformity.   Neurological: He is alert and oriented to person, place, and time. No cranial nerve deficit. Coordination normal.   Reflex Scores:       Bicep reflexes are 2+ on the right side and 2+ on the left side.       Brachioradialis reflexes are 2+ on the right side and 2+ on the left side.       Patellar reflexes are 2+ on the right side and 2+ on the left side.       Achilles reflexes are 2+ on the right side and 2+ on the left side.  Hand  strength 5/5 bilat.EOM normal.  Strength 5/5 in bilateral quadriceps.  Strength 5/5 in bilateral plantar and dorsiflexion.     Skin: Skin is warm. No rash noted. He is not diaphoretic. No erythema.   Psychiatric: Mood, memory, affect and judgment normal.   Nursing note and vitals reviewed.            Assessment/Plan     * Syncope- (present on admission)   Assessment & Plan    Pt with pulmonary emboli on Xarelto has had 5 syncopal episodes in the shower while sitting down and raising his arms up since his knee replacement on 10/25.  -No postictal state, however the patient does state some associated slurring of speech after episodes of syncope.  -Vertebrobasilar insufficiency is usually associated with pre-syncope.  -Previous echo showed EF of 60, however patient has new symptoms of shortness of breath associated with syncope-warrants new echocardiogram.  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  -Flexion-extension x-ray of cervical spine normal      Patient to be discharged with follow up with neuro in 4-6 weeks.  Patient will follow up with PCP with AM cortisol.  Discontinue HCTZ  outpatient     Pulmonary embolism (HCC)- (present on admission)   Assessment & Plan    Patient came to ED and was diagnosed with PE by CT-PE on November 1st. Had recent orthopedic surgery for right knee replacement on 10/25th.   Patient states he has been taking Xarelto 15mg BID.  Continue Xarelto 15 mg BID     Diabetes (HCC)- (present on admission)   Assessment & Plan    Patient has history of T2DM  A1C 6.7% on 10/12  Pt appears to be unaware of dx.   He drinks gallons of unsweetened tea/day. States that recently he has been taking 2 gallons of tea that is mostly water (only two tea bags) over the past few days.  Diabetes education   Diabetic diet  Continue Accue checks   Continue Hypoglycemia protocol  Patient to follow up with PCP     HTN (hypertension)- (present on admission)   Assessment & Plan    Discontinued HCTZ as this may be contributing to patient's syncope.   Patient will be started on Lisinopril 10 mg daily.  Follow up PCP for chronic management of blood pressure.

## 2018-11-13 ENCOUNTER — PATIENT OUTREACH (OUTPATIENT)
Dept: HEALTH INFORMATION MANAGEMENT | Facility: OTHER | Age: 58
End: 2018-11-13

## 2018-11-13 NOTE — DISCHARGE INSTRUCTIONS
Discharge Instructions    Discharged to home by car with relative. Discharged via wheelchair, hospital escort: Yes.  Special equipment needed: Not Applicable    Be sure to schedule a follow-up appointment with your primary care doctor or any specialists as instructed.     Discharge Plan:   Diet Plan: Discussed  Activity Level: Discussed  Confirmed Follow up Appointment: Patient to Call and Schedule Appointment  Confirmed Symptoms Management: Discussed  Medication Reconciliation Updated: Yes  Pneumococcal Vaccine Administered/Refused: Not given - Patient refused pneumococcal vaccine  Influenza Vaccine Indication: Patient Refuses    I understand that a diet low in cholesterol, fat, and sodium is recommended for good health. Unless I have been given specific instructions below for another diet, I accept this instruction as my diet prescription.   Other diet:     Special Instructions: None    · Is patient discharged on Warfarin / Coumadin?   No     Depression / Suicide Risk    As you are discharged from this [facility] facility, it is important to learn how to keep safe from harming yourself.    Recognize the warning signs:  · Abrupt changes in personality, positive or negative- including increase in energy   · Giving away possessions  · Change in eating patterns- significant weight changes-  positive or negative  · Change in sleeping patterns- unable to sleep or sleeping all the time   · Unwillingness or inability to communicate  · Depression  · Unusual sadness, discouragement and loneliness  · Talk of wanting to die  · Neglect of personal appearance   · Rebelliousness- reckless behavior  · Withdrawal from people/activities they love  · Confusion- inability to concentrate     If you or a loved one observes any of these behaviors or has concerns about self-harm, here's what you can do:  · Talk about it- your feelings and reasons for harming yourself  · Remove any means that you might use to hurt yourself (examples:  pills, rope, extension cords, firearm)  · Get professional help from the community (Mental Health, Substance Abuse, psychological counseling)  · Do not be alone:Call your Safe Contact- someone whom you trust who will be there for you.  · Call your local CRISIS HOTLINE 732-6277 or 574-304-4034  · Call your local Children's Mobile Crisis Response Team [place] (407) 821-0319 or www.pinion-pins  · Call the toll free National Suicide Prevention Hotlines   · National Suicide Prevention Lifeline 286-125-EIAC (7134)  · National Hope Line Network 800-SUICIDE (798-5459)

## 2018-11-13 NOTE — PROGRESS NOTES
RAD Capellan from Ortho doctors office came to patient's room and removed staples from the patient's knee as he could not make it to the doctor's office today since he was here in the hospital. Patient tolerated well. Site was clean dry and intact. Steri strips placed and instructions given to patient how to take care of wound. Open to Air. [facility]

## 2018-12-13 NOTE — PROGRESS NOTES
Anticoagulation Summary  As of 12/14/2018    INR goal:      TTR:   --   Today's INR:   1.3   Warfarin maintenance plan:   No maintenance plan   Next INR check:   1/18/2019   Target end date:       Indications    Pulmonary embolism (HCC) [I26.99]  Deep vein thrombosis (HCC) [I82.409] [I82.409]             Anticoagulation Episode Summary     INR check location:       Preferred lab:       Send INR reminders to:       Comments:   Xarelto       Anticoagulation Care Providers     Provider Role Specialty Phone number    [facility] Responsible  [phone]        Anticoagulation Patient Findings       HPI:  Sumeet Buenrostro referred to the [facility] for anticoagulation therapy with Xarelto for a pulmonary embolism and DVT. He had a a recent knee replacement surgery and subsequently developed a DVT and bilateral PEs as seen on the CT and US from early November.  He has already completed Xarelto 21 mg twice daily for 21 days and is currently taking 20 mg once daily.  He has a follow-up with his primary care next week.  He has no family history of blood clots.  He has never had a blood clot in the past.    Multiple bilateral segmental and subsegmental pulmonary emboli as well as some right lobar emboli seen. No evidence of right heart strain.    Right lower extremity - The paired posterior tibial and peroneal veins are    thrombosed through the calf. The femoral and popliteal vein segments are    free of thrombosis.      HAS-BLED:  Hypertension   Uncontrolled, >160 mmHg systolic- n  Renal disease Dialysis, transplant, Cr >2.26 mg/dL or >200 µmol/L - n  Liver disease Cirrhosis or bilirubin >2x normal with AST/ALT/AP >3x normal- n  Stroke [id number] major bleeding or predisposition to bleeding- n  Labile INR Unstable/high INRs, time in therapeutic range <60%- n  Age >65- n  Medication usage predisposing to bleeding Aspirin, clopidogrel, NSAIDs - n  Alcohol use  ?8 drinks/week- n    Pt gave verbal  consent  to leave a VM with detailed medical information regarding INR and dose of warfarin.    Pt education done for Xarelto     Assessment:  INR  sub-therapeutic.      Pt tolerating medication well, no s/s of bleeding.     Med rec done with pt , no DI     Plan   Continue with Xarelto 20mg once daily     Follow up:  Follow up appointment in 4 week(s) with labs     Jamal Browne, PharmD

## 2018-12-14 ENCOUNTER — ANTICOAGULATION VISIT (OUTPATIENT)
Dept: MEDICAL GROUP | Facility: PHYSICIAN GROUP | Age: 58
End: 2018-12-14
Payer: COMMERCIAL

## 2018-12-14 ENCOUNTER — TELEPHONE (OUTPATIENT)
Dept: VASCULAR LAB | Facility: MEDICAL CENTER | Age: 58
End: 2018-12-14

## 2018-12-14 DIAGNOSIS — I26.99 OTHER ACUTE PULMONARY EMBOLISM WITHOUT ACUTE COR PULMONALE (HCC): ICD-10-CM

## 2018-12-14 PROBLEM — I82.409 DEEP VEIN THROMBOSIS (HCC): Status: ACTIVE | Noted: 2018-12-14

## 2018-12-14 LAB — INR PPP: 1.3 (ref 2–3.5)

## 2018-12-14 PROCEDURE — 85610 PROTHROMBIN TIME: CPT | Performed by: NURSE PRACTITIONER

## 2018-12-14 PROCEDURE — 99211 OFF/OP EST MAY X REQ PHY/QHP: CPT | Performed by: NURSE PRACTITIONER

## 2018-12-14 NOTE — TELEPHONE ENCOUNTER
Initial anticoag note and d/c summary reviewed.    Patient with post-op DVT/PE diagnosed on 11-1 and started on xarelto.    Pending further recs from pcp we will plan on 3 months of anticoag.    Michael Bloch, MD  Anticoagulation Clinic    Cc:      CIARRA Cano

## 2018-12-26 ENCOUNTER — APPOINTMENT (OUTPATIENT)
Dept: ADMISSIONS | Facility: MEDICAL CENTER | Age: 58
End: 2018-12-26
Attending: ORTHOPAEDIC SURGERY
Payer: COMMERCIAL

## 2018-12-26 RX ORDER — LISINOPRIL 2.5 MG/1
2.5 TABLET ORAL DAILY
COMMUNITY
End: 2023-04-10 | Stop reason: CLARIF

## 2018-12-26 RX ORDER — CYCLOBENZAPRINE HCL 10 MG
10 TABLET ORAL 3 TIMES DAILY PRN
COMMUNITY
End: 2019-03-01

## 2018-12-26 NOTE — OR NURSING
Telephone preadmit done. Reviewed npo instructions with pt. Patient instructed to continue regularly prescribed medications through day before surgery.  Instructed to take the following medications the day of surgery with a sip of water per anesthesia protocol: oxycodone prn, cyclobenzaprine prn. Pt will check with dr regarding xarelto.

## 2018-12-28 ENCOUNTER — HOSPITAL ENCOUNTER (OUTPATIENT)
Facility: MEDICAL CENTER | Age: 58
End: 2018-12-28
Attending: ORTHOPAEDIC SURGERY | Admitting: ORTHOPAEDIC SURGERY
Payer: COMMERCIAL

## 2018-12-28 VITALS
DIASTOLIC BLOOD PRESSURE: 97 MMHG | RESPIRATION RATE: 16 BRPM | HEART RATE: 65 BPM | TEMPERATURE: 97.2 F | OXYGEN SATURATION: 97 % | HEIGHT: 75 IN | SYSTOLIC BLOOD PRESSURE: 142 MMHG | BODY MASS INDEX: 31.91 KG/M2 | WEIGHT: 256.62 LBS

## 2018-12-28 DIAGNOSIS — Z96.651 STATUS POST TOTAL RIGHT KNEE REPLACEMENT: ICD-10-CM

## 2018-12-28 LAB
ALBUMIN SERPL BCP-MCNC: 3.8 G/DL (ref 3.2–4.9)
ALBUMIN/GLOB SERPL: 1.3 G/DL
ALP SERPL-CCNC: 72 U/L (ref 30–99)
ALT SERPL-CCNC: 13 U/L (ref 2–50)
ANION GAP SERPL CALC-SCNC: 6 MMOL/L (ref 0–11.9)
AST SERPL-CCNC: 18 U/L (ref 12–45)
BILIRUB SERPL-MCNC: 0.3 MG/DL (ref 0.1–1.5)
BUN SERPL-MCNC: 16 MG/DL (ref 8–22)
CALCIUM SERPL-MCNC: 9.5 MG/DL (ref 8.4–10.2)
CHLORIDE SERPL-SCNC: 102 MMOL/L (ref 96–112)
CO2 SERPL-SCNC: 27 MMOL/L (ref 20–33)
CREAT SERPL-MCNC: 1.13 MG/DL (ref 0.5–1.4)
ERYTHROCYTE [DISTWIDTH] IN BLOOD BY AUTOMATED COUNT: 41.3 FL (ref 35.9–50)
GLOBULIN SER CALC-MCNC: 3 G/DL (ref 1.9–3.5)
GLUCOSE SERPL-MCNC: 104 MG/DL (ref 65–99)
HCT VFR BLD AUTO: 43.4 % (ref 42–52)
HGB BLD-MCNC: 14.2 G/DL (ref 14–18)
MCH RBC QN AUTO: 27.4 PG (ref 27–33)
MCHC RBC AUTO-ENTMCNC: 32.7 G/DL (ref 33.7–35.3)
MCV RBC AUTO: 83.8 FL (ref 81.4–97.8)
PLATELET # BLD AUTO: 238 K/UL (ref 164–446)
PMV BLD AUTO: 10.5 FL (ref 9–12.9)
POTASSIUM SERPL-SCNC: 4.3 MMOL/L (ref 3.6–5.5)
PROT SERPL-MCNC: 6.8 G/DL (ref 6–8.2)
RBC # BLD AUTO: 5.18 M/UL (ref 4.7–6.1)
SODIUM SERPL-SCNC: 135 MMOL/L (ref 135–145)
WBC # BLD AUTO: 6.8 K/UL (ref 4.8–10.8)

## 2018-12-28 PROCEDURE — 160002 HCHG RECOVERY MINUTES (STAT): Performed by: ORTHOPAEDIC SURGERY

## 2018-12-28 PROCEDURE — 85027 COMPLETE CBC AUTOMATED: CPT

## 2018-12-28 PROCEDURE — 160036 HCHG PACU - EA ADDL 30 MINS PHASE I: Performed by: ORTHOPAEDIC SURGERY

## 2018-12-28 PROCEDURE — 160035 HCHG PACU - 1ST 60 MINS PHASE I: Performed by: ORTHOPAEDIC SURGERY

## 2018-12-28 PROCEDURE — 160026 HCHG SURGERY MINUTES - 1ST 30 MINS LEVEL 1: Performed by: ORTHOPAEDIC SURGERY

## 2018-12-28 PROCEDURE — 160048 HCHG OR STATISTICAL LEVEL 1-5: Performed by: ORTHOPAEDIC SURGERY

## 2018-12-28 PROCEDURE — 80053 COMPREHEN METABOLIC PANEL: CPT

## 2018-12-28 PROCEDURE — 160046 HCHG PACU - 1ST 60 MINS PHASE II: Performed by: ORTHOPAEDIC SURGERY

## 2018-12-28 PROCEDURE — 700111 HCHG RX REV CODE 636 W/ 250 OVERRIDE (IP)

## 2018-12-28 PROCEDURE — 700101 HCHG RX REV CODE 250

## 2018-12-28 PROCEDURE — A9270 NON-COVERED ITEM OR SERVICE: HCPCS | Performed by: ANESTHESIOLOGY

## 2018-12-28 PROCEDURE — 160025 RECOVERY II MINUTES (STATS): Performed by: ORTHOPAEDIC SURGERY

## 2018-12-28 PROCEDURE — 160009 HCHG ANES TIME/MIN: Performed by: ORTHOPAEDIC SURGERY

## 2018-12-28 PROCEDURE — 36415 COLL VENOUS BLD VENIPUNCTURE: CPT

## 2018-12-28 PROCEDURE — 700102 HCHG RX REV CODE 250 W/ 637 OVERRIDE(OP): Performed by: ANESTHESIOLOGY

## 2018-12-28 RX ORDER — OXYCODONE HCL 5 MG/5 ML
10 SOLUTION, ORAL ORAL
Status: COMPLETED | OUTPATIENT
Start: 2018-12-28 | End: 2018-12-28

## 2018-12-28 RX ORDER — ONDANSETRON 4 MG/1
4 TABLET, FILM COATED ORAL EVERY 4 HOURS PRN
Qty: 20 TAB | Refills: 0 | Status: SHIPPED | OUTPATIENT
Start: 2018-12-28 | End: 2019-01-17

## 2018-12-28 RX ORDER — OXYCODONE HCL 5 MG/5 ML
5 SOLUTION, ORAL ORAL
Status: COMPLETED | OUTPATIENT
Start: 2018-12-28 | End: 2018-12-28

## 2018-12-28 RX ORDER — HYDROMORPHONE HYDROCHLORIDE 1 MG/ML
0.2 INJECTION, SOLUTION INTRAMUSCULAR; INTRAVENOUS; SUBCUTANEOUS
Status: DISCONTINUED | OUTPATIENT
Start: 2018-12-28 | End: 2018-12-28 | Stop reason: HOSPADM

## 2018-12-28 RX ORDER — HYDROMORPHONE HYDROCHLORIDE 1 MG/ML
0.4 INJECTION, SOLUTION INTRAMUSCULAR; INTRAVENOUS; SUBCUTANEOUS
Status: DISCONTINUED | OUTPATIENT
Start: 2018-12-28 | End: 2018-12-28 | Stop reason: HOSPADM

## 2018-12-28 RX ORDER — HYDRALAZINE HYDROCHLORIDE 20 MG/ML
5 INJECTION INTRAMUSCULAR; INTRAVENOUS
Status: DISCONTINUED | OUTPATIENT
Start: 2018-12-28 | End: 2018-12-28 | Stop reason: HOSPADM

## 2018-12-28 RX ORDER — DIPHENHYDRAMINE HYDROCHLORIDE 50 MG/ML
12.5 INJECTION INTRAMUSCULAR; INTRAVENOUS
Status: DISCONTINUED | OUTPATIENT
Start: 2018-12-28 | End: 2018-12-28 | Stop reason: HOSPADM

## 2018-12-28 RX ORDER — SODIUM CHLORIDE, SODIUM LACTATE, POTASSIUM CHLORIDE, CALCIUM CHLORIDE 600; 310; 30; 20 MG/100ML; MG/100ML; MG/100ML; MG/100ML
INJECTION, SOLUTION INTRAVENOUS CONTINUOUS
Status: DISCONTINUED | OUTPATIENT
Start: 2018-12-28 | End: 2018-12-28 | Stop reason: HOSPADM

## 2018-12-28 RX ORDER — HALOPERIDOL 5 MG/ML
1 INJECTION INTRAMUSCULAR
Status: DISCONTINUED | OUTPATIENT
Start: 2018-12-28 | End: 2018-12-28 | Stop reason: HOSPADM

## 2018-12-28 RX ORDER — BUPIVACAINE HYDROCHLORIDE 5 MG/ML
INJECTION, SOLUTION EPIDURAL; INTRACAUDAL
Status: DISCONTINUED | OUTPATIENT
Start: 2018-12-28 | End: 2018-12-28 | Stop reason: HOSPADM

## 2018-12-28 RX ORDER — MEPERIDINE HYDROCHLORIDE 25 MG/ML
12.5 INJECTION INTRAMUSCULAR; INTRAVENOUS; SUBCUTANEOUS
Status: DISCONTINUED | OUTPATIENT
Start: 2018-12-28 | End: 2018-12-28 | Stop reason: HOSPADM

## 2018-12-28 RX ORDER — ONDANSETRON 2 MG/ML
4 INJECTION INTRAMUSCULAR; INTRAVENOUS
Status: DISCONTINUED | OUTPATIENT
Start: 2018-12-28 | End: 2018-12-28 | Stop reason: HOSPADM

## 2018-12-28 RX ORDER — METOPROLOL TARTRATE 1 MG/ML
1 INJECTION, SOLUTION INTRAVENOUS
Status: DISCONTINUED | OUTPATIENT
Start: 2018-12-28 | End: 2018-12-28 | Stop reason: HOSPADM

## 2018-12-28 RX ORDER — OXYCODONE HYDROCHLORIDE 5 MG/1
5 TABLET ORAL EVERY 4 HOURS PRN
Qty: 42 TAB | Refills: 0 | Status: SHIPPED | OUTPATIENT
Start: 2018-12-28 | End: 2019-01-04

## 2018-12-28 RX ORDER — HYDROMORPHONE HYDROCHLORIDE 1 MG/ML
0.1 INJECTION, SOLUTION INTRAMUSCULAR; INTRAVENOUS; SUBCUTANEOUS
Status: DISCONTINUED | OUTPATIENT
Start: 2018-12-28 | End: 2018-12-28 | Stop reason: HOSPADM

## 2018-12-28 RX ORDER — MIDAZOLAM HYDROCHLORIDE 1 MG/ML
1 INJECTION INTRAMUSCULAR; INTRAVENOUS
Status: DISCONTINUED | OUTPATIENT
Start: 2018-12-28 | End: 2018-12-28 | Stop reason: HOSPADM

## 2018-12-28 RX ORDER — SODIUM CHLORIDE, SODIUM LACTATE, POTASSIUM CHLORIDE, CALCIUM CHLORIDE 600; 310; 30; 20 MG/100ML; MG/100ML; MG/100ML; MG/100ML
1000 INJECTION, SOLUTION INTRAVENOUS
Status: COMPLETED | OUTPATIENT
Start: 2018-12-28 | End: 2018-12-28

## 2018-12-28 RX ADMIN — FENTANYL CITRATE 25 MCG: 50 INJECTION, SOLUTION INTRAMUSCULAR; INTRAVENOUS at 07:23

## 2018-12-28 RX ADMIN — SODIUM CHLORIDE, SODIUM LACTATE, POTASSIUM CHLORIDE, CALCIUM CHLORIDE: 600; 310; 30; 20 INJECTION, SOLUTION INTRAVENOUS at 07:30

## 2018-12-28 RX ADMIN — SODIUM CHLORIDE, SODIUM LACTATE, POTASSIUM CHLORIDE, CALCIUM CHLORIDE 1000 ML: 600; 310; 30; 20 INJECTION, SOLUTION INTRAVENOUS at 05:53

## 2018-12-28 RX ADMIN — OXYCODONE HYDROCHLORIDE 5 MG: 5 SOLUTION ORAL at 07:38

## 2018-12-28 ASSESSMENT — PAIN SCALES - GENERAL
PAINLEVEL_OUTOF10: 4
PAINLEVEL_OUTOF10: 4
PAINLEVEL_OUTOF10: 1
PAINLEVEL_OUTOF10: 5
PAINLEVEL_OUTOF10: 0
PAINLEVEL_OUTOF10: 0
PAINLEVEL_OUTOF10: 2
PAINLEVEL_OUTOF10: 5
PAINLEVEL_OUTOF10: 1

## 2018-12-28 NOTE — OP REPORT
DATE OF SERVICE:  12/28/2018    SURGEON:  Jeffry Christopher MD    PREOPERATIVE DIAGNOSES:  1.  Right knee arthrofibrosis, status post right total knee arthroplasty.  2.  Left knee osteoarthritis.    POSTOPERATIVE DIAGNOSES:  1.  Right knee arthrofibrosis, status post right total knee arthroplasty.  2.  Left knee osteoarthritis.    PROCEDURES PERFORMED:  1.  Right knee manipulation under anesthesia.  2.  Left knee intra-articular corticosteroid injection.    INDICATIONS:  Treat right knee stiffness, improve pain, and improve function.    ANESTHESIA:  General anesthesia with single shot adductor canal block.    HISTORY OF PRESENT ILLNESS:  This is a very pleasant and active 58-year-old   male who underwent a right total knee arthroplasty by myself approximately 2   months ago.  The patient struggled with range of motions since the time of the   operation.  As a result, we elected to proceed with a manipulation under   anesthesia.  He has been n.p.o. since midnight.  He is medically cleared for   surgery by the anesthesia team.    INFORMED CONSENT:  The patient was informed of the risks, benefits, and   alternatives to the planned operation.  The risks include, but not limited to   bleeding, infection, neurovascular damage, pain, stiffness, fracture, extensor   mechanism rupture, DVT, PE, MI, stroke, and death.  Advanced directives were   reviewed.  After answering all questions, the patient elected to proceed with   the planned operation and an informed consent form was signed.     IMPLANTS:  None.    DESCRIPTION OF PROCEDURE:  The patient was identified in the preoperative   holding area.  The correct procedural side and site were identified and   marked.  Patient was brought to the operating room and transferred to   operating room table.  All bony prominences were well padded.  He received a   single shot adductor canal block under ultrasound guidance by the anesthesia   team followed by general  anesthesia.    Examination of the right knee demonstrated well-healed incision over the   anterior part of the joint.  Range of motion was from about 1-2 degrees short   of full extension to about 85 degrees of flexion.  The knee was stable on   exam.    I first cleaned the left knee with several alcohol-soaked gauzes and then   performed an intraarticular injection of local anesthetic and corticosteroid   injection via lateral parapatellar injection site.  A simple dressing/Band-Aid   was applied.    I then turned my attention to the right knee.  Using gentle pressure, I   carefully bent the knee to about 135 degrees and was able to palpably feel   some of the scar tissue break apart.  I then held in position in about 30-45   seconds.  I then carefully released and at this point in time noted range of   motion from 0 to about 135 degrees of flexion.  With the hip flexed 90 degrees   and the knee resting comfortably, it rested was about 115/120 degrees.  We   then ranged the knee a few more times. Patient was then extubated by the   anesthesia team and transferred off the operating room table onto the regular   hospital bed.    ESTIMATED BLOOD LOSS:  None.    COMPLICATIONS:  None.    TOURNIQUET TIME:  None.    SPECIMENS:  None.    WOUND TYPE:  None.    POSTOPERATIVE PLAN:  The patient will be transferred back to the postoperative   unit.  I expect he will be discharged from the hospital this morning once   mobilizing safely and tolerating medications.  The patient will continue to   use the CPM machine later on today.  The patient has an outpatient physical   therapy appointment setup for later today as well.  The patient is on Xarelto   for treatment of a postoperative DVT and PE.  As a result, we cannot have him   take any sort of anti-inflammatory medicines.  We will see him back in about 1   week.       ____________________________________     MD HARISH Rust / RUY    DD:  12/28/2018  07:12:07  DT:  12/28/2018 07:39:11    D#:  [id number]  Job#:  [id number]

## 2018-12-28 NOTE — OR NURSING
0815 Pt arrival to stage II, denies pain and nausea surgical site intact, CDI.  0820 Pt family at bedside    0830 Provided a copy and educated pt and family on discharge instructions.  0835 Pt discharged to care of family post uneventful stay in PACU II.

## 2018-12-28 NOTE — OR NURSING
0711- Patient admitted to PACU from the operating room following a right knee manipulation under general anesthesia. Oral airway in place upon return to recovery room.     0712- Oral airway discontinued. Patient placed on oxygen infusing via nasal cannula. Respirations noted as even and unlabored.     0723- Fentanyl 25 micrograms administered intravenously for patient complaints of pain to right knee as charted in medication administration record.     0738- Oral pain medication (oxycodone 5 mg) given in addition to intravenous pain medication for patient complaints of right knee discomfort. Patient is able to dorsiflex and plantar flex right lower extremity with 2+ pulse noted to patient dorsalis pedis pulse and 1+ pulse noted to patient posterior tibialis. Right knee noted with band aid in place. No surgical incision noted.    0751- Patient resting in bed. SPO2 noted at 96% on room air. Patient resting in bed.     0805- Patient denies any pain or nausea at this time.     0815- Report given to stage II and patient transferred.

## 2018-12-28 NOTE — DISCHARGE INSTRUCTIONS
ACTIVITY: Rest and take it easy for the first 24 hours.  A responsible adult is recommended to remain with you during that time.  It is normal to feel sleepy.  We encourage you to not do anything that requires balance, judgment or coordination.    MILD FLU-LIKE SYMPTOMS ARE NORMAL. YOU MAY EXPERIENCE GENERALIZED MUSCLE ACHES, THROAT IRRITATION, HEADACHE AND/OR SOME NAUSEA.    FOR 24 HOURS DO NOT:  Drive, operate machinery or run household appliances.  Drink beer or alcoholic beverages.   Make important decisions or sign legal documents.    SPECIAL INSTRUCTIONS: Weightbearing as tolerated right lower extremity BE CAREFUL with moving secondary to your nerve block.  Right knee range of motion as tolerated.  Continue CPM machine at home this afternoon(continue therapy)    DIET: To avoid nausea, slowly advance diet as tolerated, avoiding spicy or greasy foods for the first day.  Add more substantial food to your diet according to your physician's instructions.  Babies can be fed formula or breast milk as soon as they are hungry.  INCREASE FLUIDS AND FIBER TO AVOID CONSTIPATION.    SURGICAL DRESSING/BATHING: none    FOLLOW-UP APPOINTMENT:  A follow-up appointment should be arranged with your doctor in 1 week; call to schedule if appointment not already made.    You should CALL YOUR PHYSICIAN if you develop:  Fever greater than 101 degrees F.  Pain not relieved by medication, or persistent nausea or vomiting.  Excessive bleeding (blood soaking through dressing) or unexpected drainage from the wound.  Extreme redness or swelling around the incision site, drainage of pus or foul smelling drainage.  Inability to urinate or empty your bladder within 8 hours.  Problems with breathing or chest pain.    You should call 911 if you develop problems with breathing or chest pain.  If you are unable to contact your doctor or surgical center, you should go to the nearest emergency room or urgent care center.  Physician's telephone #:  [phone] Dr. Christopher    If any questions arise, call your doctor.  If your doctor is not available, please feel free to call the Surgical Center at [phone].  The Center is open Monday through Friday from 7AM to 7PM.  You can also call the HEALTH HOTLINE open 24 hours/day, 7 days/week and speak to a nurse at [phone], or toll free at [phone].    A registered nurse may call you a few days after your surgery to see how you are doing after your procedure.    MEDICATIONS: Resume taking daily medication.  Take prescribed pain medication with food.  If no medication is prescribed, you may take non-aspirin pain medication if needed.  PAIN MEDICATION CAN BE VERY CONSTIPATING.  Take a stool softener or laxative such as senokot, pericolace, or milk of magnesia if needed.    Prescription given for pain (Oxycodone) and nausea (Zofran).  Last pain medication given at 07:38 am.    If your physician has prescribed pain medication that includes Acetaminophen (Tylenol), do not take additional Acetaminophen (Tylenol) while taking the prescribed medication.    Depression / Suicide Risk    As you are discharged from this [facility] facility, it is important to learn how to keep safe from harming yourself.    Recognize the warning signs:  · Abrupt changes in personality, positive or negative- including increase in energy   · Giving away possessions  · Change in eating patterns- significant weight changes-  positive or negative  · Change in sleeping patterns- unable to sleep or sleeping all the time   · Unwillingness or inability to communicate  · Depression  · Unusual sadness, discouragement and loneliness  · Talk of wanting to die  · Neglect of personal appearance   · Rebelliousness- reckless behavior  · Withdrawal from people/activities they love  · Confusion- inability to concentrate     If you or a loved one observes any of these behaviors or has concerns about self-harm, here's what you can do:  · Talk  about it- your feelings and reasons for harming yourself  · Remove any means that you might use to hurt yourself (examples: pills, rope, extension cords, firearm)  · Get professional help from the community (Mental Health, Substance Abuse, psychological counseling)  · Do not be alone:Call your Safe Contact- someone whom you trust who will be there for you.  · Call your local CRISIS HOTLINE 113-9488 or 524-319-4079  · Call your local Children's Mobile Crisis Response Team [place] (319) 201-0592 or [employer]  · Call the toll free National Suicide Prevention Hotlines   · National Suicide Prevention Lifeline 194-656-EUZL (0681)  National [employer] Network 800-SUICIDE (686-4218)    Peripheral Nerve Block Discharge Instructions from Same Day Surgery and Inpatient :    What to Expect - Lower Extremity  · The block may cause you to experience numbness and weakness in your thigh and knee on the same side as your surgery  · Numbness, tingling and / or weakness are all normal. For some people, this may be an unpleasant sensation  · These issues will be resolved when the local anesthetic wears off   · You may experience numbness and tingling in your thigh on the same side as your surgery if the block medicine was injected at your groin area  · Numbness will make it difficult to walk  · You may have problems with balance and walking so be very careful   · Follow your surgeon's direction regarding weight bearing on your surgical limb  · Be very careful with your numb limb  Precautions  · The numbness may affect your balance  · Be careful when walking or moving around  · Your leg may be weak: be very careful putting weight on it  · If your surgeon did not specify a time, you should not bear weight for 24 hours  · Be sure to ask for help when you need it  · It is better to have help than to fall and hurt yourself  ·

## 2019-01-28 ENCOUNTER — HOSPITAL ENCOUNTER (OUTPATIENT)
Dept: RADIOLOGY | Facility: MEDICAL CENTER | Age: 59
End: 2019-01-28
Attending: FAMILY MEDICINE
Payer: COMMERCIAL

## 2019-01-28 DIAGNOSIS — T81.718A IATROGENIC PULMONARY EMBOLISM AND INFARCTION, INITIAL ENCOUNTER (HCC): ICD-10-CM

## 2019-01-28 DIAGNOSIS — I26.99 IATROGENIC PULMONARY EMBOLISM AND INFARCTION, INITIAL ENCOUNTER (HCC): ICD-10-CM

## 2019-01-28 DIAGNOSIS — I82.401 ACUTE DEEP VEIN THROMBOSIS (DVT) OF RIGHT LOWER EXTREMITY, UNSPECIFIED VEIN (HCC): ICD-10-CM

## 2019-01-28 PROCEDURE — 93971 EXTREMITY STUDY: CPT | Mod: RT

## 2019-01-28 PROCEDURE — 71275 CT ANGIOGRAPHY CHEST: CPT

## 2019-01-31 LAB — EKG IMPRESSION: NORMAL

## 2019-03-01 DIAGNOSIS — Z01.812 PRE-OPERATIVE LABORATORY EXAMINATION: ICD-10-CM

## 2019-03-01 LAB
ANION GAP SERPL CALC-SCNC: 4 MMOL/L (ref 0–11.9)
APTT PPP: 28.4 SEC (ref 24.7–36)
BUN SERPL-MCNC: 17 MG/DL (ref 8–22)
CALCIUM SERPL-MCNC: 10.1 MG/DL (ref 8.5–10.5)
CHLORIDE SERPL-SCNC: 104 MMOL/L (ref 96–112)
CO2 SERPL-SCNC: 29 MMOL/L (ref 20–33)
CREAT SERPL-MCNC: 1.05 MG/DL (ref 0.5–1.4)
ERYTHROCYTE [DISTWIDTH] IN BLOOD BY AUTOMATED COUNT: 47.8 FL (ref 35.9–50)
GLUCOSE SERPL-MCNC: 90 MG/DL (ref 65–99)
HCT VFR BLD AUTO: 42.4 % (ref 42–52)
HGB BLD-MCNC: 13.7 G/DL (ref 14–18)
INR PPP: 1.01 (ref 0.87–1.13)
MCH RBC QN AUTO: 27.6 PG (ref 27–33)
MCHC RBC AUTO-ENTMCNC: 32.3 G/DL (ref 33.7–35.3)
MCV RBC AUTO: 85.5 FL (ref 81.4–97.8)
PLATELET # BLD AUTO: 231 K/UL (ref 164–446)
PMV BLD AUTO: 12.5 FL (ref 9–12.9)
POTASSIUM SERPL-SCNC: 4.2 MMOL/L (ref 3.6–5.5)
PROTHROMBIN TIME: 13.4 SEC (ref 12–14.6)
RBC # BLD AUTO: 4.96 M/UL (ref 4.7–6.1)
SODIUM SERPL-SCNC: 137 MMOL/L (ref 135–145)
WBC # BLD AUTO: 6.5 K/UL (ref 4.8–10.8)

## 2019-03-01 PROCEDURE — 85730 THROMBOPLASTIN TIME PARTIAL: CPT

## 2019-03-01 PROCEDURE — 80048 BASIC METABOLIC PNL TOTAL CA: CPT

## 2019-03-01 PROCEDURE — 87640 STAPH A DNA AMP PROBE: CPT

## 2019-03-01 PROCEDURE — 85027 COMPLETE CBC AUTOMATED: CPT

## 2019-03-01 PROCEDURE — 83036 HEMOGLOBIN GLYCOSYLATED A1C: CPT

## 2019-03-01 PROCEDURE — 87641 MR-STAPH DNA AMP PROBE: CPT

## 2019-03-01 PROCEDURE — 85610 PROTHROMBIN TIME: CPT

## 2019-03-01 PROCEDURE — 36415 COLL VENOUS BLD VENIPUNCTURE: CPT

## 2019-03-01 NOTE — DISCHARGE PLANNING
DISCHARGE PLANNING NOTE - TOTAL JOINT     Procedure: Procedure(s):  KNEE REVISION TOTAL- FOR OPEN LYSIS OF ADHESIONS, POLYETHYLENE EXCHANGE  Procedure Date: 3/4/2019  Insurance:  Payor: [employer] / Plan: [employer] / Product Type: *No Product type* /   Equipment currently available at home? cane, crutches, front-wheel walker, shower chair and reacher  Steps into the home? 2 small  Steps within the home? none  Toilet height? ADA  Type of shower? walk-in shower with hose  Who will be with you during your recovery? Spouse-Ivanna  Is Outpatient Physical Therapy set up after surgery? No   Did you take the Total Joint Class and where? Yes, at [facility] in December     Plan: Met with patient during preadmission appointment.  Reviewed Equipment Resource list and provided a copy to the patient.  Provided and reviewed Home Safety Checklist.  Quiet Hours information given and reviewed.  There are no identified discharge needs at this time.  Anticipate discharge home with family.  No Dme ordered/needed at this time.

## 2019-03-02 LAB
EST. AVERAGE GLUCOSE BLD GHB EST-MCNC: 128 MG/DL
HBA1C MFR BLD: 6.1 % (ref 0–5.6)
SCCMEC + MECA PNL NOSE NAA+PROBE: NEGATIVE
SCCMEC + MECA PNL NOSE NAA+PROBE: NEGATIVE

## 2019-03-04 ENCOUNTER — HOSPITAL ENCOUNTER (OUTPATIENT)
Facility: MEDICAL CENTER | Age: 59
End: 2019-03-05
Attending: ORTHOPAEDIC SURGERY | Admitting: ORTHOPAEDIC SURGERY
Payer: COMMERCIAL

## 2019-03-04 DIAGNOSIS — Z96.651 STATUS POST TOTAL RIGHT KNEE REPLACEMENT: ICD-10-CM

## 2019-03-04 PROCEDURE — 160029 HCHG SURGERY MINUTES - 1ST 30 MINS LEVEL 4: Performed by: ORTHOPAEDIC SURGERY

## 2019-03-04 PROCEDURE — 160022 HCHG BLOCK: Performed by: ORTHOPAEDIC SURGERY

## 2019-03-04 PROCEDURE — 500440 HCHG DRESSING, STERILE ROLL (KERLIX): Performed by: ORTHOPAEDIC SURGERY

## 2019-03-04 PROCEDURE — 700111 HCHG RX REV CODE 636 W/ 250 OVERRIDE (IP)

## 2019-03-04 PROCEDURE — G0378 HOSPITAL OBSERVATION PER HR: HCPCS

## 2019-03-04 PROCEDURE — 160035 HCHG PACU - 1ST 60 MINS PHASE I: Performed by: ORTHOPAEDIC SURGERY

## 2019-03-04 PROCEDURE — 502579 HCHG PACK, TOTAL KNEE: Performed by: ORTHOPAEDIC SURGERY

## 2019-03-04 PROCEDURE — A9270 NON-COVERED ITEM OR SERVICE: HCPCS | Performed by: ORTHOPAEDIC SURGERY

## 2019-03-04 PROCEDURE — 700102 HCHG RX REV CODE 250 W/ 637 OVERRIDE(OP): Performed by: ORTHOPAEDIC SURGERY

## 2019-03-04 PROCEDURE — 700101 HCHG RX REV CODE 250

## 2019-03-04 PROCEDURE — 160002 HCHG RECOVERY MINUTES (STAT): Performed by: ORTHOPAEDIC SURGERY

## 2019-03-04 PROCEDURE — 160041 HCHG SURGERY MINUTES - EA ADDL 1 MIN LEVEL 4: Performed by: ORTHOPAEDIC SURGERY

## 2019-03-04 PROCEDURE — 501838 HCHG SUTURE GENERAL: Performed by: ORTHOPAEDIC SURGERY

## 2019-03-04 PROCEDURE — 700112 HCHG RX REV CODE 229: Performed by: ORTHOPAEDIC SURGERY

## 2019-03-04 PROCEDURE — 500903 HCHG PAD, UNIVERSAL KNEE POSITIONER: Performed by: ORTHOPAEDIC SURGERY

## 2019-03-04 PROCEDURE — 500864 HCHG NEEDLE, SPINAL 18G: Performed by: ORTHOPAEDIC SURGERY

## 2019-03-04 PROCEDURE — 160048 HCHG OR STATISTICAL LEVEL 1-5: Performed by: ORTHOPAEDIC SURGERY

## 2019-03-04 PROCEDURE — 700101 HCHG RX REV CODE 250: Performed by: ANESTHESIOLOGY

## 2019-03-04 PROCEDURE — 700105 HCHG RX REV CODE 258: Performed by: ORTHOPAEDIC SURGERY

## 2019-03-04 PROCEDURE — 94760 N-INVAS EAR/PLS OXIMETRY 1: CPT

## 2019-03-04 PROCEDURE — 160009 HCHG ANES TIME/MIN: Performed by: ORTHOPAEDIC SURGERY

## 2019-03-04 RX ORDER — AMOXICILLIN 250 MG
1 CAPSULE ORAL NIGHTLY
Status: DISCONTINUED | OUTPATIENT
Start: 2019-03-04 | End: 2019-03-05 | Stop reason: HOSPADM

## 2019-03-04 RX ORDER — BUPIVACAINE HYDROCHLORIDE 5 MG/ML
INJECTION, SOLUTION EPIDURAL; INTRACAUDAL
Status: COMPLETED
Start: 2019-03-04 | End: 2019-03-04

## 2019-03-04 RX ORDER — MIDAZOLAM HYDROCHLORIDE 1 MG/ML
1 INJECTION INTRAMUSCULAR; INTRAVENOUS
Status: DISCONTINUED | OUTPATIENT
Start: 2019-03-04 | End: 2019-03-04 | Stop reason: HOSPADM

## 2019-03-04 RX ORDER — HYDROMORPHONE HYDROCHLORIDE 1 MG/ML
0.4 INJECTION, SOLUTION INTRAMUSCULAR; INTRAVENOUS; SUBCUTANEOUS
Status: DISCONTINUED | OUTPATIENT
Start: 2019-03-04 | End: 2019-03-04 | Stop reason: HOSPADM

## 2019-03-04 RX ORDER — OXYCODONE HYDROCHLORIDE 5 MG/1
5 TABLET ORAL
Status: DISCONTINUED | OUTPATIENT
Start: 2019-03-04 | End: 2019-03-05 | Stop reason: HOSPADM

## 2019-03-04 RX ORDER — LIDOCAINE HYDROCHLORIDE 10 MG/ML
INJECTION, SOLUTION INFILTRATION; PERINEURAL
Status: COMPLETED
Start: 2019-03-04 | End: 2019-03-04

## 2019-03-04 RX ORDER — OXYCODONE HYDROCHLORIDE AND ACETAMINOPHEN 5; 325 MG/1; MG/1
1 TABLET ORAL
Status: DISCONTINUED | OUTPATIENT
Start: 2019-03-04 | End: 2019-03-04 | Stop reason: HOSPADM

## 2019-03-04 RX ORDER — AMOXICILLIN 250 MG
1 CAPSULE ORAL
Status: DISCONTINUED | OUTPATIENT
Start: 2019-03-04 | End: 2019-03-05 | Stop reason: HOSPADM

## 2019-03-04 RX ORDER — SODIUM CHLORIDE, SODIUM LACTATE, POTASSIUM CHLORIDE, CALCIUM CHLORIDE 600; 310; 30; 20 MG/100ML; MG/100ML; MG/100ML; MG/100ML
INJECTION, SOLUTION INTRAVENOUS CONTINUOUS
Status: DISCONTINUED | OUTPATIENT
Start: 2019-03-04 | End: 2019-03-04 | Stop reason: HOSPADM

## 2019-03-04 RX ORDER — MEPERIDINE HYDROCHLORIDE 25 MG/ML
12.5 INJECTION INTRAMUSCULAR; INTRAVENOUS; SUBCUTANEOUS
Status: DISCONTINUED | OUTPATIENT
Start: 2019-03-04 | End: 2019-03-04 | Stop reason: HOSPADM

## 2019-03-04 RX ORDER — ACETAMINOPHEN 500 MG
1000 TABLET ORAL EVERY 6 HOURS
Status: DISCONTINUED | OUTPATIENT
Start: 2019-03-04 | End: 2019-03-05 | Stop reason: HOSPADM

## 2019-03-04 RX ORDER — HALOPERIDOL 5 MG/ML
1 INJECTION INTRAMUSCULAR EVERY 6 HOURS PRN
Status: DISCONTINUED | OUTPATIENT
Start: 2019-03-04 | End: 2019-03-05 | Stop reason: HOSPADM

## 2019-03-04 RX ORDER — DEXAMETHASONE SODIUM PHOSPHATE 4 MG/ML
4 INJECTION, SOLUTION INTRA-ARTICULAR; INTRALESIONAL; INTRAMUSCULAR; INTRAVENOUS; SOFT TISSUE
Status: DISCONTINUED | OUTPATIENT
Start: 2019-03-04 | End: 2019-03-05 | Stop reason: HOSPADM

## 2019-03-04 RX ORDER — BISACODYL 10 MG
10 SUPPOSITORY, RECTAL RECTAL
Status: DISCONTINUED | OUTPATIENT
Start: 2019-03-04 | End: 2019-03-05 | Stop reason: HOSPADM

## 2019-03-04 RX ORDER — HYDROMORPHONE HYDROCHLORIDE 1 MG/ML
0.2 INJECTION, SOLUTION INTRAMUSCULAR; INTRAVENOUS; SUBCUTANEOUS
Status: DISCONTINUED | OUTPATIENT
Start: 2019-03-04 | End: 2019-03-04 | Stop reason: HOSPADM

## 2019-03-04 RX ORDER — SODIUM CHLORIDE, SODIUM LACTATE, POTASSIUM CHLORIDE, CALCIUM CHLORIDE 600; 310; 30; 20 MG/100ML; MG/100ML; MG/100ML; MG/100ML
INJECTION, SOLUTION INTRAVENOUS ONCE
Status: COMPLETED | OUTPATIENT
Start: 2019-03-04 | End: 2019-03-04

## 2019-03-04 RX ORDER — DIPHENHYDRAMINE HYDROCHLORIDE 50 MG/ML
25 INJECTION INTRAMUSCULAR; INTRAVENOUS EVERY 6 HOURS PRN
Status: DISCONTINUED | OUTPATIENT
Start: 2019-03-04 | End: 2019-03-05 | Stop reason: HOSPADM

## 2019-03-04 RX ORDER — DIPHENHYDRAMINE HYDROCHLORIDE 50 MG/ML
12.5 INJECTION INTRAMUSCULAR; INTRAVENOUS
Status: DISCONTINUED | OUTPATIENT
Start: 2019-03-04 | End: 2019-03-04 | Stop reason: HOSPADM

## 2019-03-04 RX ORDER — KETOROLAC TROMETHAMINE 30 MG/ML
INJECTION, SOLUTION INTRAMUSCULAR; INTRAVENOUS
Status: DISCONTINUED | OUTPATIENT
Start: 2019-03-04 | End: 2019-03-04 | Stop reason: HOSPADM

## 2019-03-04 RX ORDER — POLYETHYLENE GLYCOL 3350 17 G/17G
1 POWDER, FOR SOLUTION ORAL 2 TIMES DAILY PRN
Status: DISCONTINUED | OUTPATIENT
Start: 2019-03-04 | End: 2019-03-05 | Stop reason: HOSPADM

## 2019-03-04 RX ORDER — TRAMADOL HYDROCHLORIDE 50 MG/1
50 TABLET ORAL EVERY 4 HOURS PRN
Status: DISCONTINUED | OUTPATIENT
Start: 2019-03-04 | End: 2019-03-05 | Stop reason: HOSPADM

## 2019-03-04 RX ORDER — MORPHINE SULFATE 4 MG/ML
4 INJECTION, SOLUTION INTRAMUSCULAR; INTRAVENOUS
Status: DISCONTINUED | OUTPATIENT
Start: 2019-03-04 | End: 2019-03-05 | Stop reason: HOSPADM

## 2019-03-04 RX ORDER — HALOPERIDOL 5 MG/ML
1 INJECTION INTRAMUSCULAR
Status: DISCONTINUED | OUTPATIENT
Start: 2019-03-04 | End: 2019-03-04 | Stop reason: HOSPADM

## 2019-03-04 RX ORDER — METOPROLOL TARTRATE 1 MG/ML
1 INJECTION, SOLUTION INTRAVENOUS
Status: DISCONTINUED | OUTPATIENT
Start: 2019-03-04 | End: 2019-03-04 | Stop reason: HOSPADM

## 2019-03-04 RX ORDER — OXYCODONE HYDROCHLORIDE 10 MG/1
10 TABLET ORAL
Status: DISCONTINUED | OUTPATIENT
Start: 2019-03-04 | End: 2019-03-05 | Stop reason: HOSPADM

## 2019-03-04 RX ORDER — HYDROMORPHONE HYDROCHLORIDE 1 MG/ML
0.1 INJECTION, SOLUTION INTRAMUSCULAR; INTRAVENOUS; SUBCUTANEOUS
Status: DISCONTINUED | OUTPATIENT
Start: 2019-03-04 | End: 2019-03-04 | Stop reason: HOSPADM

## 2019-03-04 RX ORDER — OXYCODONE HYDROCHLORIDE AND ACETAMINOPHEN 5; 325 MG/1; MG/1
2 TABLET ORAL
Status: DISCONTINUED | OUTPATIENT
Start: 2019-03-04 | End: 2019-03-04 | Stop reason: HOSPADM

## 2019-03-04 RX ORDER — DOCUSATE SODIUM 100 MG/1
100 CAPSULE, LIQUID FILLED ORAL 2 TIMES DAILY
Status: DISCONTINUED | OUTPATIENT
Start: 2019-03-04 | End: 2019-03-05 | Stop reason: HOSPADM

## 2019-03-04 RX ORDER — SCOLOPAMINE TRANSDERMAL SYSTEM 1 MG/1
1 PATCH, EXTENDED RELEASE TRANSDERMAL
Status: DISCONTINUED | OUTPATIENT
Start: 2019-03-04 | End: 2019-03-05 | Stop reason: HOSPADM

## 2019-03-04 RX ORDER — ENEMA 19; 7 G/133ML; G/133ML
1 ENEMA RECTAL
Status: DISCONTINUED | OUTPATIENT
Start: 2019-03-04 | End: 2019-03-05 | Stop reason: HOSPADM

## 2019-03-04 RX ORDER — SODIUM CHLORIDE, SODIUM LACTATE, POTASSIUM CHLORIDE, CALCIUM CHLORIDE 600; 310; 30; 20 MG/100ML; MG/100ML; MG/100ML; MG/100ML
INJECTION, SOLUTION INTRAVENOUS CONTINUOUS
Status: ACTIVE | OUTPATIENT
Start: 2019-03-04 | End: 2019-03-04

## 2019-03-04 RX ORDER — ROPIVACAINE HYDROCHLORIDE 5 MG/ML
INJECTION, SOLUTION EPIDURAL; INFILTRATION; PERINEURAL
Status: DISCONTINUED | OUTPATIENT
Start: 2019-03-04 | End: 2019-03-04 | Stop reason: HOSPADM

## 2019-03-04 RX ORDER — EPINEPHRINE 1 MG/ML(1)
AMPUL (ML) INJECTION
Status: DISCONTINUED | OUTPATIENT
Start: 2019-03-04 | End: 2019-03-04 | Stop reason: HOSPADM

## 2019-03-04 RX ORDER — ONDANSETRON 2 MG/ML
4 INJECTION INTRAMUSCULAR; INTRAVENOUS EVERY 4 HOURS PRN
Status: DISCONTINUED | OUTPATIENT
Start: 2019-03-04 | End: 2019-03-05 | Stop reason: HOSPADM

## 2019-03-04 RX ORDER — ONDANSETRON 2 MG/ML
4 INJECTION INTRAMUSCULAR; INTRAVENOUS
Status: DISCONTINUED | OUTPATIENT
Start: 2019-03-04 | End: 2019-03-04 | Stop reason: HOSPADM

## 2019-03-04 RX ADMIN — SODIUM CHLORIDE, SODIUM LACTATE, POTASSIUM CHLORIDE, CALCIUM CHLORIDE: 600; 310; 30; 20 INJECTION, SOLUTION INTRAVENOUS at 14:25

## 2019-03-04 RX ADMIN — STANDARDIZED SENNA CONCENTRATE AND DOCUSATE SODIUM 1 TABLET: 8.6; 5 TABLET, FILM COATED ORAL at 21:47

## 2019-03-04 RX ADMIN — SODIUM CHLORIDE, POTASSIUM CHLORIDE, SODIUM LACTATE AND CALCIUM CHLORIDE: 600; 310; 30; 20 INJECTION, SOLUTION INTRAVENOUS at 18:32

## 2019-03-04 RX ADMIN — Medication 0.5 ML: at 14:24

## 2019-03-04 RX ADMIN — OXYCODONE HYDROCHLORIDE 5 MG: 5 TABLET ORAL at 21:47

## 2019-03-04 RX ADMIN — ACETAMINOPHEN 1000 MG: 500 TABLET, FILM COATED ORAL at 18:31

## 2019-03-04 RX ADMIN — DOCUSATE SODIUM 100 MG: 100 CAPSULE, LIQUID FILLED ORAL at 18:31

## 2019-03-04 RX ADMIN — OXYCODONE HYDROCHLORIDE 5 MG: 5 TABLET ORAL at 18:31

## 2019-03-04 ASSESSMENT — COGNITIVE AND FUNCTIONAL STATUS - GENERAL
STANDING UP FROM CHAIR USING ARMS: A LITTLE
WALKING IN HOSPITAL ROOM: A LITTLE
SUGGESTED CMS G CODE MODIFIER MOBILITY: CJ
DRESSING REGULAR UPPER BODY CLOTHING: A LITTLE
DRESSING REGULAR LOWER BODY CLOTHING: A LITTLE
MOBILITY SCORE: 21
CLIMB 3 TO 5 STEPS WITH RAILING: A LITTLE
TOILETING: A LITTLE
HELP NEEDED FOR BATHING: A LITTLE
SUGGESTED CMS G CODE MODIFIER DAILY ACTIVITY: CJ
DAILY ACTIVITIY SCORE: 20

## 2019-03-04 ASSESSMENT — LIFESTYLE VARIABLES
EVER_SMOKED: NEVER
EVER_SMOKED: NEVER
ALCOHOL_USE: NO

## 2019-03-04 ASSESSMENT — PATIENT HEALTH QUESTIONNAIRE - PHQ9
1. LITTLE INTEREST OR PLEASURE IN DOING THINGS: NOT AT ALL
SUM OF ALL RESPONSES TO PHQ9 QUESTIONS 1 AND 2: 0
2. FEELING DOWN, DEPRESSED, IRRITABLE, OR HOPELESS: NOT AT ALL

## 2019-03-05 VITALS
DIASTOLIC BLOOD PRESSURE: 77 MMHG | WEIGHT: 266.76 LBS | OXYGEN SATURATION: 98 % | HEART RATE: 61 BPM | SYSTOLIC BLOOD PRESSURE: 127 MMHG | TEMPERATURE: 98 F | BODY MASS INDEX: 33.17 KG/M2 | HEIGHT: 75 IN | RESPIRATION RATE: 18 BRPM

## 2019-03-05 PROCEDURE — 700112 HCHG RX REV CODE 229: Performed by: ORTHOPAEDIC SURGERY

## 2019-03-05 PROCEDURE — 96365 THER/PROPH/DIAG IV INF INIT: CPT

## 2019-03-05 PROCEDURE — 97162 PT EVAL MOD COMPLEX 30 MIN: CPT

## 2019-03-05 PROCEDURE — 700111 HCHG RX REV CODE 636 W/ 250 OVERRIDE (IP): Performed by: ORTHOPAEDIC SURGERY

## 2019-03-05 PROCEDURE — G0378 HOSPITAL OBSERVATION PER HR: HCPCS

## 2019-03-05 PROCEDURE — A9270 NON-COVERED ITEM OR SERVICE: HCPCS | Performed by: ORTHOPAEDIC SURGERY

## 2019-03-05 PROCEDURE — 700102 HCHG RX REV CODE 250 W/ 637 OVERRIDE(OP): Performed by: ORTHOPAEDIC SURGERY

## 2019-03-05 PROCEDURE — 97165 OT EVAL LOW COMPLEX 30 MIN: CPT

## 2019-03-05 PROCEDURE — 700105 HCHG RX REV CODE 258: Performed by: ORTHOPAEDIC SURGERY

## 2019-03-05 RX ORDER — OXYCODONE HYDROCHLORIDE 5 MG/1
5 TABLET ORAL EVERY 4 HOURS PRN
Qty: 42 TAB | Refills: 0 | Status: SHIPPED | OUTPATIENT
Start: 2019-03-05 | End: 2019-04-16

## 2019-03-05 RX ORDER — ONDANSETRON 4 MG/1
4 TABLET, FILM COATED ORAL EVERY 4 HOURS PRN
Qty: 20 TAB | Refills: 0 | Status: SHIPPED | OUTPATIENT
Start: 2019-03-05 | End: 2019-03-10

## 2019-03-05 RX ADMIN — ACETAMINOPHEN 1000 MG: 500 TABLET, FILM COATED ORAL at 00:34

## 2019-03-05 RX ADMIN — RIVAROXABAN 10 MG: 10 TABLET, FILM COATED ORAL at 12:45

## 2019-03-05 RX ADMIN — ACETAMINOPHEN 1000 MG: 500 TABLET, FILM COATED ORAL at 12:44

## 2019-03-05 RX ADMIN — ACETAMINOPHEN 1000 MG: 500 TABLET, FILM COATED ORAL at 06:00

## 2019-03-05 RX ADMIN — DOCUSATE SODIUM 100 MG: 100 CAPSULE, LIQUID FILLED ORAL at 06:00

## 2019-03-05 RX ADMIN — SODIUM CHLORIDE 2 G: 9 INJECTION, SOLUTION INTRAVENOUS at 00:35

## 2019-03-05 RX ADMIN — SODIUM CHLORIDE 2 G: 9 INJECTION, SOLUTION INTRAVENOUS at 06:02

## 2019-03-05 RX ADMIN — OXYCODONE HYDROCHLORIDE 5 MG: 5 TABLET ORAL at 10:54

## 2019-03-05 ASSESSMENT — GAIT ASSESSMENTS
ASSISTIVE DEVICE: FRONT WHEEL WALKER
DISTANCE (FEET): 150
GAIT LEVEL OF ASSIST: STAND BY ASSIST
DEVIATION: STEP TO

## 2019-03-05 ASSESSMENT — COGNITIVE AND FUNCTIONAL STATUS - GENERAL
HELP NEEDED FOR BATHING: A LITTLE
DAILY ACTIVITIY SCORE: 22
DRESSING REGULAR LOWER BODY CLOTHING: A LITTLE
SUGGESTED CMS G CODE MODIFIER DAILY ACTIVITY: CJ

## 2019-03-05 ASSESSMENT — ACTIVITIES OF DAILY LIVING (ADL): TOILETING: INDEPENDENT

## 2019-03-05 NOTE — DISCHARGE INSTRUCTIONS
As per DC packet instructions.  Patient to continue Xarelto 10 mg daily x 1 month.      Discharge Instructions    Discharged to home by car with relative. Discharged via wheelchair, hospital escort: Yes.  Special equipment needed: Not Applicable    Be sure to schedule a follow-up appointment with your primary care doctor or any specialists as instructed.     Discharge Plan:   Influenza Vaccine Indication: Patient Refuses    I understand that a diet low in cholesterol, fat, and sodium is recommended for good health. Unless I have been given specific instructions below for another diet, I accept this instruction as my diet prescription.   Other diet: Regular    Special Instructions: Discharge instructions for the Orthopedic Patient    Follow up with Primary Care Physician within 2 weeks of discharge to home, regarding:  Review of medications and diagnostic testing.  Surveillance for medical complications.  Workup and treatment of osteoporosis, if appropriate.     -Is this a Joint Replacement patient? Yes Total Joint Knee Replacement Discharge Instructions    Pain  - The goal is to slowly wean off the prescription pain medicine.  - Ice can be used for pain control.  20 minutes at a time is recommended, and never directly against your skin or incision.  - Most patients are off the pain pills by 3 weeks; others may require a low level of pain medications for many months.  If your pain continues to be severe, follow up with your physician.  Infection    Knee joint infections; occur in fewer than 2% of patients. The most common causes of infection following total knee replacement surgery are from bacteria that enter the bloodstream during dental procedures, urinary tract infections, or skin infections. These bacteria can lodge around your knee replacement and cause an infection.  - Keep the incision as clean and dry as possible.  - Always wash your hands before touching your incision.  - Skin infections tend to develop  around 7-10 days after surgery; most can be treated with oral antibiotics.  - Dental Care should be delayed for 3 months after surgery, your surgeon recommends taking a dose of antibiotics 1 hour prior to any dental procedure. After 2 years, most surgeons recommend antibiotics only before an extensive procedure.  Ask your surgeon what he recommends.  - Signs and symptoms of infection can include:  low grade fever, redness, pain, swelling and drainage from your incision.  Notify your surgeon immediately if you develop any of these symptoms.  Other instructions  - Bowel habits - constipation is extremely common and is caused by a combination of anesthesia, lack of mobility and pain medicine.  Use stool softeners or laxatives if necessary. It is important not to ignore this problem, as bowel obstructions can be a serious complication after joint replacement surgery.  - Mood/Energy Level - Many patients experience a lack of energy and endurance for up to 2-3 months after surgery.  Some may also feel down and can even become depressed.  This is likely due to the postoperative anemia, change in activity level, lack of sleep, pain medicine and just the emotional reaction to the surgery itself that is a big disruption in a person’s life.  This usually passes.  If symptoms persist, follow up with your primary physician.  - Returning to work - Your surgeon will give you more specific instructions. Depending on the type of activities you perform, it may be 6 to 8 weeks before you return to work.   Generally, if you work a sedentary job requiring little standing or walking, most patients may return within 2-6 weeks.  Manual labor jobs involving walking, lifting and standing may take longer. Your surgeon’s office can provide a release to part-time or light duty work early on in your recovery and progress you to full duty as able.    - Driving - If your left knee was replaced and you have an automatic transmission, you may be  able to begin driving in a week or so, provided you are no longer taking narcotic pain medication. If your right knee was replaced, avoid driving for 6 to 8 weeks. Remember that your reflexes may not be as sharp as before your surgery. Ask your surgeon for specific instructions.   - Avoiding falls - A fall during the first few weeks after surgery can damage your new knee and may result in a need for further surgery.   throw rugs and tack down loose carpeting.  Be aware of floor hazards such as pets, small objects or uneven surfaces.    - [employer] Metal Detectors - The sensitivity of metal detectors varies and it is likely that your prosthesis will cause an alarm.  Inform the  of your artificial joint.  Diet  - Resume your normal diet as tolerated.  - It is important to achieve a healthy nutritional status by eating a well balanced diet on a regular basis.  - Your physician may recommend that you take iron and vitamin supplements.   - Continue to drink plenty of fluids.  Shower/Bathing  - You may shower as soon as you get home from the hospital unless otherwise instructed.  - Keep your incision out of water.  To keep the incision dry when showering, cover it with a plastic bag or plastic wrap.  - Pat incision dry if it gets wet.  Don’t rub.  - Do not submerge in a bath until staples are out and the incision is completely healed. (Approximately 6-8 weeks)  Dressing Change:  Procedure (if recommended by your physician)  - Wash hands.  - Open all new dressing change materials.  - Remove old dressing and discard.  - Inspect incision for redness, increase in clear drainage, yellow/green drainage, odor and surrounding skin hot to touch.  -  ABD (large gauze) pad or “island dressing” by one corner and lay over the incision.  Be careful not to touch the inside of the dressing that will lay over the incision.  - Secure in place as instructed (Ace wrap or tape).    Swelling/Bruising    - Swelling  can last from 3-6 months.  - Elevate your leg higher than your heart while reclining.   The first week you are home you should elevate your leg an equal amount of time, as you are active.    - Anti-inflammatory pills can be taken once you have stopped the blood thinners.  - The swelling is usually worse after you go home since you are upright for longer periods of time.  - Bruising is common and can involve the entire leg including the thigh, calf and even foot. Bruising often does not appear until after you arrive home and it can be quite dramatic- purple, black, and green.  The bruising you can see is not usually concerning and will subside without any treatment.      Blood Clot Prevention  Blood clots in the legs and the less common, but frightening, clots that travel to the lungs are a real focus of our preventative. Most patients are at standard risk for them, but those patients who are at higher risk include people who have had previous clots, a family history of clotting, smoking, diabetes, obesity, advanced age, use of estrogen and a sedentary lifestyle.    - Signs of blood clots in legs - Swelling in thigh, calf or ankle that does not go down with elevation.  Pain, heat and tenderness in calf, back of calf or groin area.  NOTE: blood clots can occur in either leg.  - You have been receiving anticoagulant therapy (blood thinners) in the hospital and you may be instructed to continue at home depending on your risk factors.  - Your risk for developing a clot continues for up to 2-3 months after surgery.  You should avoid prolonged sitting and dehydration during that time (long air trips and car trips).  If you do take a trip during this time, please get up and move around every 1- 1.5 hours.  - If you are prescribed blood-thinning medication for home, follow instructions as directed. (Handouts provided if applicable).      Activity  Once home, you should continue to stay active. The key is to remember not to  overdo it! While you can expect some good days and some bad days, you should notice a gradual improvement and a gradual increase in your endurance over the next 6 to 12 months. Exercise is a critical component of home care, particularly during the first few weeks after surgery.     - Normal activities of daily living You should be able to resume most within 3 to 6 weeks following surgery. Some pain with activity and at night is common for several weeks after surgery  -  Physical Therapy Exercises - Continue to do the exercises prescribed for at least two months after surgery. Riding a stationary bicycle can help maintain muscle tone and keep your knee flexible. Try to achieve the maximum degree of bending and extension possible. (handout provided by Therapist).  - Sexual Activity -. Your surgeon can tell you when it safe to resume sexual activity.    - Sleeping Positions - You can safely sleep on your back, on either side, or on your stomach.   - Other Activities - Walk as much as you like, but remember that walking is no substitute for the exercises your doctor and physical therapist will prescribe. Lower impact activities are preferred.  If you have specific questions, consult your Surgeon.    When to Call the Doctor   Call the physician if:   - Fever over 100.5? F  - Increased pain, drainage, redness, odor or heat around the incision area  - Shaking chills  - Increased knee pain with activity and rest  - Increased pain in calf, tenderness or redness above or below the knee  - Increased swelling of calf, ankle, foot  - Sudden increased shortness of breath, sudden onset of chest pain, localized chest pain with coughing  - Incision opening  Or, if there are any questions or concerns about medications or care.       -Is this patient being discharged with medication to prevent blood clots?  Yes, Xarelto Rivaroxaban oral tablets  What is this medicine?  RIVAROXABAN (ri va ALICE a ban) is an anticoagulant (blood thinner).  It is used to treat blood clots in the lungs or in the veins. It is also used after knee or hip surgeries to prevent blood clots. It is also used to lower the chance of stroke in people with a medical condition called atrial fibrillation.  This medicine may be used for other purposes; ask your health care provider or pharmacist if you have questions.  COMMON BRAND NAME(S): Xarelto, Xarelto Starter Pack  What should I tell my health care provider before I take this medicine?  They need to know if you have any of these conditions:  -bleeding disorders  -bleeding in the brain  -blood in your stools (black or tarry stools) or if you have blood in your vomit  -history of stomach bleeding  -kidney disease  -liver disease  -low blood counts, like low white cell, platelet, or red cell counts  -recent or planned spinal or epidural procedure  -take medicines that treat or prevent blood clots  -an unusual or allergic reaction to rivaroxaban, other medicines, foods, dyes, or preservatives  -pregnant or trying to get pregnant  -breast-feeding  How should I use this medicine?  Take this medicine by mouth with a glass of water. Follow the directions on the prescription label. Take your medicine at regular intervals. Do not take it more often than directed. Do not stop taking except on your doctor's advice. Stopping this medicine may increase your risk of a blood clot. Be sure to refill your prescription before you run out of medicine.  If you are taking this medicine after hip or knee replacement surgery, take it with or without food. If you are taking this medicine for atrial fibrillation, take it with your evening meal. If you are taking this medicine to treat blood clots, take it with food at the same time each day. If you are unable to swallow your tablet, you may crush the tablet and mix it in applesauce. Then, immediately eat the applesauce. You should eat more food right after you eat the applesauce containing the crushed  tablet.  Talk to your pediatrician regarding the use of this medicine in children. Special care may be needed.  Overdosage: If you think you have taken too much of this medicine contact a poison control center or emergency room at once.  NOTE: This medicine is only for you. Do not share this medicine with others.  What if I miss a dose?  If you take your medicine once a day and miss a dose, take the missed dose as soon as you remember. If you take your medicine twice a day and miss a dose, take the missed dose immediately. In this instance, 2 tablets may be taken at the same time. The next day you should take 1 tablet twice a day as directed.  What may interact with this medicine?  Do not take this medicine with any of the following medications:  -defibrotide  This medicine may also interact with the following medications:  -aspirin and aspirin-like medicines  -certain antibiotics like erythromycin, azithromycin, and clarithromycin  -certain medicines for fungal infections like ketoconazole and itraconazole  -certain medicines for irregular heart beat like amiodarone, quinidine, dronedarone  -certain medicines for seizures like carbamazepine, phenytoin  -certain medicines that treat or prevent blood clots like warfarin, enoxaparin, and dalteparin  -conivaptan  -diltiazem  -felodipine  -indinavir  -lopinavir; ritonavir  -NSAIDS, medicines for pain and inflammation, like ibuprofen or naproxen  -ranolazine  -rifampin  -ritonavir  -SNRIs, medicines for depression, like desvenlafaxine, duloxetine, levomilnacipran, venlafaxine  -SSRIs, medicines for depression, like citalopram, escitalopram, fluoxetine, fluvoxamine, paroxetine, sertraline  -Enrrique's wort  -verapamil  This list may not describe all possible interactions. Give your health care provider a list of all the medicines, herbs, non-prescription drugs, or dietary supplements you use. Also tell them if you smoke, drink alcohol, or use illegal drugs. Some items may  interact with your medicine.  What should I watch for while using this medicine?  Visit your doctor or health care professional for regular checks on your progress.  Notify your doctor or health care professional and seek emergency treatment if you develop breathing problems; changes in vision; chest pain; severe, sudden headache; pain, swelling, warmth in the leg; trouble speaking; sudden numbness or weakness of the face, arm or leg. These can be signs that your condition has gotten worse.  If you are going to have surgery or other procedure, tell your doctor that you are taking this medicine.  What side effects may I notice from receiving this medicine?  Side effects that you should report to your doctor or health care professional as soon as possible:  -allergic reactions like skin rash, itching or hives, swelling of the face, lips, or tongue  -back pain  -redness, blistering, peeling or loosening of the skin, including inside the mouth  -signs and symptoms of bleeding such as bloody or black, tarry stools; red or dark-brown urine; spitting up blood or brown material that looks like coffee grounds; red spots on the skin; unusual bruising or bleeding from the eye, gums, or nose  Side effects that usually do not require medical attention (report to your doctor or health care professional if they continue or are bothersome):  -dizziness  -muscle pain  This list may not describe all possible side effects. Call your doctor for medical advice about side effects. You may report side effects to FDA at 3-146-FDA-7835.  Where should I keep my medicine?  Keep out of the reach of children.  Store at room temperature between 15 and 30 degrees C (59 and 86 degrees F). Throw away any unused medicine after the expiration date.  NOTE: This sheet is a summary. It may not cover all possible information. If you have questions about this medicine, talk to your doctor, pharmacist, or health care provider.  © 2018 [employer] (2017-09-06 16:29:33)      · Is patient discharged on Warfarin / Coumadin?   No     Depression / Suicide Risk    As you are discharged from this [facility] facility, it is important to learn how to keep safe from harming yourself.    Recognize the warning signs:  · Abrupt changes in personality, positive or negative- including increase in energy   · Giving away possessions  · Change in eating patterns- significant weight changes-  positive or negative  · Change in sleeping patterns- unable to sleep or sleeping all the time   · Unwillingness or inability to communicate  · Depression  · Unusual sadness, discouragement and loneliness  · Talk of wanting to die  · Neglect of personal appearance   · Rebelliousness- reckless behavior  · Withdrawal from people/activities they love  · Confusion- inability to concentrate     If you or a loved one observes any of these behaviors or has concerns about self-harm, here's what you can do:  · Talk about it- your feelings and reasons for harming yourself  · Remove any means that you might use to hurt yourself (examples: pills, rope, extension cords, firearm)  · Get professional help from the community (Mental Health, Substance Abuse, psychological counseling)  · Do not be alone:Call your Safe Contact- someone whom you trust who will be there for you.  · Call your local CRISIS HOTLINE 723-9107 or 283-697-7650  · Call your local Children's Mobile Crisis Response Team [place] (153) 098-3996 or www.Yast  · Call the toll free National Suicide Prevention Hotlines   · National Suicide Prevention Lifeline 475-132-IGSE (4611)  · National Hope Line Network 800-SUICIDE (066-1519)

## 2019-03-05 NOTE — THERAPY
"Occupational Therapy Evaluation completed.   Functional Status:  Mod I ADLs/ADL transfers  Plan of Care: Patient with no further skilled OT needs in the acute care setting at this time  Discharge Recommendations:  Equipment: No Equipment Needed. Post-acute therapy Anticipate that the patient will have no further occupational therapy needs after discharge from the hospital.     See \"Rehab Therapy-Acute\" Patient Summary Report for complete documentation.    Pt is 57yo male s/p R knee open lysis of adhesions and synovectomy after previous TKA in October 2018. Reviewed post-op ADL completion strategies, pt demonstrated dressing, functional transfers, toileting, and grooming at mod I level with FWW. Pt has no additional questions or concerns for ADL completion upon d/c home with spouse assist.   "

## 2019-03-05 NOTE — PROGRESS NOTES
Report received.  Assumed Care.  Pt in bed, resting comfortably. AOx4, responds appropriately.  Denies pain, SOB.  Plan of care discussed.  Explained importance of calling before getting OOB and pt verbalizes understanding.  Call light and belongings within reach, treaded slipper socks on, bed alarm in use, bed in lowest position.  Will monitor frequently.

## 2019-03-05 NOTE — OR NURSING
1634 To PACU from OR via bed. Pt sleeping, respirations spontaneous and non-labored via LMA. Clean surgical dressing to right knee. Toes to affected extremity are pink and warm, brisk cap refill observed, pedal pulse palpable.  1636 Pt rouses to verbal stimuli, LMA removed. Pt reports 1/10 tolerable pain to his right knee. Pt denies nausea. Pt able to move toes to affected extremity, reports numbness.  1645 No changes. Pt's wife updated via .  1700 No changes.  1715 No changes.  1730 No changes.   1745 No changes.  1758 Report to JOSE ROBERTO Thomson.

## 2019-03-05 NOTE — THERAPY
"Physical Therapy Evaluation completed.   Bed Mobility:  Supine to Sit: Modified Independent  Transfers: Sit to Stand: Modified Independent  Gait: Level Of Assist: Stand by Assist with Front-Wheel Walker    X 150 feet   Plan of Care: Patient with no further skilled PT needs in the acute care setting at this time  Discharge Recommendations: Equipment: No Equipment Needed. Post-acute therapy Discharge to home with outpatient or home health for additional skilled therapy services.  58 year old male S/P revision of R TKR .Pt lives at home with wife and is active.Pt is safe with bed mob,transfers and ambulation,he understands HEP and has no equipment needs  See \"Rehab Therapy-Acute\" Patient Summary Report for complete documentation.     "

## 2019-03-05 NOTE — RESPIRATORY CARE
COPD EDUCATION by COPD CLINICAL EDUCATOR  3/5/2019 at 7:36 AM by Gali Sherwood     Patient reviewed by COPD education team. Patient does not qualify for COPD program.

## 2019-03-05 NOTE — PROGRESS NOTES
1931 Assessment completed. Pt AAOx4. C/o 2/10 pain in the surgical site (right knee).CMS intact. Denies any nausea, SOB, dizziness. Polar ice and SCd on. Safety and comfort measures in place.  No additional needs at this time.    2150 Pt's surgical site was bleeding upon reassessment. Surgical site dressing reinforced with elastic bandage. CMS intact. Pt asymptomatic and resting in bed. Charge RN aware. Safety precaution in place.

## 2019-03-05 NOTE — OP REPORT
DATE OF SERVICE:  03/04/2019    SURGEON:  Jeffry Christopher MD    ASSISTANT:  Blair Long MD    ANESTHESIOLOGIST:  Lazara Daugherty MD    PREOPERATIVE DIAGNOSIS:  Right knee arthrofibrosis, status post total knee   arthroplasty.    POSTOPERATIVE DIAGNOSIS:  Right knee arthrofibrosis, status post total knee   arthroplasty.    PROCEDURE PERFORMED:  Right knee open lysis of adhesions and synovectomy.    INDICATIONS:  Treat right knee stiffness, improve pain, improve function.    ANESTHESIA:  General anesthesia with single shot adductor canal block.    HISTORY OF PRESENT ILLNESS:  The patient is a very pleasant 58-year-old male   who underwent a right total knee arthroplasty by myself on 10/25/2018.  The   patient did quite well, but then developed a postoperative DVT and pulmonary   embolism.  He was placed on blood thinners and began to experience some   difficulty with range of motion and stiffness.  He underwent a manipulation   under anesthesia, which improved some of his flexion, but he still has some   difficulty with full extension.  The patient had recently ultrasound of his   lower extremity, which demonstrated persistent DVTs plus blood clot.  He was   taken off his blood thinners for short period of time by his primary care   physician and cleared to undergo a lysis of adhesions surgery for treatment of   his stiffness.  He has been n.p.o. since midnight.  He is medically cleared   for surgery by the anesthesia team.    INFORMED CONSENT:  The patient was informed of the risks, benefits, and   alternatives of planned operation.  The risks include but not limited to   bleeding, infection, neurovascular damage, pain, recurrent stiffness, DVT, PE,   MI, stroke, and death.  Advanced directives were reviewed.  After answering   all questions, the patient agreed to proceed with planned operation and   informed consent form was signed.    IMPLANTS:  None.    DESCRIPTION OF PROCEDURE:  The patient was identified  in the preoperative   holding area.  The correct procedural side and site were identified and   marked.  The patient was then brought to the operating room and transferred to   the operating table.  All bony prominences were well padded.  She received a   single shot adductor canal block under ultrasound-guidance by the anesthesia   team followed by general anesthesia.  A tourniquet was applied to the right   upper thigh.    Examination of the right knee demonstrated a well-healed incision over the   front part of the knee joint.  The range of motion was from about 5 degrees   short of full extension to about 105 degrees of flexion.    The right knee was then cleaned with several alcohol-soaked gauzes.  The right   lower extremity was then prepped and draped in a normal standard sterile   fashion.    A procedural pause was performed by the operating team.  The procedure, the   patient's identity, operative side, surgical site, and the procedure were all   verified.  The patient was given IV antibiotics prior to incision.    The right lower extremity was elevated and exsanguinated.  The tourniquet was   inflated to 250 mmHg.  An anterior midline incision and medial parapatellar   arthrotomy was then performed.  The site was then opened.  Large soft tissue   planes were then developed.  We then applied Kocher clamps to the extensor   mechanism.  I performed an extensive synovectomy and lysis of adhesions over   the posterior part of the quadriceps tendon and patellar tendon.  We then   applied a curved clamp to the medial part of the arthrotomy and removed a   large amount of scar tissue in this area as well.  We then performed an   extensive synovectomy of the suprapatellar pouch as well as the infrapatellar   fat pad.  We then also developed planes around the medial and lateral part of   the knee and secured with any sort of adhesed tissue.  We then flexed the knee   joint and also debrided a large amount of scar  tissue from the box.  At this   point in time, his range of motion was around about 0 to about 135 degrees of   flexion.  The knee was stable on exam.  His implant appeared to be well fixed   with no evidence of loss of fixation.  The polyethylene also appeared to be in   excellent condition.  As a result, we elected to hold off any sort of further   dissection and polyethylene exchange.  Once again, at this point in time, the   range of motion was from 0-135.  There was excellent stability of varus and   valgus at 0 and 30.  There is also excellent anterior and posterior stability   at 90 degrees of flexion.    Tourniquet was then released and the wound was copiously irrigated.    Meticulous hemostasis was obtained.  The wound was then closed with a Quill   suture.  The skin was then closed in a layered fashion with interrupted 2-0   Vicryl followed by staples and a sterile silver dressing.  A Alfonso hose was   applied.    Needle and sponge counts were correct at the end of the procedure, as reported   by the circulating nurse.  The patient tolerated the procedure without any   complications.  The patient was transferred off the operative room table onto   a regular bed.  He was extubated by the anesthesia team.    ESTIMATED BLOOD LOSS:  100 mL.    COMPLICATIONS:  None.    TOURNIQUET TIME:  13 minutes.    SPECIMENS:  None.    WOUND TYPE:  Type 1 clean.    POSTOPERATIVE PLAN:  He will be admitted to the hospital for overnight   observation and will continue with physical therapy and aggressive range of   motion.  The patient will continue to receive perioperative antibiotics.  As   per the patient's primary care physician instructions, the patient will be   restarted on Xarelto 10 mg daily starting tomorrow for a period of 4 weeks.    We then transition to a daily aspirin.  I expect the patient will be   discharged from the hospital later this week once mobilizing safely and   tolerating oral medications.        ____________________________________     MD HARISH Rust / RUY    DD:  03/04/2019 16:44:02  DT:  03/04/2019 19:05:47    D#:  [id number]  Job#:  [id number]    cc: Lazara Daugherty M.D.

## 2019-03-05 NOTE — PROGRESS NOTES
2 RN skin assessment complete. Skin not WDL. Pt has surgical incision on right knee (see flowsheet).

## 2019-03-05 NOTE — PROGRESS NOTES
Report given to Day RN. POC discussed. Pt resting comfortably in bed. Safety precautions in place.

## 2019-03-05 NOTE — CARE PLAN
Problem: Communication  Goal: The ability to communicate needs accurately and effectively will improve  Outcome: PROGRESSING AS EXPECTED    Intervention: [place] patient and significant other/support system to call light to alert staff of needs  Patient oriented to surroundings and unit policies/routines.  Educated and understands the use of the call button.  Patient calls appropriately.      Problem: Safety  Goal: Will remain free from falls  Outcome: PROGRESSING AS EXPECTED    Intervention: Implement fall precautions  Patient educated and understands the fall precautions in place to prevent falls.  Bed alarm is on, IV pole closest to bathroom, treaded slipper socks on, and bedrails closest to bathroom down.  Patient also educated and understands the use of the call button for any assistance with mobility.

## 2019-03-05 NOTE — CARE PLAN
Problem: Pain Management  Goal: Pain level will decrease to patient's comfort goal  Outcome: PROGRESSING AS EXPECTED  Pt's pain well controlled. Medicated per MD order. Taught to inform RN if pain is greater than comfort level.    Problem: Safety  Goal: Will remain free from injury  Outcome: PROGRESSING AS EXPECTED  Pt educated to call when in need. Pt uses FWW during ambulation. Call light and personal belongings w/n reach. Room free of clutters. Bed locked and in lowest position. Answer call light immediately.

## 2019-03-06 NOTE — PROGRESS NOTES
Discharging pt home per MD order. Discussed discharge instructions, follow up appointments, prescriptions, and home care for Total Knee. Pt voiding and ambulating without difficulty, pain controlled, tolerating diet. Family at bedside, all questions answered. Pt discharged off unit with hospital escort at 1800.

## 2019-04-10 ENCOUNTER — HOSPITAL ENCOUNTER (OUTPATIENT)
Dept: RADIOLOGY | Facility: MEDICAL CENTER | Age: 59
End: 2019-04-10
Attending: ORTHOPAEDIC SURGERY
Payer: COMMERCIAL

## 2019-04-10 DIAGNOSIS — R22.41 LOWER LEG MASS, RIGHT: ICD-10-CM

## 2019-04-10 DIAGNOSIS — M79.661 RIGHT CALF PAIN: ICD-10-CM

## 2019-04-10 PROCEDURE — 93971 EXTREMITY STUDY: CPT | Mod: RT

## 2019-04-12 NOTE — DISCHARGE SUMMARY
DATE OF ADMISSION:  03/04/2019.    DATE OF DISCHARGE:  03/05/2019.    HOSPITAL COURSE:  This is a very pleasant 59-year-old male who underwent a   right knee open lysis of adhesions and synovectomy for treatment of   arthrofibrosis following a right total knee arthroplasty on the date of   admission.  Following surgery, he was admitted to the hospital where he was   continued to receive perioperative IV antibiotics.  He was started with   physical therapy to work on aggressive range of motion.  On postoperative day   #1, he was mobilizing safely with the use of a walker and his pain was   controlled with oral medications.    PHYSICAL EXAMINATION:  The knee at the time of discharge demonstrated clean,   dry, and intact silver sterile dressing.  There is no swelling or scarring in   the lower leg and calf.  The patient was neurovascularly intact.    POSTOPERATIVE MEDICATIONS:  1.  Oxycodone 5 mg 1 tablet p.o. every 4-6 hours as needed for pain.  2.  Zofran 4 mg 1 tablet p.o. every 6-8 hours as needed for nausea.  3.  Xarelto 10 mg once daily x4 weeks under the direction of the patient's   primary care physician.    POST-DISCHARGE PLAN:  Patient will be discharged from the hospital on   postoperative day #1.  I would continue aggressive outpatient physical therapy   with a strong focus on range of motion, starting later this week.  We will   plan to see him in clinic about 10-14 days for wound check, x-rays and likely   staple removal.       ____________________________________     MD HARISH Rust / RUY    DD:  04/11/2019 16:38:55  DT:  04/12/2019 02:48:29    D#:  [id number]  Job#:  [id number]

## 2019-05-01 ENCOUNTER — OFFICE VISIT (OUTPATIENT)
Dept: NEUROLOGY | Facility: MEDICAL CENTER | Age: 59
End: 2019-05-01
Payer: COMMERCIAL

## 2019-05-01 VITALS
DIASTOLIC BLOOD PRESSURE: 72 MMHG | TEMPERATURE: 98.2 F | HEIGHT: 76 IN | HEART RATE: 62 BPM | SYSTOLIC BLOOD PRESSURE: 134 MMHG | BODY MASS INDEX: 33.83 KG/M2 | OXYGEN SATURATION: 94 % | WEIGHT: 277.8 LBS

## 2019-05-01 DIAGNOSIS — G40.909 SEIZURE CEREBRAL (HCC): ICD-10-CM

## 2019-05-01 DIAGNOSIS — R55 SYNCOPE, UNSPECIFIED SYNCOPE TYPE: ICD-10-CM

## 2019-05-01 PROCEDURE — 99204 OFFICE O/P NEW MOD 45 MIN: CPT | Performed by: PSYCHIATRY & NEUROLOGY

## 2019-05-01 RX ORDER — NAPROXEN 500 MG/1
500 TABLET ORAL 2 TIMES DAILY WITH MEALS
COMMUNITY
End: 2023-04-10

## 2019-05-01 NOTE — PROGRESS NOTES
NEUROLOGY NOTE    Referring Physician  Candie Cano M.D.      CHIEF COMPLAINT:    2 weeks after right knee replacement for one day  6 times of passing out after right knee replacement   Found to have PE-- during 2 hospital admission  Blood pressure medication was reduced    Chief Complaint   Patient presents with   • Establish Care     Convulsive syncope       PRESENT ILLNESS:       2 weeks after right knee replacement for one day  6 times of passing out after right knee replacement   Found to have PE-- during 2 hospital admission  Blood pressure medication was reduced    In hindsight, the patient's passing out were secondary to PE and low blood pressure    The patient received MRI of brain and routine EEG nov 2018-- which were not remarkable  The patient denied any passing out spells since Nov 2018  The last spells was close to minutes long-- the patient did not wake up till the ambulance came- the blood pressure was fine then      PAST MEDICAL HISTORY:  Past Medical History:   Diagnosis Date   • Arthritis     knees   • Blood clotting disorder (HCC)     small blood clots in each lung post knee surgery 10/2018   • Hypertension    • Pulmonary emboli (HCC) 10/2018       PAST SURGICAL HISTORY:  Past Surgical History:   Procedure Laterality Date   • KNEE REVISION TOTAL Right 3/4/2019    Procedure: OPEN LYSIS OF ADHESIONS,;  Surgeon: Jeffry Christopher M.D.;  Location: [facility];  Service: Orthopedics   • SYNOVECTOMY Right 3/4/2019    Procedure: SYNOVECTOMY;  Surgeon: Jeffry Christopher M.D.;  Location: [facility];  Service: Orthopedics   • KNEE MANIPULATION Right 12/28/2018    Procedure: KNEE MANIPULATION;  Surgeon: Jeffry Christopher M.D.;  Location: [facility];  Service: Orthopedics   • JOINT INJECTION DIAGNOSTIC Left 12/28/2018    Procedure: JOINT INJECTION DIAGNOSTIC-   CORTISONE;  Surgeon: Jeffry Christopher M.D.;  Location: [facility];   Service: Orthopedics   • KNEE ARTHROPLASTY TOTAL Right 10/25/2018    Procedure: KNEE ARTHROPLASTY TOTAL-  PROCEED AS INDICATED;  Surgeon: Jeffry Christopher M.D.;  Location: [facility];  Service: Orthopedics   • JOINT INJECTION DIAGNOSTIC Left 10/25/2018    Procedure: JOINT INJECTION DIAGNOSTIC- KNEE CORTISONE INJECTION;  Surgeon: Jeffry Christopher M.D.;  Location: [facility];  Service: Orthopedics   • OTHER ORTHOPEDIC SURGERY  '75    norebrt knee       FAMILY HISTORY:  History reviewed. No pertinent family history.    SOCIAL HISTORY:  Social History     Social History   • Marital status:      Spouse name: N/A   • Number of children: N/A   • Years of education: N/A     Occupational History   • Not on file.     Social History Main Topics   • Smoking status: Never Smoker   • Smokeless tobacco: Current User     Types: Snuff, Chew   • Alcohol use No   • Drug use: No   • Sexual activity: Not on file     Other Topics Concern   • Not on file     Social History Narrative   • No narrative on file     ALLERGIES:  No Known Allergies  TOBHX  History   Smoking Status   • Never Smoker   Smokeless Tobacco   • Current User   • Types: Snuff, Chew     ALCHX  History   Alcohol Use No     DRUGHX  History   Drug Use No           MEDICATIONS:  Current Outpatient Prescriptions   Medication   • naproxen (NAPROSYN) 500 MG Tab   • lisinopril (PRINIVIL) 2.5 MG Tab   • docusate sodium (COLACE) 100 MG Cap   • Cholecalciferol (VITAMIN D) 2000 units Cap     No current facility-administered medications for this visit.        REVIEW OF SYSTEM:    Constitutional: Denies fevers, Denies weight changes   Eyes: Denies changes in vision, no eye pain   Ears/Nose/Throat/Mouth: Denies nasal congestion or sore throat   Cardiovascular: Denies chest pain or palpitations   Respiratory: PE  Gastrointestinal/Hepatic: Denies abdominal pain, nausea, vomiting, diarrhea, constipation or GI bleeding   Genitourinary: Denies  "bladder dysfunction, dysuria or frequency   Musculoskeletal/Rheum: Denies joint pain and swelling   Skin/Breast: Denies rash, denies breast lumps or discharge   Neurological: spells of passing out 6 times after right knee surgery  Psychiatric: denies mood disorder   Endocrine: denies hx of diabetes or thyroid dysfunction   Heme/Oncology/Lymph Nodes: Denies enlarged lymph nodes, denies brusing or known bleeding disorder   Allergic/Immunologic: Denies hx of allergies         PHYSICAL AND NEUROLOGICAL EXMAINATIONS:  VITAL SIGNS: /72   Pulse 62   Temp 36.8 °C (98.2 °F) (Temporal)   Ht 1.93 m (6' 4\")   Wt (!) 126 kg (277 lb 12.8 oz)   SpO2 94%   BMI 33.81 kg/m²   CURRENT WEIGHT:   BMI: Body mass index is 33.81 kg/m².  PREVIOUS WEIGHTS:  Wt Readings from Last 25 Encounters:   05/01/19 (!) 126 kg (277 lb 12.8 oz)   03/05/19 121 kg (266 lb 12.1 oz)   12/28/18 116.4 kg (256 lb 9.9 oz)   11/10/18 124.5 kg (274 lb 8 oz)   11/02/18 120 kg (264 lb 8.8 oz)   10/25/18 123.8 kg (273 lb)   10/04/17 (!) 128.4 kg (283 lb)       General appearance of patient: WDWN(+) NAD(+)    EYES  o Fundus : Papilledem(-) Exudates(-) Hemorrhage(-)  Nervous System  Orientation to time, place and person(+)  Memory normal(-)  Language: aphasia(-)  Knowledge: past(+) Current(+)  Attention(+)  Cranial Nerves  • Nerve 2: intact  • Nerve 3,4,6: intact  • Nerve 5 : intact  • Nerve 7: intact  • Nerve 8: intact  • Nerve 9 & 10: intact  • Nerve 11: intact  • Nerve 12: intact  Muscle Power and muscle tone: symmetric, normal in upper and lower  Sensory System: Pin sensation intact(+)  Reflexes: symmetric throughout  Cerebellar Function FNP normal   Gait : Steady(+) TandemGait steady(+)  Heart and Vascular  Peripheral Vasucular system : Edema (-) Swelling(-)  RHB, Breathing sound clear  abdomen bowel sound normoactive  Extremities freely moveable  Joints no contracture       NEUROIMAGING: I reviewed the MRI/CT of brain       LAB:    "         IMPRESSION:    1. Recurrent spells of passing out after right knee surgery-- no more spells after Nov 2018  2. Pulmonary embolism after 1  3. Weight loss 40 lb 3 months after 1 but gained weight recently ( cause unknown)    PLAN/RECOMMENDATIONS:    ________________________________________________________________________      Explain to the patient the patient's spells of passing out might be either   Cardiovascular ( syncope) vs neurogenic ( seizure)--- the first one sounds more likely but the last spell lasted for minutes-- which was kind of unusual  Furthermore, syncope and seizure could happen in the same person sometimes-- like ictal syncope, like two diseases together    ________________________________________________________________________      The patient promises to contact us if any spells of passing out  We will repeat EEG of brain   Observation for now is fine    We also advise the patient not to drive 3 months after any spells of passing out      SIGNATURE:  Kalani Yung      CC:  Candie Cano M.D.      11/2018 MRI of brain --

## 2019-05-08 ENCOUNTER — HOSPITAL ENCOUNTER (OUTPATIENT)
Dept: LAB | Facility: MEDICAL CENTER | Age: 59
End: 2019-05-08
Attending: FAMILY MEDICINE
Payer: COMMERCIAL

## 2019-05-08 LAB
ALBUMIN SERPL BCP-MCNC: 4 G/DL (ref 3.2–4.9)
ALBUMIN/GLOB SERPL: 1.3 G/DL
ALP SERPL-CCNC: 74 U/L (ref 30–99)
ALT SERPL-CCNC: 38 U/L (ref 2–50)
ANION GAP SERPL CALC-SCNC: 7 MMOL/L (ref 0–11.9)
AST SERPL-CCNC: 18 U/L (ref 12–45)
BASOPHILS # BLD AUTO: 0.7 % (ref 0–1.8)
BASOPHILS # BLD: 0.04 K/UL (ref 0–0.12)
BILIRUB SERPL-MCNC: 0.4 MG/DL (ref 0.1–1.5)
BUN SERPL-MCNC: 17 MG/DL (ref 8–22)
CALCIUM SERPL-MCNC: 9.9 MG/DL (ref 8.5–10.5)
CHLORIDE SERPL-SCNC: 106 MMOL/L (ref 96–112)
CHOLEST SERPL-MCNC: 200 MG/DL (ref 100–199)
CO2 SERPL-SCNC: 26 MMOL/L (ref 20–33)
CREAT SERPL-MCNC: 0.98 MG/DL (ref 0.5–1.4)
EOSINOPHIL # BLD AUTO: 0.15 K/UL (ref 0–0.51)
EOSINOPHIL NFR BLD: 2.8 % (ref 0–6.9)
ERYTHROCYTE [DISTWIDTH] IN BLOOD BY AUTOMATED COUNT: 47 FL (ref 35.9–50)
FASTING STATUS PATIENT QL REPORTED: NORMAL
GLOBULIN SER CALC-MCNC: 3.2 G/DL (ref 1.9–3.5)
GLUCOSE SERPL-MCNC: 103 MG/DL (ref 65–99)
HCT VFR BLD AUTO: 45.3 % (ref 42–52)
HDLC SERPL-MCNC: 57 MG/DL
HGB BLD-MCNC: 14.2 G/DL (ref 14–18)
IMM GRANULOCYTES # BLD AUTO: 0.01 K/UL (ref 0–0.11)
IMM GRANULOCYTES NFR BLD AUTO: 0.2 % (ref 0–0.9)
LDLC SERPL CALC-MCNC: 126 MG/DL
LYMPHOCYTES # BLD AUTO: 1.76 K/UL (ref 1–4.8)
LYMPHOCYTES NFR BLD: 32.7 % (ref 22–41)
MCH RBC QN AUTO: 27.2 PG (ref 27–33)
MCHC RBC AUTO-ENTMCNC: 31.3 G/DL (ref 33.7–35.3)
MCV RBC AUTO: 86.8 FL (ref 81.4–97.8)
MONOCYTES # BLD AUTO: 0.48 K/UL (ref 0–0.85)
MONOCYTES NFR BLD AUTO: 8.9 % (ref 0–13.4)
NEUTROPHILS # BLD AUTO: 2.94 K/UL (ref 1.82–7.42)
NEUTROPHILS NFR BLD: 54.7 % (ref 44–72)
NRBC # BLD AUTO: 0 K/UL
NRBC BLD-RTO: 0 /100 WBC
PLATELET # BLD AUTO: 242 K/UL (ref 164–446)
PMV BLD AUTO: 12.8 FL (ref 9–12.9)
POTASSIUM SERPL-SCNC: 3.9 MMOL/L (ref 3.6–5.5)
PROT SERPL-MCNC: 7.2 G/DL (ref 6–8.2)
PSA SERPL-MCNC: 1.09 NG/ML (ref 0–4)
RBC # BLD AUTO: 5.22 M/UL (ref 4.7–6.1)
SODIUM SERPL-SCNC: 139 MMOL/L (ref 135–145)
TESTOST SERPL-MCNC: 240 NG/DL (ref 175–781)
TRIGL SERPL-MCNC: 84 MG/DL (ref 0–149)
WBC # BLD AUTO: 5.4 K/UL (ref 4.8–10.8)

## 2019-05-08 PROCEDURE — 85025 COMPLETE CBC W/AUTO DIFF WBC: CPT

## 2019-05-08 PROCEDURE — 80061 LIPID PANEL: CPT

## 2019-05-08 PROCEDURE — 84403 ASSAY OF TOTAL TESTOSTERONE: CPT

## 2019-05-08 PROCEDURE — 84153 ASSAY OF PSA TOTAL: CPT

## 2019-05-08 PROCEDURE — 80053 COMPREHEN METABOLIC PANEL: CPT

## 2019-05-08 PROCEDURE — 36415 COLL VENOUS BLD VENIPUNCTURE: CPT

## 2019-07-22 ENCOUNTER — APPOINTMENT (OUTPATIENT)
Dept: RADIOLOGY | Facility: MEDICAL CENTER | Age: 59
End: 2019-07-22
Attending: EMERGENCY MEDICINE
Payer: COMMERCIAL

## 2019-07-22 ENCOUNTER — HOSPITAL ENCOUNTER (EMERGENCY)
Facility: MEDICAL CENTER | Age: 59
End: 2019-07-23
Attending: EMERGENCY MEDICINE
Payer: COMMERCIAL

## 2019-07-22 DIAGNOSIS — S31.25XA: ICD-10-CM

## 2019-07-22 PROCEDURE — 700102 HCHG RX REV CODE 250 W/ 637 OVERRIDE(OP): Performed by: EMERGENCY MEDICINE

## 2019-07-22 PROCEDURE — 73120 X-RAY EXAM OF HAND: CPT | Mod: LT

## 2019-07-22 PROCEDURE — A9270 NON-COVERED ITEM OR SERVICE: HCPCS | Performed by: EMERGENCY MEDICINE

## 2019-07-22 PROCEDURE — 99284 EMERGENCY DEPT VISIT MOD MDM: CPT

## 2019-07-22 RX ORDER — AMOXICILLIN AND CLAVULANATE POTASSIUM 875; 125 MG/1; MG/1
1 TABLET, FILM COATED ORAL ONCE
Status: COMPLETED | OUTPATIENT
Start: 2019-07-23 | End: 2019-07-22

## 2019-07-22 RX ADMIN — AMOXICILLIN AND CLAVULANATE POTASSIUM 1 TABLET: 875; 125 TABLET, FILM COATED ORAL at 23:57

## 2019-07-23 ENCOUNTER — APPOINTMENT (OUTPATIENT)
Dept: RADIOLOGY | Facility: MEDICAL CENTER | Age: 59
End: 2019-07-23
Attending: EMERGENCY MEDICINE
Payer: COMMERCIAL

## 2019-07-23 VITALS
RESPIRATION RATE: 18 BRPM | SYSTOLIC BLOOD PRESSURE: 114 MMHG | BODY MASS INDEX: 32.62 KG/M2 | HEIGHT: 76 IN | HEART RATE: 76 BPM | OXYGEN SATURATION: 95 % | DIASTOLIC BLOOD PRESSURE: 79 MMHG | WEIGHT: 267.86 LBS | TEMPERATURE: 97.7 F

## 2019-07-23 PROCEDURE — 51600 INJECTION FOR BLADDER X-RAY: CPT

## 2019-07-23 PROCEDURE — 700117 HCHG RX CONTRAST REV CODE 255: Performed by: EMERGENCY MEDICINE

## 2019-07-23 RX ORDER — AMOXICILLIN AND CLAVULANATE POTASSIUM 875; 125 MG/1; MG/1
1 TABLET, FILM COATED ORAL 2 TIMES DAILY
Qty: 20 TAB | Refills: 0 | Status: SHIPPED | OUTPATIENT
Start: 2019-07-23 | End: 2019-08-02

## 2019-07-23 RX ADMIN — IOHEXOL 300 ML: 240 INJECTION, SOLUTION INTRATHECAL; INTRAVASCULAR; INTRAVENOUS; ORAL at 02:30

## 2019-07-23 NOTE — ED NOTES
Patient awake alert and oriented x 4, [address] 15, bed in low position, call light within reach, on room air, attached to cardiac monitor, unlabored breathing noted, no cough noted, interacts with staff, interactions noted as appropriate, awaiting imaging results, using cell phone, denies pain.

## 2019-07-23 NOTE — ED NOTES
Patient awake alert and oriented x 4, [address] 15, bed in low position, call light within reach, on room air, attached to cardiac monitor, unlabored breathing noted, no cough noted, interacts with staff, interactions noted as appropriate, wife with patient.

## 2019-07-23 NOTE — ED NOTES
Patient awake alert and oriented x 4, [address] 15, bed in low position, call light within reach, on room air, attached to cardiac monitor, unlabored breathing noted, no cough noted, interacts with staff, interactions noted as appropriate, awaiting disposition at this time.

## 2019-07-23 NOTE — ED NOTES
Patient awake alert and oriented x 4, [address] 15, bed in low position, call light within reach, on room air, attached to cardiac monitor, unlabored breathing noted, no cough noted, interacts with staff, interactions noted as appropriate, wife at bedside, awaiting imaging exams.

## 2019-07-23 NOTE — ED PROVIDER NOTES
ED Provider Note    Scribed for Dex Massey M.D. by Dionne Florez. 7/22/2019, 9:56 PM.    Primary care provider: Candie Cano M.D.  Means of arrival: Walk-in  History obtained from: Patient  History limited by: None    CHIEF COMPLAINT  Chief Complaint   Patient presents with   • Dog Bite     on penis by own dogs       HPI  Sumeet Buenrostro is a 59 y.o. male who presents to the Emergency Department for a chief complaint of a puncture wound to the penis secondary to a dog bite onset eight hours ago. The patient notes that he broke up a dog fight between two dogs and was bit in his left hand and penis. He states that he is unsure of which dog bit him, but notes that both dogs belong to him and their vaccinations are up to date. Associated trauma to the left hand, frequent urination, bloody urine, red bumps, drainage, swelling, redness, pain and ecchymosis to the penis. No exacerbating or alleviating factors were reported. Denies trauma to the testicles or loss of consciousness. The patient's last tetanus shot was six years ago. No past medical history reported.No allergies reported.    REVIEW OF SYSTEMS  See HPI for further details.     PAST MEDICAL HISTORY  The patient has a past medical history of Arthritis; Blood clotting disorder (HCC); Hypertension; and Pulmonary emboli (HCC) (10/2018).    SURGICAL HISTORY  The patient has a past surgical history that includes other orthopedic surgery ('75); knee arthroplasty total (Right, 10/25/2018); joint injection diagnostic (Left, 10/25/2018); knee manipulation (Right, 12/28/2018); joint injection diagnostic (Left, 12/28/2018); knee revision total (Right, 3/4/2019); and synovectomy (Right, 3/4/2019).    SOCIAL HISTORY  Social History   Substance Use Topics   • Smoking status: Never Smoker   • Smokeless tobacco: Current User     Types: Snuff, Chew   • Alcohol use No      History   Drug Use No       FAMILY HISTORY  No family history was reported.     CURRENT  "MEDICATIONS  Current Outpatient Prescriptions:   •  naproxen (NAPROSYN) 500 MG Tab, Take 500 mg by mouth 2 times a day, with meals., Disp: , Rfl:   •  lisinopril (PRINIVIL) 2.5 MG Tab, Take 2.5 mg by mouth every day., Disp: , Rfl:   •  docusate sodium (COLACE) 100 MG Cap, Take 100 mg by mouth 1 time daily as needed for Constipation., Disp: , Rfl:   •  Cholecalciferol (VITAMIN D) 2000 units Cap, Take 1 Cap by mouth every day., Disp: , Rfl:     ALLERGIES  None reported    PHYSICAL EXAM  VITAL SIGNS: /81   Pulse 67   Temp 36.5 °C (97.7 °F) (Temporal)   Resp 18   Ht 1.93 m (6' 4\")   Wt 121.5 kg (267 lb 13.7 oz)   SpO2 95%   BMI 32.60 kg/m²   Constitutional: Alert in no apparent distress.  HENT: No signs of trauma, Bilateral external ears normal, Nose normal.   Eyes: Pupils are equal and reactive, Conjunctiva normal, Non-icteric.   Neck: Normal range of motion, No tenderness, Supple, No stridor.   Cardiovascular: Regular rate and rhythm, no murmurs.   Thorax & Lungs: Normal breath sounds, No respiratory distress, No wheezing, No chest tenderness.   Abdomen: Bowel sounds normal, Soft, No tenderness, No masses, No pulsatile masses. No peritoneal signs.  Skin: Warm, Dry, No rash.   Back: No bony tenderness, No CVA tenderness.   Extremities: Left hand: multiple abrasions and puncture wounds to dorsum of hand. Ecchymosis to the medial/ulnar dorsum of hand and over distal third metacarpal. No gross tenderness, step-offs or deformities. Intact distal pulses.  : Puncture wound to the mid-dorsal base of the penis. Puncture wound to the mid dorsal/proximal glans. Blood at the meatus. Large area of ecchymosis and soft tissue swelling to the ventral distal penis not to include glans.   Musculoskeletal: Good range of motion in all major joints. No deformities noted.   Neurologic: Alert , Normal motor function, Normal sensory function, No focal deficits noted.   Psychiatric: Affect normal, Judgment normal, Mood normal. "     DIAGNOSTIC STUDIES / PROCEDURES       RADIOLOGY  DX-CYSTOURETHROGRAM, VOIDING   Final Result         1.  Contour irregularity of the distal urethra on the left at the level of the glans, could represent extrinsic compression from hematoma or distal urethral injury. No extraluminal contrast is appreciated.      These findings were discussed with the patient's clinician, KIERAN WRAY, on 7/23/2019 2:55 AM.      DX-HAND 2- LEFT   Final Result         1.  No radiographic evidence of acute traumatic injury.        The radiologist's interpretation of all radiological studies and images have been reviewed by me.    COURSE & MEDICAL DECISION MAKING  Pertinent Labs & Imaging studies reviewed. (See chart for details)    9:56 PM - Patient seen and examined at bedside. Plan of care was discussed with patient who verbalizes his understanding and agreement. The patient will be administered antibiotics in the ED and his condition will further be discussed with a Urologist. Ordered Dx-urethrogram retrograde and Dx-hand left to evaluate his symptoms.     10:19 PM- Urology was paged at this time.    10:24 PM- I discussed the patient's case and the above findings with Urology who advises to perform a retrograde urethrogram on the patient to further evaluate his condition.     11:28 PM- Ordered Dx-cystourethrogram    11:51 PM- Patient will be treated with Augmentin 875-125 mg.     3:48 AM- Recheck. Patient re-evaluated at beside. Discussed patient's condition and treatment plan. Patient's radiology results discussed, as shown above. The patient was informed that due to his condition, a garnett cathter must be inserted into his bladder to drain his urine. The patient refused the garnett cathter. He will be referred to Dr. Medellin and was instructed to schedule an appointment within one weeks. He will be discharged home with a prescription of Augmentin 875-125 mg which is to be taken twice daily for ten days. He is stable for discharge  at this time. The patient was instructed to return to the ED if his symptoms worsen. Patient understands and is in agreement.       Decision Making:  This is a 59 y.o. year old male who presents with above incident with dog bite to penis.  Urethrogram does show likely distal urethral injury.  Patient has been started on antibiotics empirically for dog bite.  Initial dose given here.  Patient unfortunately refusing Garnett catheter as was recommended by Dr. Medellin with urology.  He will however follow-up with urology as discussed.  Long bedside conversation about possible complications with foregoing Garnett catheter placement.  He is understanding of these risks and benefits.  He is understanding return precautions he will complete antibiotics.  He is understanding that he should refrain from intercourse until wounds have healed.  He is understanding return precautions with any changes or worsening of his current presentation.     The patient will return for new or worsening symptoms and is stable at the time of discharge.    DISPOSITION:  Patient will be discharged home in stable condition.    FOLLOW UP:  Barron Medellin M.D.  [address]  [facility] [address]  [phone]    Schedule an appointment as soon as possible for a visit in 1 week  f/u with Dr. Medellin in 5 days, keep garnett in place      OUTPATIENT MEDICATIONS:  Discharge Medication List as of 7/23/2019  3:31 AM      START taking these medications    Details   amoxicillin-clavulanate (AUGMENTIN) 875-125 MG Tab Take 1 Tab by mouth 2 times a day for 10 days., Disp-20 Tab, R-0, Print Rx Paper             FINAL IMPRESSION  1. Open wound of penis due to bite          Dionne LAFLEURScrluis), am scribing for, and in the presence of, Dex Massey M.D..    Electronically signed by: Dionne Florez (Say), 7/22/2019    Dex LAFLEUR M.D. personally performed the services described in this documentation, as scribed by Dionne Florez in my presence, and it is both  accurate and complete.    E    The note accurately reflects work and decisions made by me.  Dex Massey  7/23/2019  11:09 PM

## 2019-07-23 NOTE — ED NOTES
Patient awake alert and oriented x 4, Glasc[address] 15, bed in low position, call light within reach, on room air, attached to cardiac monitor, unlabored breathing noted, no cough noted, interacts with staff, interactions noted as appropriate, watching television, frequent rounding performed, will continue to assess patient.

## 2019-07-23 NOTE — ED NOTES
Patient awake alert and oriented x 4, Glascow 15, bed in low position, call light within reach, on room air, attached to cardiac monitor, unlabored breathing noted, no cough noted, interacts with staff, interactions noted as appropriate, awaiting imaging, sleeping at times.

## 2019-07-23 NOTE — ED NOTES
Patient awake alert and oriented x 4, Glascow 15, bed in low position, call light within reach, on room air, attached to cardiac monitor, unlabored breathing noted, no cough noted, interacts with staff, interactions noted as appropriate, garnett draining clear yellow urine.

## 2019-07-23 NOTE — ED NOTES
Patient refused Dr. Bryson garnett aware, ok for discharge.    Patient verbalized understanding of discharge instructions, provided with discharge paperwork, gait steady, ambulated independently to MILY bonilla.

## 2019-07-23 NOTE — ED TRIAGE NOTES
"Chief Complaint   Patient presents with   • Dog Bite     on penis by own dogs     /87   Pulse 66   Temp 36.5 °C (97.7 °F) (Temporal)   Resp 18   Ht 1.93 m (6' 4\")   Wt 121.5 kg (267 lb 13.7 oz)   SpO2 94%     Pt ambulates with a steady gait to triage c/o laceration, swelling to head of penis after he was bit by his own dog breaking up a dog fight. Pt reports the dog is up to date on his shots. Pt reports blood in his urine, reports that he is still able to urinate.   "

## 2019-08-09 ENCOUNTER — HOSPITAL ENCOUNTER (OUTPATIENT)
Dept: LAB | Facility: MEDICAL CENTER | Age: 59
End: 2019-08-09
Attending: FAMILY MEDICINE
Payer: COMMERCIAL

## 2019-08-09 LAB
CHOLEST SERPL-MCNC: 159 MG/DL (ref 100–199)
CREAT UR-MCNC: 319.1 MG/DL
FASTING STATUS PATIENT QL REPORTED: NORMAL
HDLC SERPL-MCNC: 44 MG/DL
LDLC SERPL CALC-MCNC: 105 MG/DL
MICROALBUMIN UR-MCNC: 0.8 MG/DL
MICROALBUMIN/CREAT UR: 3 MG/G (ref 0–30)
TESTOST SERPL-MCNC: 223 NG/DL (ref 175–781)
TRIGL SERPL-MCNC: 52 MG/DL (ref 0–149)

## 2019-08-09 PROCEDURE — 83036 HEMOGLOBIN GLYCOSYLATED A1C: CPT

## 2019-08-09 PROCEDURE — 82043 UR ALBUMIN QUANTITATIVE: CPT

## 2019-08-09 PROCEDURE — 36415 COLL VENOUS BLD VENIPUNCTURE: CPT

## 2019-08-09 PROCEDURE — 84403 ASSAY OF TOTAL TESTOSTERONE: CPT

## 2019-08-09 PROCEDURE — 82570 ASSAY OF URINE CREATININE: CPT

## 2019-08-09 PROCEDURE — 80061 LIPID PANEL: CPT

## 2019-08-10 LAB
EST. AVERAGE GLUCOSE BLD GHB EST-MCNC: 128 MG/DL
HBA1C MFR BLD: 6.1 % (ref 0–5.6)

## 2019-09-12 ENCOUNTER — NON-PROVIDER VISIT (OUTPATIENT)
Dept: NEUROLOGY | Facility: MEDICAL CENTER | Age: 59
End: 2019-09-12
Payer: COMMERCIAL

## 2019-09-12 DIAGNOSIS — R55 SYNCOPE, UNSPECIFIED SYNCOPE TYPE: ICD-10-CM

## 2019-09-12 PROCEDURE — 95819 EEG AWAKE AND ASLEEP: CPT | Performed by: PSYCHIATRY & NEUROLOGY

## 2019-09-12 NOTE — PROCEDURES
ROUTINE ELECTROENCEPHALOGRAM REPORT      Referring provider: Dr. Yung.     DOS: 9/12/2019 (total recording of 24 minutes)    INDICATION:  Sumeet Buenrostro 59 y.o. male presenting with syncope.     CURRENT ANTIEPILEPTIC REGIMEN: None.     TECHNIQUE: 30 channel routine electroencephalogram (EEG) was performed in accordance with the international 10-20 system. The study was reviewed in bipolar and referential montages. The recording examined the patient during wakeful and drowsy/sleep state(s).     DESCRIPTION OF THE RECORD:  During the wakefulness, the background showed a symmetrical 10 Hz alpha activity posteriorly with amplitude of 70 mV.  There was reactivity to eye closure/opening.  A normal anterior-posterior gradient was noted with faster beta frequencies seen anteriorly.  During drowsiness, theta/delta frequencies were seen.    During the sleep state, background shows diffuse high-amplitude 4-5 Hz delta activity.  Symmetrical high-amplitude sleep spindles and vertex sharps were seen in the leads over the central regions.     ACTIVATION PROCEDURES:   Hyperventilation was performed by the patient for a total of 3 minutes. The technician performing the test noted good effort. This technique failed to produce electroencephalographic changes.     Intermittent Photic stimulation was performed in a stepwise fashion from 1 to 30 Hz and elicited a normal response (photic driving), most noticeable in the posterior leads.      ICTAL AND/OR INTERICTAL FINDINGS:   No focal or generalized epileptiform activity noted. No regional slowing was seen during this routine study.  No clinical events or seizures were reported or recorded during the study.     EKG: sampling of the EKG recording demonstrated sinus rhythm.       INTERPRETATION:  This is a normal routine EEG recording in the awake, drowsy, and sleep state(s).  Clinical correlation is recommended.    Note: A normal EEG does not rule out epilepsy.  If the clinical  suspicion remains high for seizures, a prolonged recording to capture clinical or subclinical events may be helpful.        Jeff Lucero MD   Epilepsy and Neurodiagnostics.   Clinical  of Neurology [facility].   Diplomate in Neurology, Epilepsy, and Electrodiagnostic Medicine.   Office: [phone]  Fax: [phone]

## 2019-10-29 ENCOUNTER — ANTICOAGULATION MONITORING (OUTPATIENT)
Dept: VASCULAR LAB | Facility: MEDICAL CENTER | Age: 59
End: 2019-10-29

## 2019-10-29 NOTE — PROGRESS NOTES
Discharged from [facility].  Pt is no longer anticoagulated. Liyah Uribe, Clinical Pharmacist, CDE, CACP

## 2020-08-03 ENCOUNTER — TELEPHONE (OUTPATIENT)
Dept: URGENT CARE | Facility: PHYSICIAN GROUP | Age: 60
End: 2020-08-03

## 2020-08-03 ENCOUNTER — OFFICE VISIT (OUTPATIENT)
Dept: URGENT CARE | Facility: PHYSICIAN GROUP | Age: 60
End: 2020-08-03
Payer: COMMERCIAL

## 2020-08-03 VITALS
OXYGEN SATURATION: 95 % | TEMPERATURE: 98.8 F | DIASTOLIC BLOOD PRESSURE: 90 MMHG | SYSTOLIC BLOOD PRESSURE: 144 MMHG | HEART RATE: 100 BPM | BODY MASS INDEX: 35.06 KG/M2 | WEIGHT: 288 LBS | RESPIRATION RATE: 18 BRPM
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
## 2020-08-03 DIAGNOSIS — S61.411D LACERATION OF RIGHT HAND WITHOUT FOREIGN BODY, SUBSEQUENT ENCOUNTER: ICD-10-CM

## 2020-08-03 DIAGNOSIS — S61.412A LACERATION OF LEFT HAND WITHOUT FOREIGN BODY, INITIAL ENCOUNTER: ICD-10-CM

## 2020-08-03 PROCEDURE — 12002 RPR S/N/AX/GEN/TRNK2.6-7.5CM: CPT | Performed by: NURSE PRACTITIONER

## 2020-08-03 RX ORDER — TESTOSTERONE CYPIONATE 200 MG/ML
INJECTION, SOLUTION INTRAMUSCULAR
COMMUNITY
Start: 2020-07-24

## 2020-08-03 RX ORDER — AMOXICILLIN AND CLAVULANATE POTASSIUM 875; 125 MG/1; MG/1
1 TABLET, FILM COATED ORAL 2 TIMES DAILY
Qty: 14 TAB | Refills: 0 | Status: SHIPPED | OUTPATIENT
Start: 2020-08-03 | End: 2020-08-10

## 2020-08-03 ASSESSMENT — ENCOUNTER SYMPTOMS
CONSTITUTIONAL NEGATIVE: 1
RESPIRATORY NEGATIVE: 1
NEUROLOGICAL NEGATIVE: 1
CARDIOVASCULAR NEGATIVE: 1
MUSCULOSKELETAL NEGATIVE: 1
TINGLING: 0
WEAKNESS: 0
SENSORY CHANGE: 0

## 2020-08-03 ASSESSMENT — FIBROSIS 4 INDEX: FIB4 SCORE: 0.71

## 2020-08-03 ASSESSMENT — VISUAL ACUITY: OU: 1

## 2020-08-03 NOTE — PROGRESS NOTES
Subjective:     Sumeet Buenrostro is a 60 y.o. male who presents for Laceration       Animal Bite   This is a new problem. Pertinent negatives include no weakness. He has tried nothing for the symptoms.     Patient reports that he was breaking up a fight between his dogs when he got bit at both of his hands.  Has bleeding lacerations and abrasions.  This happened about 30 minutes prior to arrival.  Patient reports that the dogs are up-to-date on their own immunizations.  Patient reports that he last received his tetanus shot 4 months ago.  Takes a baby aspirin 81 mg once a day.    Patient was screened prior to rooming and denied COVID-19 diagnosis or contact with a person who has been diagnosed or is suspected to have COVID-19. During this visit, appropriate PPE was worn, hand hygiene was performed, and the patient and any visitors were masked.     PMH:  has a past medical history of Arthritis, Blood clotting disorder (HCC), Hypertension, and Pulmonary emboli (HCC) (10/2018).    MEDS:   Current Outpatient Medications:   •  aspirin EC (ECOTRIN) 81 MG Tablet Delayed Response, Take 81 mg by mouth every day., Disp: , Rfl:   •  amoxicillin-clavulanate (AUGMENTIN) 875-125 MG Tab, Take 1 Tab by mouth 2 times a day for 7 days., Disp: 14 Tab, Rfl: 0  •  lisinopril (PRINIVIL) 2.5 MG Tab, Take 2.5 mg by mouth every day., Disp: , Rfl:   •  testosterone cypionate (DEPO-TESTOSTERONE) 200 MG/ML Solution injection, , Disp: , Rfl:   •  naproxen (NAPROSYN) 500 MG Tab, Take 500 mg by mouth 2 times a day, with meals., Disp: , Rfl:   •  Cholecalciferol (VITAMIN D) 2000 units Cap, Take 1 Cap by mouth every day., Disp: , Rfl:     ALLERGIES: No Known Allergies    SURGHX:   Past Surgical History:   Procedure Laterality Date   • KNEE REVISION TOTAL Right 3/4/2019    Procedure: OPEN LYSIS OF ADHESIONS,;  Surgeon: Jeffry Christopher M.D.;  Location: SURGERY [facility];  Service: Orthopedics   • SYNOVECTOMY Right 3/4/2019    Procedure: SYNOVECTOMY;  Surgeon: Jeffry Christopher M.D.;  Location: [facility];  Service: Orthopedics   • KNEE MANIPULATION Right 12/28/2018    Procedure: KNEE MANIPULATION;  Surgeon: Jeffry Christopher M.D.;  Location: [facility];  Service: Orthopedics   • JOINT INJECTION DIAGNOSTIC Left 12/28/2018    Procedure: JOINT INJECTION DIAGNOSTIC-   CORTISONE;  Surgeon: Jeffry Christopher M.D.;  Location: [facility];  Service: Orthopedics   • KNEE ARTHROPLASTY TOTAL Right 10/25/2018    Procedure: KNEE ARTHROPLASTY TOTAL-  PROCEED AS INDICATED;  Surgeon: Jeffry Christopher M.D.;  Location: [facility];  Service: Orthopedics   • JOINT INJECTION DIAGNOSTIC Left 10/25/2018    Procedure: JOINT INJECTION DIAGNOSTIC- KNEE CORTISONE INJECTION;  Surgeon: Jeffry Christopher M.D.;  Location: [facility];  Service: Orthopedics   • OTHER ORTHOPEDIC SURGERY  '75    norbert knee     SOCHX:  reports that he has never smoked. His smokeless tobacco use includes snuff and chew. He reports that he does not drink alcohol or use drugs.     FH: Reviewed with patient, not pertinent to this visit.    Review of Systems   Constitutional: Negative.  Negative for malaise/fatigue.   Respiratory: Negative.    Cardiovascular: Negative.    Musculoskeletal: Negative.    Skin:        Dog bites to hands, bleeding lacerations and abrasions   Neurological: Negative.  Negative for tingling, sensory change and weakness.   All other systems reviewed and are negative.    Additional details per HPI.      Objective:     /90 (BP Location: Right arm, Patient Position: Sitting, BP Cuff Size: Large adult)   Pulse 100   Temp 37.1 °C (98.8 °F) (Temporal)   Resp 18   Wt (!) 130.6 kg (288 lb)   SpO2 95%   BMI 35.06 kg/m²     Physical Exam  Vitals signs reviewed.   Constitutional:       General: He is not in acute distress.     Appearance: He is well-developed. He is not  ill-appearing or toxic-appearing.   HENT:      Head: Normocephalic.      Right Ear: External ear normal.      Left Ear: External ear normal.      Nose: Nose normal.   Eyes:      General: Vision grossly intact.      Extraocular Movements: Extraocular movements intact.      Conjunctiva/sclera: Conjunctivae normal.   Neck:      Musculoskeletal: Normal range of motion.   Cardiovascular:      Rate and Rhythm: Normal rate.      Pulses: Normal pulses.   Pulmonary:      Effort: Pulmonary effort is normal. No respiratory distress.   Musculoskeletal: Normal range of motion.         General: No deformity.      Right hand: He exhibits tenderness (Between right thumb and index finger) and laceration. He exhibits normal range of motion, normal capillary refill, no deformity and no swelling. Normal sensation noted. Normal strength noted.      Left hand: He exhibits laceration and swelling. He exhibits normal range of motion, no tenderness, normal capillary refill and no deformity. Normal sensation noted. Normal strength noted.        Hands:    Skin:     General: Skin is warm and dry.      Capillary Refill: Capillary refill takes less than 2 seconds.      Coloration: Skin is not pale.   Neurological:      General: No focal deficit present.      Mental Status: He is alert and oriented to person, place, and time.      Sensory: No sensory deficit.      Motor: No weakness.      Coordination: Coordination normal.   Psychiatric:         Behavior: Behavior normal. Behavior is cooperative.       Assessment/Plan:     1. Dog bite, initial encounter  - amoxicillin-clavulanate (AUGMENTIN) 875-125 MG Tab; Take 1 Tab by mouth 2 times a day for 7 days.  Dispense: 14 Tab; Refill: 0    Patient presents primarily for wound closure of his lacerations. Patient advised that generally dog bite wounds are not closed primarily due to increased risk of infection. Patient does have bleeding at the lacerations not controlled with direct pressure alone.  Patient is currently on aspirin. Risks and benefits of closing dog bite wounds discussed including, but not limited to, blood loss and infectious discussed at length. Patient wishes to proceed with closure of wounds after thorough cleaning and with plan for prophylactic antibiotics.    Procedure: Laceration Repair of Both Hands  - Risks, benefits, methods, and alternatives of primary closure of a wound due to a dog bite reviewed. Risks including bleeding, nerve damage, infection, and poor cosmetic outcome discussed at length.  - Verbal consent received from patient to proceed with laceration repair.  - Area soaked with diluted chlorhexidine solution.  - Area copiously irrigated with NS, wounds explored, no foreign bodies identified.  - Site prepared with Betadine.  - Clean technique with sterile instruments.  - Local anesthesia achieved with a total of 3 mL of 2% lidocaine without epinephrine.  - Left middle finger pad: applied #4 interrupted sutures with 4.0 Ethilon; good wound edge approximation achieved, good hemostasis achieved.  - Dorsal left middle finger abrasions: devitalized tissue debrided with hemostasis achieved with silver nitrate stick.  - Web space between right thumb and right index finger: applied #2 interrupted sutures with 4.0 Ethilon; good wound edge approximation achieved, good hemostasis achieved in conjunction with direct pressure to the area.  - Right lateral thenar eminence: applied #2 interrupted sutures with 4.0 Ethilon; good wound edge approximation achieved, good hemostasis achieved.  - Irrigated copiously with NS.  - Non-adhesive dressing applied.  - There were no procedural complications.  - Patient tolerated procedure well.    - Tetanus UTD.    Patient advised to monitor for signs of infection including, but not limited to, increased redness, warmth, pain, swelling, discharge, or fever. Wound care instructions provided. Advised to protect from further injury. May apply ice packs,  elevate, and take OTC ibuprofen and/or acetaminophen for pain. Suture removal in 7-10 days.    Rx as above sent electronically.     Differential diagnosis, natural history, supportive care, over-the-counter symptom management per 's instructions, close monitoring, and indications for immediate follow-up discussed.     Patient advised to: Return for 1) Symptoms that worsen/don't improve, or go to ER, 2) Follow up with primary care in 7-10 days.    All questions answered. Patient agrees with the plan of care.    Discharge summary provided.

## 2020-08-03 NOTE — LETTER
August 3, 2020         Patient: Sumeet Buenrostro   YOB: 1960   Date of Visit: 8/3/2020           To Whom it May Concern:    Sumeet Buenrostro was seen in my clinic on 8/3/2020 due to injury of his hands. Due to medical necessity, please accommodate or excuse patient from work for up to 7-10 days as needed.     If you have any questions or concerns, please don't hesitate to call.        Sincerely,           YOHANA Mead.  Electronically Signed

## 2020-08-03 NOTE — PATIENT INSTRUCTIONS
Animal Bite, Adult  Animal bites range from mild to serious. An animal bite can result in any of these injuries:  · A scratch.  · A deep, open cut.  · A puncture of the skin.  · A crush injury.  · Tearing away of the skin or a body part.  · A bone injury.  A small bite from a house pet is usually less serious than a bite from a stray or wild animal, such as a raccoon, palma, skunk, or bat. That is because stray and wild animals have a higher risk of carrying a serious infection called rabies, which can be passed to humans through a bite.  What increases the risk?  You are more likely to be bitten by an animal if:  · You are around unfamiliar pets.  · You disturb an animal when it is eating, sleeping, or caring for its babies.  · You are outdoors in a place where small, wild animals roam freely.  What are the signs or symptoms?  Common symptoms of an animal bite include:  · Pain.  · Bleeding.  · Swelling.  · Bruising.  How is this diagnosed?  This condition may be diagnosed based on a physical exam and medical history. Your health care provider will examine your wound and ask for details about the animal and how the bite happened. You may also have tests, such as:  · Blood tests to check for infection.  · X-rays to check for damage to bones or joints.  · Taking a fluid sample from your wound and checking it for infection (culture test).  How is this treated?  Treatment varies depending on the type of animal, where the bite is on your body, and your medical history. Treatment may include:  · Caring for the wound. This often includes cleaning the wound, rinsing out (flushing) the wound with saline solution, and applying a bandage (dressing). In some cases, the wound may be closed with stitches (sutures), staples, skin glue, or adhesive strips.  · Antibiotic medicine to prevent or treat infection. This medicine may be prescribed in pill or ointment form. If the bite area becomes infected, the medicine may be given through  an IV.  · A tetanus shot to prevent tetanus infection.  · Rabies treatment to prevent rabies infection. This will be done if the animal could have rabies.  · Surgery. This may be done if a bite gets infected or if there is damage that needs to be repaired.  Follow these instructions at home:  Wound care    · Follow instructions from your health care provider about how to take care of your wound. Make sure you:  ? Wash your hands with soap and water before you change your dressing. If soap and water are not available, use hand .  ? Change your dressing as told by your health care provider.  ? Leave sutures, skin glue, or adhesive strips in place. These skin closures may need to stay in place for 2 weeks or longer. If adhesive strip edges start to loosen and curl up, you may trim the loose edges. Do not remove adhesive strips completely unless your health care provider tells you to do that.  · Check your wound every day for signs of infection. Check for:  ? More redness, swelling, or pain.  ? More fluid or blood.  ? Warmth.  ? Pus or a bad smell.  Medicines  · Take or apply over-the-counter and prescription medicines only as told by your health care provider.  · If you were prescribed an antibiotic, take or apply it as told by your health care provider. Do not stop using the antibiotic even if your condition improves.  General instructions    · Keep the injured area raised (elevated) above the level of your heart while you are sitting or lying down, if this is possible.  · If directed, put ice on the injured area.  ? Put ice in a plastic bag.  ? Place a towel between your skin and the bag.  ? Leave the ice on for 20 minutes, 2-3 times per day.  · Keep all follow-up visits as told by your health care provider. This is important.  Contact a health care provider if:  · You have more redness, swelling, or pain around your wound.  · Your wound feels warm to the touch.  · You have a fever or chills.  · You have a  general feeling of sickness (malaise).  · You feel nauseous or you vomit.  · You have pain that does not get better.  Get help right away if:  · You have a red streak that leads away from your wound.  · You have non-clear fluid or more blood coming from your wound.  · There is pus or a bad smell coming from your wound.  · You have trouble moving your injured area.  · You have numbness or tingling that extends beyond the wound.  Summary  · Animal bites can range from mild to serious. An animal bite can cause a scratch on the skin, a deep open cut, a puncture of the skin, a crush injury, tearing away of the skin or a body part, or a bone injury.  · Your health care provider will examine your wound and ask for details about the animal and how the bite happened.  · You may also have tests such as a blood test, X-ray, or testing of a fluid sample from your wound (culture test).  · Treatment may include wound care, antibiotic medicine, a tetanus shot, and rabies treatment if the animal could have rabies.  This information is not intended to replace advice given to you by your health care provider. Make sure you discuss any questions you have with your health care provider.  Document Released: 09/04/2012 Document Revised: 12/13/2018 Document Reviewed: 06/28/2018  [employer]vier Patient Education © 2020 [employer]

## 2020-08-04 ENCOUNTER — HOSPITAL ENCOUNTER (OUTPATIENT)
Dept: LAB | Facility: MEDICAL CENTER | Age: 60
End: 2020-08-04
Attending: FAMILY MEDICINE
Payer: COMMERCIAL

## 2020-08-04 ENCOUNTER — HOSPITAL ENCOUNTER (OUTPATIENT)
Dept: LAB | Facility: MEDICAL CENTER | Age: 60
End: 2020-08-04
Attending: PHYSICIAN ASSISTANT
Payer: COMMERCIAL
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
## 2020-08-04 LAB
ALBUMIN SERPL BCP-MCNC: 4.6 G/DL (ref 3.2–4.9)
ALBUMIN/GLOB SERPL: 1.6 G/DL
ALP SERPL-CCNC: 59 U/L (ref 30–99)
ALT SERPL-CCNC: 15 U/L (ref 2–50)
ANION GAP SERPL CALC-SCNC: 13 MMOL/L (ref 7–16)
AST SERPL-CCNC: 20 U/L (ref 12–45)
BASOPHILS # BLD AUTO: 0.3 % (ref 0–1.8)
BASOPHILS # BLD: 0.03 K/UL (ref 0–0.12)
BILIRUB SERPL-MCNC: 0.9 MG/DL (ref 0.1–1.5)
BUN SERPL-MCNC: 17 MG/DL (ref 8–22)
CALCIUM SERPL-MCNC: 9.6 MG/DL (ref 8.5–10.5)
CHLORIDE SERPL-SCNC: 99 MMOL/L (ref 96–112)
CHOLEST SERPL-MCNC: 165 MG/DL (ref 100–199)
CO2 SERPL-SCNC: 23 MMOL/L (ref 20–33)
CREAT SERPL-MCNC: 1.06 MG/DL (ref 0.5–1.4)
EOSINOPHIL # BLD AUTO: 0.05 K/UL (ref 0–0.51)
EOSINOPHIL NFR BLD: 0.5 % (ref 0–6.9)
ERYTHROCYTE [DISTWIDTH] IN BLOOD BY AUTOMATED COUNT: 46.7 FL (ref 35.9–50)
EST. AVERAGE GLUCOSE BLD GHB EST-MCNC: 120 MG/DL
FASTING STATUS PATIENT QL REPORTED: NORMAL
GLOBULIN SER CALC-MCNC: 2.8 G/DL (ref 1.9–3.5)
GLUCOSE SERPL-MCNC: 91 MG/DL (ref 65–99)
HBA1C MFR BLD: 5.8 % (ref 0–5.6)
HBV SURFACE AG SER QL: NORMAL
HCT VFR BLD AUTO: 54.6 % (ref 42–52)
HCV AB SER QL: NORMAL
HDLC SERPL-MCNC: 50 MG/DL
HGB BLD-MCNC: 17.6 G/DL (ref 14–18)
IMM GRANULOCYTES # BLD AUTO: 0.03 K/UL (ref 0–0.11)
IMM GRANULOCYTES NFR BLD AUTO: 0.3 % (ref 0–0.9)
LDLC SERPL CALC-MCNC: 105 MG/DL
LYMPHOCYTES # BLD AUTO: 1.74 K/UL (ref 1–4.8)
LYMPHOCYTES NFR BLD: 16.8 % (ref 22–41)
MCH RBC QN AUTO: 27.9 PG (ref 27–33)
MCHC RBC AUTO-ENTMCNC: 32.2 G/DL (ref 33.7–35.3)
MCV RBC AUTO: 86.7 FL (ref 81.4–97.8)
MONOCYTES # BLD AUTO: 1.07 K/UL (ref 0–0.85)
MONOCYTES NFR BLD AUTO: 10.3 % (ref 0–13.4)
NEUTROPHILS # BLD AUTO: 7.43 K/UL (ref 1.82–7.42)
NEUTROPHILS NFR BLD: 71.8 % (ref 44–72)
NRBC # BLD AUTO: 0 K/UL
NRBC BLD-RTO: 0 /100 WBC
PLATELET # BLD AUTO: 203 K/UL (ref 164–446)
PMV BLD AUTO: 12.7 FL (ref 9–12.9)
POTASSIUM SERPL-SCNC: 4.1 MMOL/L (ref 3.6–5.5)
PROT SERPL-MCNC: 7.4 G/DL (ref 6–8.2)
PSA SERPL-MCNC: 1.45 NG/ML (ref 0–4)
RBC # BLD AUTO: 6.3 M/UL (ref 4.7–6.1)
SODIUM SERPL-SCNC: 135 MMOL/L (ref 135–145)
TESTOST SERPL-MCNC: 582 NG/DL (ref 175–781)
TRIGL SERPL-MCNC: 51 MG/DL (ref 0–149)
WBC # BLD AUTO: 10.4 K/UL (ref 4.8–10.8)

## 2020-08-04 PROCEDURE — 36415 COLL VENOUS BLD VENIPUNCTURE: CPT

## 2020-08-04 PROCEDURE — 84403 ASSAY OF TOTAL TESTOSTERONE: CPT

## 2020-08-04 PROCEDURE — 80061 LIPID PANEL: CPT

## 2020-08-04 PROCEDURE — 80053 COMPREHEN METABOLIC PANEL: CPT

## 2020-08-04 PROCEDURE — 83036 HEMOGLOBIN GLYCOSYLATED A1C: CPT

## 2020-08-04 PROCEDURE — 84153 ASSAY OF PSA TOTAL: CPT

## 2020-08-04 PROCEDURE — 85025 COMPLETE CBC W/AUTO DIFF WBC: CPT

## 2020-08-04 NOTE — TELEPHONE ENCOUNTER
Patient was being sutured by a provider who then stuck himself with a needled. Patient will have blood drawn tomorrow.

## 2020-08-05 LAB
EXPOSED MRN EXMRN: NORMAL
HIV 1+2 AB+HIV1 P24 AG SERPL QL IA: NORMAL

## 2020-08-17 NOTE — PATIENT INSTRUCTIONS
IMPRESSION:    1. Recurrent spells of passing out after right knee surgery-- no more spells after Nov 2018  2. Pulmonary embolism after 1  3. Weight loss 40 lb 3 months after 1 but gained weight recently ( cause unknown)    PLAN/RECOMMENDATIONS:    ________________________________________________________________________      Explain to the patient the patient's spells of passing out might be either   Cardiovascular ( syncope) vs neurogenic ( seizure)--- the first one sounds more likely but the last spell lasted for minutes-- which was kind of unusual  Furthermore, syncope and seizure could happen in the same person sometimes-- like ictal syncope, like two diseases together    ________________________________________________________________________      The patient promises to contact us if any spells of passing out  We will repeat EEG of brain   Observation for now is fine    We also advise the patient not to drive 3 months after any spells of passing out      SIGNATURE:  Kalani Yung      CC:  Candie Cano M.D.      11/2018 MRI of brain --             
clear to auscultation bilaterally/normal/no intercostal retractions/no chest wall tenderness/breath sounds equal/airway patent/respirations non-labored/good air movement

## 2020-08-19 ENCOUNTER — OFFICE VISIT (OUTPATIENT)
Dept: URGENT CARE | Facility: PHYSICIAN GROUP | Age: 60
End: 2020-08-19
Payer: COMMERCIAL

## 2020-08-19 VITALS
RESPIRATION RATE: 16 BRPM | DIASTOLIC BLOOD PRESSURE: 84 MMHG | BODY MASS INDEX: 35.06 KG/M2 | WEIGHT: 288 LBS | SYSTOLIC BLOOD PRESSURE: 128 MMHG | OXYGEN SATURATION: 97 % | TEMPERATURE: 98.5 F | HEART RATE: 84 BPM

## 2020-08-19 DIAGNOSIS — S61.411D LACERATION OF RIGHT HAND WITHOUT FOREIGN BODY, SUBSEQUENT ENCOUNTER: ICD-10-CM

## 2020-08-19 PROCEDURE — 99212 OFFICE O/P EST SF 10 MIN: CPT | Performed by: PHYSICIAN ASSISTANT

## 2020-08-19 RX ORDER — AMOXICILLIN 500 MG/1
CAPSULE ORAL
COMMUNITY
Start: 2020-08-11 | End: 2023-04-10

## 2020-08-19 ASSESSMENT — FIBROSIS 4 INDEX: FIB4 SCORE: 1.5

## 2020-08-19 NOTE — PROGRESS NOTES
Patient is just here for release to go back to work.  He had several lacerations on his hands bilaterally from dog bites that have healed and the sutures have been removed and he feels fine.  However his work is requiring that he get a note to go back to work without restrictions.    Hands have well-healed laceration scars without any signs of infection, has good range of motion, circulation and sensation

## 2020-08-19 NOTE — LETTER
August 19, 2020         Patient: Sumeet Buenrostro   YOB: 1960   Date of Visit: 8/19/2020           To Whom it May Concern:    Sumeet Buenrostro was seen in my clinic on 8/19/2020. He may return to work on 8/20/2020, without restrictions.    If you have any questions or concerns, please don't hesitate to call.        Sincerely,           Hermes Luna P.A.-C.  Electronically Signed

## 2021-02-25 ENCOUNTER — HOSPITAL ENCOUNTER (OUTPATIENT)
Dept: LAB | Facility: MEDICAL CENTER | Age: 61
End: 2021-02-25
Attending: FAMILY MEDICINE
Payer: COMMERCIAL

## 2021-02-25 LAB
ALBUMIN SERPL BCP-MCNC: 4.3 G/DL (ref 3.2–4.9)
ALBUMIN/GLOB SERPL: 1.3 G/DL
ALP SERPL-CCNC: 58 U/L (ref 30–99)
ALT SERPL-CCNC: 19 U/L (ref 2–50)
ANION GAP SERPL CALC-SCNC: 8 MMOL/L (ref 7–16)
AST SERPL-CCNC: 20 U/L (ref 12–45)
BASOPHILS # BLD AUTO: 0.5 % (ref 0–1.8)
BASOPHILS # BLD: 0.03 K/UL (ref 0–0.12)
BILIRUB SERPL-MCNC: 0.6 MG/DL (ref 0.1–1.5)
BUN SERPL-MCNC: 20 MG/DL (ref 8–22)
CALCIUM SERPL-MCNC: 9.7 MG/DL (ref 8.5–10.5)
CHLORIDE SERPL-SCNC: 103 MMOL/L (ref 96–112)
CHOLEST SERPL-MCNC: 176 MG/DL (ref 100–199)
CO2 SERPL-SCNC: 29 MMOL/L (ref 20–33)
CREAT SERPL-MCNC: 1.17 MG/DL (ref 0.5–1.4)
CREAT UR-MCNC: 243.72 MG/DL
EOSINOPHIL # BLD AUTO: 0.12 K/UL (ref 0–0.51)
EOSINOPHIL NFR BLD: 1.9 % (ref 0–6.9)
ERYTHROCYTE [DISTWIDTH] IN BLOOD BY AUTOMATED COUNT: 45.3 FL (ref 35.9–50)
EST. AVERAGE GLUCOSE BLD GHB EST-MCNC: 126 MG/DL
FASTING STATUS PATIENT QL REPORTED: NORMAL
GLOBULIN SER CALC-MCNC: 3.2 G/DL (ref 1.9–3.5)
GLUCOSE SERPL-MCNC: 91 MG/DL (ref 65–99)
HBA1C MFR BLD: 6 % (ref 4–5.6)
HCT VFR BLD AUTO: 56.2 % (ref 42–52)
HDLC SERPL-MCNC: 49 MG/DL
HGB BLD-MCNC: 17.9 G/DL (ref 14–18)
IMM GRANULOCYTES # BLD AUTO: 0.02 K/UL (ref 0–0.11)
IMM GRANULOCYTES NFR BLD AUTO: 0.3 % (ref 0–0.9)
LDLC SERPL CALC-MCNC: 113 MG/DL
LYMPHOCYTES # BLD AUTO: 2.28 K/UL (ref 1–4.8)
LYMPHOCYTES NFR BLD: 35.8 % (ref 22–41)
MCH RBC QN AUTO: 28.1 PG (ref 27–33)
MCHC RBC AUTO-ENTMCNC: 31.9 G/DL (ref 33.7–35.3)
MCV RBC AUTO: 88.1 FL (ref 81.4–97.8)
MICROALBUMIN UR-MCNC: 2.4 MG/DL
MICROALBUMIN/CREAT UR: 10 MG/G (ref 0–30)
MONOCYTES # BLD AUTO: 0.61 K/UL (ref 0–0.85)
MONOCYTES NFR BLD AUTO: 9.6 % (ref 0–13.4)
NEUTROPHILS # BLD AUTO: 3.31 K/UL (ref 1.82–7.42)
NEUTROPHILS NFR BLD: 51.9 % (ref 44–72)
NRBC # BLD AUTO: 0 K/UL
NRBC BLD-RTO: 0 /100 WBC
PLATELET # BLD AUTO: 199 K/UL (ref 164–446)
PMV BLD AUTO: 13.3 FL (ref 9–12.9)
POTASSIUM SERPL-SCNC: 4.9 MMOL/L (ref 3.6–5.5)
PROT SERPL-MCNC: 7.5 G/DL (ref 6–8.2)
PSA SERPL-MCNC: 0.87 NG/ML (ref 0–4)
RBC # BLD AUTO: 6.38 M/UL (ref 4.7–6.1)
SODIUM SERPL-SCNC: 140 MMOL/L (ref 135–145)
TESTOST SERPL-MCNC: 97 NG/DL (ref 175–781)
TRIGL SERPL-MCNC: 68 MG/DL (ref 0–149)
WBC # BLD AUTO: 6.4 K/UL (ref 4.8–10.8)

## 2021-02-25 PROCEDURE — 84403 ASSAY OF TOTAL TESTOSTERONE: CPT

## 2021-02-25 PROCEDURE — 80053 COMPREHEN METABOLIC PANEL: CPT

## 2021-02-25 PROCEDURE — 36415 COLL VENOUS BLD VENIPUNCTURE: CPT

## 2021-02-25 PROCEDURE — 82570 ASSAY OF URINE CREATININE: CPT

## 2021-02-25 PROCEDURE — 85025 COMPLETE CBC W/AUTO DIFF WBC: CPT

## 2021-02-25 PROCEDURE — 83036 HEMOGLOBIN GLYCOSYLATED A1C: CPT

## 2021-02-25 PROCEDURE — 82043 UR ALBUMIN QUANTITATIVE: CPT

## 2021-02-25 PROCEDURE — 84153 ASSAY OF PSA TOTAL: CPT

## 2021-02-25 PROCEDURE — 80061 LIPID PANEL: CPT

## 2021-08-17 ENCOUNTER — HOSPITAL ENCOUNTER (OUTPATIENT)
Facility: MEDICAL CENTER | Age: 61
End: 2021-08-17
Attending: PHYSICIAN ASSISTANT
Payer: COMMERCIAL

## 2021-08-17 ENCOUNTER — OFFICE VISIT (OUTPATIENT)
Dept: URGENT CARE | Facility: PHYSICIAN GROUP | Age: 61
End: 2021-08-17
Payer: COMMERCIAL

## 2021-08-17 VITALS
BODY MASS INDEX: 34.83 KG/M2 | TEMPERATURE: 98.5 F | HEART RATE: 86 BPM | SYSTOLIC BLOOD PRESSURE: 128 MMHG | HEIGHT: 76 IN | WEIGHT: 286 LBS | RESPIRATION RATE: 18 BRPM | DIASTOLIC BLOOD PRESSURE: 84 MMHG | OXYGEN SATURATION: 97 %

## 2021-08-17 DIAGNOSIS — J06.9 UPPER RESPIRATORY TRACT INFECTION, UNSPECIFIED TYPE: ICD-10-CM

## 2021-08-17 DIAGNOSIS — J02.9 SORE THROAT: ICD-10-CM

## 2021-08-17 PROCEDURE — U0003 INFECTIOUS AGENT DETECTION BY NUCLEIC ACID (DNA OR RNA); SEVERE ACUTE RESPIRATORY SYNDROME CORONAVIRUS 2 (SARS-COV-2) (CORONAVIRUS DISEASE [COVID-19]), AMPLIFIED PROBE TECHNIQUE, MAKING USE OF HIGH THROUGHPUT TECHNOLOGIES AS DESCRIBED BY CMS-2020-01-R: HCPCS

## 2021-08-17 PROCEDURE — 99214 OFFICE O/P EST MOD 30 MIN: CPT | Performed by: PHYSICIAN ASSISTANT

## 2021-08-17 PROCEDURE — U0005 INFEC AGEN DETEC AMPLI PROBE: HCPCS

## 2021-08-17 RX ORDER — TADALAFIL 20 MG/1
TABLET ORAL
COMMUNITY
Start: 2021-07-17

## 2021-08-17 ASSESSMENT — FIBROSIS 4 INDEX: FIB4 SCORE: 1.38

## 2021-08-17 NOTE — PROGRESS NOTES
Chief Complaint   Patient presents with   • Cough     x 2 days   • Pharyngitis   • Headache   • Fever   • Dizziness       HISTORY OF PRESENT ILLNESS: Patient is a 61 y.o. male who presents today because he has a 2-day history of cough, headache, sore throat, fever, dizziness.  He has been taking some over-the-counter medications for symptoms without improvement.  He is not vaccinated for Covid, does have Covid positive coworkers    Patient Active Problem List    Diagnosis Date Noted   • Seizure cerebral ([facility]) 05/01/2019   • Deep vein thrombosis ([facility]) [I82.409] 12/14/2018   • Diabetes ([facility]) 11/10/2018   • Acute respiratory failure with hypoxia ([facility]) 11/02/2018   • Pulmonary embolism ([facility]) 11/01/2018   • Total knee replacement status 11/01/2018   • Syncope 11/01/2018   • HTN (hypertension) 11/01/2018   • Leucocytosis 11/01/2018   • Azotemia 11/01/2018   • Obesity (BMI 35.0-39.9 without comorbidity) ([facility]) 10/04/2017       Allergies:Patient has no known allergies.    Current Outpatient Medications Ordered in Epic   Medication Sig Dispense Refill   • tadalafil (CIALIS) 20 MG tablet      • aspirin EC (ECOTRIN) 81 MG Tablet Delayed Response Take 81 mg by mouth every day.     • testosterone cypionate (DEPO-TESTOSTERONE) 200 MG/ML Solution injection      • lisinopril (PRINIVIL) 2.5 MG Tab Take 2.5 mg by mouth every day.     • Cholecalciferol (VITAMIN D) 2000 units Cap Take 1 Cap by mouth every day.     • amoxicillin (AMOXIL) 500 MG Cap TAKE 4 CAPS BY MOUTH 1 HOUR PRIOR TO APPT (Patient not taking: Reported on 8/17/2021)     • naproxen (NAPROSYN) 500 MG Tab Take 500 mg by mouth 2 times a day, with meals. (Patient not taking: Reported on 8/17/2021)       No current [facility]-ordered facility-administered medications on file.       Past Medical History:   Diagnosis Date   • Arthritis     knees   • Blood clotting disorder ([facility])     small blood clots in each lung post knee surgery 10/2018   • Hypertension    • Pulmonary emboli ([facility])  "10/2018       Social History     Tobacco Use   • Smoking status: Never Smoker   • Smokeless tobacco: Current User     Types: Snuff, Chew   Substance Use Topics   • Alcohol use: No   • Drug use: No       No family status information on file.   History reviewed. No pertinent family history.    ROS:  Review of Systems   Constitutional: Positive for fever, chills, myalgias and malaise/fatigue.   HENT: Negative for ear pain, nosebleeds, positive for nasal sinus congestion, sore throat and no neck pain.    Eyes: Negative for blurred vision.   Respiratory: Positive for cough, no sputum production, shortness of breath and wheezing.    Cardiovascular: Negative for chest pain, palpitations, orthopnea and leg swelling.   Gastrointestinal: Negative for heartburn, nausea, vomiting and abdominal pain.   Genitourinary: Negative for dysuria, urgency and frequency.     Exam:  /84   Pulse 86   Temp 36.9 °C (98.5 °F) (Temporal)   Resp 18   Ht 1.93 m (6' 4\")   Wt (!) 130 kg (286 lb)   SpO2 97%   General:  Well nourished, well developed male in NAD  Head:Normocephalic, atraumatic  Eyes: PERRLA, EOM within normal limits, no conjunctival injection, no scleral icterus, visual fields and acuity grossly intact.  Ears: Normal shape and symmetry, no tenderness, no discharge. External canals are without any significant edema or erythema. Tympanic membranes are without any inflammation, no effusion. Gross auditory acuity is intact  Nose: Symmetrical without tenderness, no discharge.  Mouth: reasonable hygiene, no erythema exudates or tonsillar enlargement.  Neck: no masses, range of motion within normal limits, no tracheal deviation. No obvious thyroid enlargement.  Pulmonary: chest is symmetrical with respiration, no wheezes, crackles, or rhonchi.  Cardiovascular: regular rate and rhythm without murmurs, rubs, or gallops.  Extremities: no clubbing, cyanosis, or edema.    Strep negative    Please note that this dictation was created " using voice recognition software. I have made every reasonable attempt to correct obvious errors, but I expect that there are errors of grammar and possibly content that I did not discover before finalizing the note.    Assessment/Plan:  1. Upper respiratory tract infection, unspecified type  SARS-CoV-2 PCR (24 hour In-House): Collect NP swab in VTM   2. Sore throat  POCT Rapid Strep A    discussed over-the-counter symptomatic relief, strict isolation until Covid test returns.      Followup with primary care in the next 7-10 days if not significantly improving, return to the urgent care or go to the emergency room sooner for any worsening of symptoms.

## 2021-08-18 DIAGNOSIS — J06.9 UPPER RESPIRATORY TRACT INFECTION, UNSPECIFIED TYPE: ICD-10-CM

## 2021-08-19 LAB
SARS-COV-2 RNA RESP QL NAA+PROBE: NOTDETECTED
SPECIMEN SOURCE: NORMAL

## 2021-08-31 ENCOUNTER — HOSPITAL ENCOUNTER (OUTPATIENT)
Dept: LAB | Facility: MEDICAL CENTER | Age: 61
End: 2021-08-31
Attending: FAMILY MEDICINE
Payer: COMMERCIAL

## 2021-08-31 LAB
ALBUMIN SERPL BCP-MCNC: 4.3 G/DL (ref 3.2–4.9)
ALBUMIN/GLOB SERPL: 1.8 G/DL
ALP SERPL-CCNC: 71 U/L (ref 30–99)
ALT SERPL-CCNC: 25 U/L (ref 2–50)
ANION GAP SERPL CALC-SCNC: 10 MMOL/L (ref 7–16)
AST SERPL-CCNC: 18 U/L (ref 12–45)
BASOPHILS # BLD AUTO: 0.5 % (ref 0–1.8)
BASOPHILS # BLD: 0.03 K/UL (ref 0–0.12)
BILIRUB SERPL-MCNC: 0.5 MG/DL (ref 0.1–1.5)
BUN SERPL-MCNC: 11 MG/DL (ref 8–22)
CALCIUM SERPL-MCNC: 9.1 MG/DL (ref 8.5–10.5)
CHLORIDE SERPL-SCNC: 103 MMOL/L (ref 96–112)
CHOLEST SERPL-MCNC: 160 MG/DL (ref 100–199)
CO2 SERPL-SCNC: 24 MMOL/L (ref 20–33)
CREAT SERPL-MCNC: 1.07 MG/DL (ref 0.5–1.4)
EOSINOPHIL # BLD AUTO: 0.04 K/UL (ref 0–0.51)
EOSINOPHIL NFR BLD: 0.7 % (ref 0–6.9)
ERYTHROCYTE [DISTWIDTH] IN BLOOD BY AUTOMATED COUNT: 46 FL (ref 35.9–50)
EST. AVERAGE GLUCOSE BLD GHB EST-MCNC: 128 MG/DL
FASTING STATUS PATIENT QL REPORTED: NORMAL
GLOBULIN SER CALC-MCNC: 2.4 G/DL (ref 1.9–3.5)
GLUCOSE SERPL-MCNC: 100 MG/DL (ref 65–99)
HBA1C MFR BLD: 6.1 % (ref 4–5.6)
HCT VFR BLD AUTO: 56.5 % (ref 42–52)
HDLC SERPL-MCNC: 34 MG/DL
HGB BLD-MCNC: 18.6 G/DL (ref 14–18)
IMM GRANULOCYTES # BLD AUTO: 0.05 K/UL (ref 0–0.11)
IMM GRANULOCYTES NFR BLD AUTO: 0.8 % (ref 0–0.9)
LDLC SERPL CALC-MCNC: 110 MG/DL
LYMPHOCYTES # BLD AUTO: 1.31 K/UL (ref 1–4.8)
LYMPHOCYTES NFR BLD: 22 % (ref 22–41)
MCH RBC QN AUTO: 27.9 PG (ref 27–33)
MCHC RBC AUTO-ENTMCNC: 32.9 G/DL (ref 33.7–35.3)
MCV RBC AUTO: 84.8 FL (ref 81.4–97.8)
MONOCYTES # BLD AUTO: 0.62 K/UL (ref 0–0.85)
MONOCYTES NFR BLD AUTO: 10.4 % (ref 0–13.4)
NEUTROPHILS # BLD AUTO: 3.9 K/UL (ref 1.82–7.42)
NEUTROPHILS NFR BLD: 65.6 % (ref 44–72)
NRBC # BLD AUTO: 0 K/UL
NRBC BLD-RTO: 0 /100 WBC
PLATELET # BLD AUTO: 203 K/UL (ref 164–446)
PMV BLD AUTO: 12.6 FL (ref 9–12.9)
POTASSIUM SERPL-SCNC: 4 MMOL/L (ref 3.6–5.5)
PROT SERPL-MCNC: 6.7 G/DL (ref 6–8.2)
RBC # BLD AUTO: 6.66 M/UL (ref 4.7–6.1)
SODIUM SERPL-SCNC: 137 MMOL/L (ref 135–145)
TRIGL SERPL-MCNC: 79 MG/DL (ref 0–149)
WBC # BLD AUTO: 6 K/UL (ref 4.8–10.8)

## 2021-08-31 PROCEDURE — 80053 COMPREHEN METABOLIC PANEL: CPT

## 2021-08-31 PROCEDURE — 85025 COMPLETE CBC W/AUTO DIFF WBC: CPT

## 2021-08-31 PROCEDURE — 83036 HEMOGLOBIN GLYCOSYLATED A1C: CPT

## 2021-08-31 PROCEDURE — 36415 COLL VENOUS BLD VENIPUNCTURE: CPT

## 2021-08-31 PROCEDURE — 80061 LIPID PANEL: CPT

## 2022-12-05 ENCOUNTER — HOSPITAL ENCOUNTER (OUTPATIENT)
Dept: LAB | Facility: MEDICAL CENTER | Age: 62
End: 2022-12-05
Attending: FAMILY MEDICINE
Payer: COMMERCIAL

## 2022-12-05 LAB
ALBUMIN SERPL BCP-MCNC: 4.4 G/DL (ref 3.2–4.9)
ALBUMIN/GLOB SERPL: 1.8 G/DL
ALP SERPL-CCNC: 71 U/L (ref 30–99)
ALT SERPL-CCNC: 17 U/L (ref 2–50)
ANION GAP SERPL CALC-SCNC: 9 MMOL/L (ref 7–16)
AST SERPL-CCNC: 21 U/L (ref 12–45)
BASOPHILS # BLD AUTO: 1.1 % (ref 0–1.8)
BASOPHILS # BLD: 0.08 K/UL (ref 0–0.12)
BILIRUB SERPL-MCNC: 0.3 MG/DL (ref 0.1–1.5)
BUN SERPL-MCNC: 16 MG/DL (ref 8–22)
CALCIUM SERPL-MCNC: 9.4 MG/DL (ref 8.5–10.5)
CHLORIDE SERPL-SCNC: 106 MMOL/L (ref 96–112)
CHOLEST SERPL-MCNC: 168 MG/DL (ref 100–199)
CO2 SERPL-SCNC: 26 MMOL/L (ref 20–33)
CREAT SERPL-MCNC: 1.17 MG/DL (ref 0.5–1.4)
EOSINOPHIL # BLD AUTO: 0.14 K/UL (ref 0–0.51)
EOSINOPHIL NFR BLD: 2 % (ref 0–6.9)
ERYTHROCYTE [DISTWIDTH] IN BLOOD BY AUTOMATED COUNT: 47.6 FL (ref 35.9–50)
FASTING STATUS PATIENT QL REPORTED: NORMAL
GFR SERPLBLD CREATININE-BSD FMLA CKD-EPI: 71 ML/MIN/1.73 M 2
GLOBULIN SER CALC-MCNC: 2.5 G/DL (ref 1.9–3.5)
GLUCOSE SERPL-MCNC: 105 MG/DL (ref 65–99)
HCT VFR BLD AUTO: 53.5 % (ref 42–52)
HDLC SERPL-MCNC: 48 MG/DL
HGB BLD-MCNC: 17.4 G/DL (ref 14–18)
IMM GRANULOCYTES # BLD AUTO: 0.06 K/UL (ref 0–0.11)
IMM GRANULOCYTES NFR BLD AUTO: 0.9 % (ref 0–0.9)
LDLC SERPL CALC-MCNC: 109 MG/DL
LYMPHOCYTES # BLD AUTO: 1.69 K/UL (ref 1–4.8)
LYMPHOCYTES NFR BLD: 24 % (ref 22–41)
MCH RBC QN AUTO: 28.2 PG (ref 27–33)
MCHC RBC AUTO-ENTMCNC: 32.5 G/DL (ref 33.7–35.3)
MCV RBC AUTO: 86.6 FL (ref 81.4–97.8)
MONOCYTES # BLD AUTO: 0.6 K/UL (ref 0–0.85)
MONOCYTES NFR BLD AUTO: 8.5 % (ref 0–13.4)
NEUTROPHILS # BLD AUTO: 4.47 K/UL (ref 1.82–7.42)
NEUTROPHILS NFR BLD: 63.5 % (ref 44–72)
NRBC # BLD AUTO: 0 K/UL
NRBC BLD-RTO: 0 /100 WBC
PLATELET # BLD AUTO: 180 K/UL (ref 164–446)
PMV BLD AUTO: 11.9 FL (ref 9–12.9)
POTASSIUM SERPL-SCNC: 4.5 MMOL/L (ref 3.6–5.5)
PROT SERPL-MCNC: 6.9 G/DL (ref 6–8.2)
PSA SERPL-MCNC: 2.41 NG/ML (ref 0–4)
RBC # BLD AUTO: 6.18 M/UL (ref 4.7–6.1)
SODIUM SERPL-SCNC: 141 MMOL/L (ref 135–145)
TRIGL SERPL-MCNC: 55 MG/DL (ref 0–149)
WBC # BLD AUTO: 7 K/UL (ref 4.8–10.8)

## 2022-12-05 PROCEDURE — 84402 ASSAY OF FREE TESTOSTERONE: CPT

## 2022-12-05 PROCEDURE — 85025 COMPLETE CBC W/AUTO DIFF WBC: CPT

## 2022-12-05 PROCEDURE — 84403 ASSAY OF TOTAL TESTOSTERONE: CPT

## 2022-12-05 PROCEDURE — 84153 ASSAY OF PSA TOTAL: CPT

## 2022-12-05 PROCEDURE — 80053 COMPREHEN METABOLIC PANEL: CPT

## 2022-12-05 PROCEDURE — 80061 LIPID PANEL: CPT

## 2022-12-05 PROCEDURE — 36415 COLL VENOUS BLD VENIPUNCTURE: CPT

## 2022-12-05 PROCEDURE — 84270 ASSAY OF SEX HORMONE GLOBUL: CPT

## 2022-12-07 LAB
SHBG SERPL-SCNC: 35 NMOL/L (ref 19–76)
TESTOST FREE MFR SERPL: 2 % (ref 1.6–2.9)
TESTOST FREE SERPL-MCNC: 147 PG/ML (ref 47–244)
TESTOST SERPL-MCNC: 750 NG/DL (ref 300–720)

## 2023-04-10 PROBLEM — M17.9 OSTEOARTHRITIS, KNEE: Status: ACTIVE | Noted: 2023-04-10

## 2023-04-27 ENCOUNTER — HOSPITAL ENCOUNTER (OUTPATIENT)
Dept: LAB | Facility: MEDICAL CENTER | Age: 63
End: 2023-04-27
Attending: FAMILY MEDICINE
Payer: COMMERCIAL

## 2023-04-27 ENCOUNTER — HOSPITAL ENCOUNTER (OUTPATIENT)
Dept: LAB | Facility: MEDICAL CENTER | Age: 63
End: 2023-04-27
Attending: STUDENT IN AN ORGANIZED HEALTH CARE EDUCATION/TRAINING PROGRAM
Payer: COMMERCIAL

## 2023-04-27 LAB
ALBUMIN SERPL BCP-MCNC: 4.4 G/DL (ref 3.2–4.9)
ALBUMIN/GLOB SERPL: 1.7 G/DL
ALP SERPL-CCNC: 69 U/L (ref 30–99)
ALT SERPL-CCNC: 24 U/L (ref 2–50)
ANION GAP SERPL CALC-SCNC: 12 MMOL/L (ref 7–16)
AST SERPL-CCNC: 21 U/L (ref 12–45)
BASOPHILS # BLD AUTO: 0.4 % (ref 0–1.8)
BASOPHILS # BLD: 0.03 K/UL (ref 0–0.12)
BILIRUB SERPL-MCNC: 0.4 MG/DL (ref 0.1–1.5)
BUN SERPL-MCNC: 24 MG/DL (ref 8–22)
CALCIUM ALBUM COR SERPL-MCNC: 8.8 MG/DL (ref 8.5–10.5)
CALCIUM SERPL-MCNC: 9.1 MG/DL (ref 8.5–10.5)
CHLORIDE SERPL-SCNC: 104 MMOL/L (ref 96–112)
CHOLEST SERPL-MCNC: 189 MG/DL (ref 100–199)
CO2 SERPL-SCNC: 23 MMOL/L (ref 20–33)
CREAT SERPL-MCNC: 1 MG/DL (ref 0.5–1.4)
EOSINOPHIL # BLD AUTO: 0.23 K/UL (ref 0–0.51)
EOSINOPHIL NFR BLD: 2.8 % (ref 0–6.9)
ERYTHROCYTE [DISTWIDTH] IN BLOOD BY AUTOMATED COUNT: 48.7 FL (ref 35.9–50)
EST. AVERAGE GLUCOSE BLD GHB EST-MCNC: 123 MG/DL
FASTING STATUS PATIENT QL REPORTED: NORMAL
GFR SERPLBLD CREATININE-BSD FMLA CKD-EPI: 85 ML/MIN/1.73 M 2
GLOBULIN SER CALC-MCNC: 2.6 G/DL (ref 1.9–3.5)
GLUCOSE SERPL-MCNC: 100 MG/DL (ref 65–99)
HBA1C MFR BLD: 5.9 % (ref 4–5.6)
HCT VFR BLD AUTO: 52.6 % (ref 42–52)
HDLC SERPL-MCNC: 44 MG/DL
HGB BLD-MCNC: 17.3 G/DL (ref 14–18)
IMM GRANULOCYTES # BLD AUTO: 0.06 K/UL (ref 0–0.11)
IMM GRANULOCYTES NFR BLD AUTO: 0.7 % (ref 0–0.9)
LDLC SERPL CALC-MCNC: 133 MG/DL
LYMPHOCYTES # BLD AUTO: 1.94 K/UL (ref 1–4.8)
LYMPHOCYTES NFR BLD: 23.6 % (ref 22–41)
MCH RBC QN AUTO: 28.2 PG (ref 27–33)
MCHC RBC AUTO-ENTMCNC: 32.9 G/DL (ref 33.7–35.3)
MCV RBC AUTO: 85.7 FL (ref 81.4–97.8)
MONOCYTES # BLD AUTO: 0.82 K/UL (ref 0–0.85)
MONOCYTES NFR BLD AUTO: 10 % (ref 0–13.4)
NEUTROPHILS # BLD AUTO: 5.14 K/UL (ref 1.82–7.42)
NEUTROPHILS NFR BLD: 62.5 % (ref 44–72)
NRBC # BLD AUTO: 0 K/UL
NRBC BLD-RTO: 0 /100 WBC
PLATELET # BLD AUTO: 177 K/UL (ref 164–446)
PMV BLD AUTO: 12.8 FL (ref 9–12.9)
POTASSIUM SERPL-SCNC: 4.4 MMOL/L (ref 3.6–5.5)
PROT SERPL-MCNC: 7 G/DL (ref 6–8.2)
RBC # BLD AUTO: 6.14 M/UL (ref 4.7–6.1)
SODIUM SERPL-SCNC: 139 MMOL/L (ref 135–145)
TRIGL SERPL-MCNC: 61 MG/DL (ref 0–149)
WBC # BLD AUTO: 8.2 K/UL (ref 4.8–10.8)

## 2023-04-27 PROCEDURE — 80053 COMPREHEN METABOLIC PANEL: CPT

## 2023-04-27 PROCEDURE — 80061 LIPID PANEL: CPT

## 2023-04-27 PROCEDURE — 85025 COMPLETE CBC W/AUTO DIFF WBC: CPT

## 2023-04-27 PROCEDURE — 83036 HEMOGLOBIN GLYCOSYLATED A1C: CPT

## 2023-04-27 PROCEDURE — 36415 COLL VENOUS BLD VENIPUNCTURE: CPT

## 2023-07-14 PROBLEM — M24.662 ARTHROFIBROSIS OF KNEE JOINT, LEFT: Status: ACTIVE | Noted: 2023-07-14

## 2024-05-15 ENCOUNTER — HOSPITAL ENCOUNTER (OUTPATIENT)
Dept: LAB | Facility: MEDICAL CENTER | Age: 64
End: 2024-05-15
Attending: FAMILY MEDICINE
Payer: COMMERCIAL

## 2024-05-15 LAB
25(OH)D3 SERPL-MCNC: 62 NG/ML (ref 30–100)
ALBUMIN SERPL BCP-MCNC: 4.4 G/DL (ref 3.2–4.9)
ALBUMIN/GLOB SERPL: 1.9 G/DL
ALP SERPL-CCNC: 77 U/L (ref 30–99)
ALT SERPL-CCNC: 18 U/L (ref 2–50)
ANION GAP SERPL CALC-SCNC: 11 MMOL/L (ref 7–16)
AST SERPL-CCNC: 17 U/L (ref 12–45)
BASOPHILS # BLD AUTO: 0.8 % (ref 0–1.8)
BASOPHILS # BLD: 0.06 K/UL (ref 0–0.12)
BILIRUB SERPL-MCNC: 0.5 MG/DL (ref 0.1–1.5)
BUN SERPL-MCNC: 17 MG/DL (ref 8–22)
CALCIUM ALBUM COR SERPL-MCNC: 8.9 MG/DL (ref 8.5–10.5)
CALCIUM SERPL-MCNC: 9.2 MG/DL (ref 8.5–10.5)
CHLORIDE SERPL-SCNC: 105 MMOL/L (ref 96–112)
CHOLEST SERPL-MCNC: 178 MG/DL (ref 100–199)
CO2 SERPL-SCNC: 24 MMOL/L (ref 20–33)
CREAT SERPL-MCNC: 1.04 MG/DL (ref 0.5–1.4)
CREAT UR-MCNC: 144.93 MG/DL
EOSINOPHIL # BLD AUTO: 0.2 K/UL (ref 0–0.51)
EOSINOPHIL NFR BLD: 2.8 % (ref 0–6.9)
ERYTHROCYTE [DISTWIDTH] IN BLOOD BY AUTOMATED COUNT: 47.8 FL (ref 35.9–50)
EST. AVERAGE GLUCOSE BLD GHB EST-MCNC: 126 MG/DL
FASTING STATUS PATIENT QL REPORTED: NORMAL
GFR SERPLBLD CREATININE-BSD FMLA CKD-EPI: 81 ML/MIN/1.73 M 2
GLOBULIN SER CALC-MCNC: 2.3 G/DL (ref 1.9–3.5)
GLUCOSE SERPL-MCNC: 106 MG/DL (ref 65–99)
HBA1C MFR BLD: 6 % (ref 4–5.6)
HCT VFR BLD AUTO: 53.2 % (ref 42–52)
HDLC SERPL-MCNC: 39 MG/DL
HGB BLD-MCNC: 17.5 G/DL (ref 14–18)
IMM GRANULOCYTES # BLD AUTO: 0.08 K/UL (ref 0–0.11)
IMM GRANULOCYTES NFR BLD AUTO: 1.1 % (ref 0–0.9)
LDLC SERPL CALC-MCNC: 122 MG/DL
LYMPHOCYTES # BLD AUTO: 1.91 K/UL (ref 1–4.8)
LYMPHOCYTES NFR BLD: 26.6 % (ref 22–41)
MCH RBC QN AUTO: 27.4 PG (ref 27–33)
MCHC RBC AUTO-ENTMCNC: 32.9 G/DL (ref 32.3–36.5)
MCV RBC AUTO: 83.3 FL (ref 81.4–97.8)
MICROALBUMIN UR-MCNC: 6.6 MG/DL
MICROALBUMIN/CREAT UR: 46 MG/G (ref 0–30)
MONOCYTES # BLD AUTO: 0.62 K/UL (ref 0–0.85)
MONOCYTES NFR BLD AUTO: 8.6 % (ref 0–13.4)
NEUTROPHILS # BLD AUTO: 4.3 K/UL (ref 1.82–7.42)
NEUTROPHILS NFR BLD: 60.1 % (ref 44–72)
NRBC # BLD AUTO: 0 K/UL
NRBC BLD-RTO: 0 /100 WBC (ref 0–0.2)
PLATELET # BLD AUTO: 216 K/UL (ref 164–446)
PMV BLD AUTO: 12.1 FL (ref 9–12.9)
POTASSIUM SERPL-SCNC: 4.5 MMOL/L (ref 3.6–5.5)
PROT SERPL-MCNC: 6.7 G/DL (ref 6–8.2)
PSA SERPL-MCNC: 2.86 NG/ML (ref 0–4)
RBC # BLD AUTO: 6.39 M/UL (ref 4.7–6.1)
SODIUM SERPL-SCNC: 140 MMOL/L (ref 135–145)
TRIGL SERPL-MCNC: 84 MG/DL (ref 0–149)
WBC # BLD AUTO: 7.2 K/UL (ref 4.8–10.8)

## 2024-05-16 LAB — TESTOST SERPL-MCNC: 697 NG/DL (ref 175–781)

## 2024-10-24 NOTE — RESPIRATORY CARE
COPD EDUCATION by COPD CLINICAL EDUCATOR  11/11/2018 at 6:11 AM by Jennifer Sosa     Patient reviewed by COPD education team. Patient does not qualify for COPD program.  
negative...

## 2024-11-04 ENCOUNTER — HOSPITAL ENCOUNTER (OUTPATIENT)
Dept: LAB | Facility: MEDICAL CENTER | Age: 64
End: 2024-11-04
Attending: FAMILY MEDICINE
Payer: COMMERCIAL

## 2024-11-04 LAB
CHOLEST SERPL-MCNC: 165 MG/DL (ref 100–199)
EST. AVERAGE GLUCOSE BLD GHB EST-MCNC: 128 MG/DL
FASTING STATUS PATIENT QL REPORTED: NORMAL
HBA1C MFR BLD: 6.1 % (ref 4–5.6)
HDLC SERPL-MCNC: 42 MG/DL
LDLC SERPL CALC-MCNC: 113 MG/DL
TRIGL SERPL-MCNC: 50 MG/DL (ref 0–149)

## 2024-11-04 PROCEDURE — 36415 COLL VENOUS BLD VENIPUNCTURE: CPT

## 2024-11-04 PROCEDURE — 80061 LIPID PANEL: CPT

## 2024-11-04 PROCEDURE — 83036 HEMOGLOBIN GLYCOSYLATED A1C: CPT

## 2025-04-07 ENCOUNTER — HOSPITAL ENCOUNTER (OUTPATIENT)
Dept: LAB | Facility: MEDICAL CENTER | Age: 65
End: 2025-04-07
Attending: FAMILY MEDICINE
Payer: COMMERCIAL

## 2025-04-07 LAB
BASOPHILS # BLD AUTO: 0.4 % (ref 0–1.8)
BASOPHILS # BLD: 0.03 K/UL (ref 0–0.12)
EOSINOPHIL # BLD AUTO: 0.14 K/UL (ref 0–0.51)
EOSINOPHIL NFR BLD: 1.8 % (ref 0–6.9)
ERYTHROCYTE [DISTWIDTH] IN BLOOD BY AUTOMATED COUNT: 48.6 FL (ref 35.9–50)
EST. AVERAGE GLUCOSE BLD GHB EST-MCNC: 128 MG/DL
HBA1C MFR BLD: 6.1 % (ref 4–5.6)
HCT VFR BLD AUTO: 54.8 % (ref 42–52)
HGB BLD-MCNC: 17.5 G/DL (ref 14–18)
IMM GRANULOCYTES # BLD AUTO: 0.1 K/UL (ref 0–0.11)
IMM GRANULOCYTES NFR BLD AUTO: 1.3 % (ref 0–0.9)
LYMPHOCYTES # BLD AUTO: 2.05 K/UL (ref 1–4.8)
LYMPHOCYTES NFR BLD: 26.6 % (ref 22–41)
MCH RBC QN AUTO: 27.4 PG (ref 27–33)
MCHC RBC AUTO-ENTMCNC: 31.9 G/DL (ref 32.3–36.5)
MCV RBC AUTO: 85.8 FL (ref 81.4–97.8)
MONOCYTES # BLD AUTO: 0.8 K/UL (ref 0–0.85)
MONOCYTES NFR BLD AUTO: 10.4 % (ref 0–13.4)
NEUTROPHILS # BLD AUTO: 4.6 K/UL (ref 1.82–7.42)
NEUTROPHILS NFR BLD: 59.5 % (ref 44–72)
NRBC # BLD AUTO: 0 K/UL
NRBC BLD-RTO: 0 /100 WBC (ref 0–0.2)
PLATELET # BLD AUTO: 203 K/UL (ref 164–446)
PMV BLD AUTO: 13.2 FL (ref 9–12.9)
RBC # BLD AUTO: 6.39 M/UL (ref 4.7–6.1)
WBC # BLD AUTO: 7.7 K/UL (ref 4.8–10.8)

## 2025-04-07 PROCEDURE — 83036 HEMOGLOBIN GLYCOSYLATED A1C: CPT

## 2025-04-07 PROCEDURE — 80053 COMPREHEN METABOLIC PANEL: CPT

## 2025-04-07 PROCEDURE — 82043 UR ALBUMIN QUANTITATIVE: CPT

## 2025-04-07 PROCEDURE — 84153 ASSAY OF PSA TOTAL: CPT

## 2025-04-07 PROCEDURE — 80061 LIPID PANEL: CPT

## 2025-04-07 PROCEDURE — 84403 ASSAY OF TOTAL TESTOSTERONE: CPT

## 2025-04-07 PROCEDURE — 36415 COLL VENOUS BLD VENIPUNCTURE: CPT

## 2025-04-07 PROCEDURE — 82570 ASSAY OF URINE CREATININE: CPT

## 2025-04-07 PROCEDURE — 85025 COMPLETE CBC W/AUTO DIFF WBC: CPT

## 2025-04-07 PROCEDURE — 84270 ASSAY OF SEX HORMONE GLOBUL: CPT

## 2025-04-07 PROCEDURE — 84402 ASSAY OF FREE TESTOSTERONE: CPT

## 2025-04-08 LAB
ALBUMIN SERPL BCP-MCNC: 4.3 G/DL (ref 3.2–4.9)
ALBUMIN/GLOB SERPL: 1.6 G/DL
ALP SERPL-CCNC: 62 U/L (ref 30–99)
ALT SERPL-CCNC: 25 U/L (ref 2–50)
ANION GAP SERPL CALC-SCNC: 11 MMOL/L (ref 7–16)
AST SERPL-CCNC: 22 U/L (ref 12–45)
BILIRUB SERPL-MCNC: 0.6 MG/DL (ref 0.1–1.5)
BUN SERPL-MCNC: 19 MG/DL (ref 8–22)
CALCIUM ALBUM COR SERPL-MCNC: 9 MG/DL (ref 8.5–10.5)
CALCIUM SERPL-MCNC: 9.2 MG/DL (ref 8.5–10.5)
CHLORIDE SERPL-SCNC: 102 MMOL/L (ref 96–112)
CHOLEST SERPL-MCNC: 177 MG/DL (ref 100–199)
CO2 SERPL-SCNC: 24 MMOL/L (ref 20–33)
CREAT SERPL-MCNC: 1.13 MG/DL (ref 0.5–1.4)
CREAT UR-MCNC: 157 MG/DL
FASTING STATUS PATIENT QL REPORTED: NORMAL
GFR SERPLBLD CREATININE-BSD FMLA CKD-EPI: 73 ML/MIN/1.73 M 2
GLOBULIN SER CALC-MCNC: 2.7 G/DL (ref 1.9–3.5)
GLUCOSE SERPL-MCNC: 101 MG/DL (ref 65–99)
HDLC SERPL-MCNC: 42 MG/DL
LDLC SERPL CALC-MCNC: 119 MG/DL
MICROALBUMIN UR-MCNC: 5.3 MG/DL
MICROALBUMIN/CREAT UR: 34 MG/G (ref 0–30)
POTASSIUM SERPL-SCNC: 4.5 MMOL/L (ref 3.6–5.5)
PROT SERPL-MCNC: 7 G/DL (ref 6–8.2)
PSA SERPL DL<=0.01 NG/ML-MCNC: 3.16 NG/ML (ref 0–4)
SODIUM SERPL-SCNC: 137 MMOL/L (ref 135–145)
TRIGL SERPL-MCNC: 80 MG/DL (ref 0–149)

## 2025-04-09 LAB
SHBG SERPL-SCNC: 36 NMOL/L (ref 19–76)
TESTOST FREE MFR SERPL: 1.8 % (ref 1.6–2.9)
TESTOST FREE SERPL-MCNC: 103 PG/ML (ref 47–244)
TESTOST SERPL-MCNC: 560 NG/DL (ref 300–720)

## (undated) DEVICE — TUBE CONNECTING SUCTION - CLEAR PLASTIC STERILE 72 IN (50EA/CA)

## (undated) DEVICE — WATER IRRIGATION STERILE 1000ML (12EA/CA)

## (undated) DEVICE — GLOVE, LITE (PAIR)

## (undated) DEVICE — PROTECTOR ULNA NERVE - (36PR/CA)

## (undated) DEVICE — MASK, LARYNGEAL AIRWAY #4

## (undated) DEVICE — BANDAGE ROLL STERILE BULKEE 4.5 IN X 4 YD (100EA/CA)

## (undated) DEVICE — BAG, SPONGE COUNT 50600

## (undated) DEVICE — STOCKINET TUBULAR 6IN STERILE - 6 X 48YDS (25/CA)

## (undated) DEVICE — ELECTRODE 850 FOAM ADHESIVE - HYDROGEL RADIOTRNSPRNT (50/PK)

## (undated) DEVICE — HEAD HOLDER JUNIOR/ADULT

## (undated) DEVICE — GLOVE BIOGEL INDICATOR SZ 7SURGICAL PF LTX - (50/BX 4BX/CA)

## (undated) DEVICE — SODIUM CHL IRRIGATION 0.9% 1000ML (12EA/CA)

## (undated) DEVICE — SENSOR SPO2 NEO LNCS ADHESIVE (20/BX) SEE USER NOTES

## (undated) DEVICE — GLOVE BIOGEL PI ULTRATOUCH SZ 7.5 SURGICAL PF LF -(50/BX 4BX/CA)

## (undated) DEVICE — GLOVE BIOGEL PI ORTHO SZ 7.5 PF LF (40PR/BX)

## (undated) DEVICE — CHLORAPREP 26 ML APPLICATOR - ORANGE TINT(25/CA)

## (undated) DEVICE — SYS BN CMNT HI VAC KT MXR BWL - (MIX-E-VAC II)  (10EA/CA)

## (undated) DEVICE — ELECTRODE DUAL RETURN W/ CORD - (50/PK)

## (undated) DEVICE — GLOVE BIOGEL SZ 7 SURGICAL PF LTX - (50PR/BX 4BX/CA)

## (undated) DEVICE — SUTURE GENERAL

## (undated) DEVICE — BLADE SAGITTAL 34MM

## (undated) DEVICE — SUTURE 1 VICRYL PLUS CTX - 8 X 18 INCH (12/BX)

## (undated) DEVICE — BOVIE BLADE COATED &INSULATED - 25/PK

## (undated) DEVICE — DRAPE SRG LG BCK TBL DISP - 10/CA

## (undated) DEVICE — SUTURE 2-0 MONOCRYL PLUS UNDYED CT-1 1 X 36 (36EA/BX)"

## (undated) DEVICE — NEEDLE SPINAL NON-SAFETY 20 GA X 3 IN (25/BX)

## (undated) DEVICE — DRESSING AQUACEL AG ADVANTAGE 3.5 X 10" (10EA/BX)"

## (undated) DEVICE — KIT ROOM DECONTAMINATION

## (undated) DEVICE — SUTURE 3-0 MONOCRYL PLUS PS-1 - 27 INCH (36/BX)

## (undated) DEVICE — LACTATED RINGERS INJ 1000 ML - (14EA/CA 60CA/PF)

## (undated) DEVICE — HANDPIECE 10FT INTPLS SCT PLS IRRIGATION HAND CONTROL SET (6/PK)

## (undated) DEVICE — MASK ANESTHESIA ADULT  - (100/CA)

## (undated) DEVICE — SODIUM CHL. IRRIGATION 0.9% 3000ML (4EA/CA 65CA/PF)

## (undated) DEVICE — STOCKINETTE IMPERVIOUS 6 SM (30EA/CA)

## (undated) DEVICE — TOWELS CLOTH SURGICAL - (4/PK 20PK/CA)

## (undated) DEVICE — TOURNIQUET CUFF 34 X 4 ONE PORT DISP - STERILE (10/BX)

## (undated) DEVICE — SUTURE 2-0 VICRYL PLUS CT-1 - 8 X 18 INCH(12/BX)

## (undated) DEVICE — CONTAINER SPECIMEN BAG OR - STERILE 4 OZ W/LID (100EA/CA)

## (undated) DEVICE — SYRINGE 10 ML CONTROL LL (25EA/BX 4BX/CA)

## (undated) DEVICE — GOWN WARMING STANDARD FLEX - (30/CA)

## (undated) DEVICE — GLOVE BIOGEL SZ 8 SURGICAL PF LTX - (50PR/BX 4BX/CA)

## (undated) DEVICE — NEPTUNE 4 PORT MANIFOLD - (20/PK)

## (undated) DEVICE — PAD UNIVERSAL MULTI USE (1/EA)

## (undated) DEVICE — BANDAID SHEER STRIP 3/4 IN (100EA/BX 12BX/CA)

## (undated) DEVICE — BLADE SAGITTAL SAW DUAL CUT 25.0 X 90.0 X 1.27MM (1/EA)

## (undated) DEVICE — Device

## (undated) DEVICE — SUCTION INSTRUMENT YANKAUER BULBOUS TIP W/O VENT (50EA/CA)

## (undated) DEVICE — HUMID-VENT HEAT AND MOISTURE EXCHANGE- (50/BX)

## (undated) DEVICE — CANISTER SUCTION RIGID RED 1500CC (40EA/CA)

## (undated) DEVICE — GLOVE BIOGEL SZ 6.5 SURGICAL PF LTX (50PR/BX 4BX/CA)

## (undated) DEVICE — PAD FOR KNEE POSITIONER - (10/CA)

## (undated) DEVICE — KIT ANESTHESIA W/CIRCUIT & 3/LT BAG W/FILTER (20EA/CA)

## (undated) DEVICE — NEEDLE W/FACET TIP DULL VERSION W/STIMULATION CABLE SONOPLEX 21G X 4 (10/EA)"

## (undated) DEVICE — SPONGE GAUZESTER 4 X 4 4PLY - (128PK/CA)

## (undated) DEVICE — PACK TOTAL KNEE  (1/CA)

## (undated) DEVICE — BLOCK

## (undated) DEVICE — LENS/HOOD FOR SPACESUIT - (32/PK) PEEL AWAY FACE

## (undated) DEVICE — TIP INTPLS HFLO ML ORFC BTRY - (12/CS)  FOR SURGILAV

## (undated) DEVICE — NEEDLE SPINAL NON-SAFETY 18 GA X 3 IN (25EA/BX)

## (undated) DEVICE — GLOVE BIOGEL INDICATOR SZ 6.5 SURGICAL PF LTX - (50PR/BX 4BX/CA)

## (undated) DEVICE — GLOVE BIOGEL INDICATOR SZ 8 SURGICAL PF LTX - (50/BX 4BX/CA)